# Patient Record
Sex: FEMALE | Race: WHITE | Employment: OTHER
[De-identification: names, ages, dates, MRNs, and addresses within clinical notes are randomized per-mention and may not be internally consistent; named-entity substitution may affect disease eponyms.]

---

## 2017-01-05 ENCOUNTER — TELEPHONE (OUTPATIENT)
Dept: CASE MANAGEMENT | Age: 62
End: 2017-01-05

## 2017-02-03 PROBLEM — E87.5 HYPERKALEMIA: Status: ACTIVE | Noted: 2017-02-03

## 2017-04-05 PROBLEM — N18.9 CKD (CHRONIC KIDNEY DISEASE): Status: RESOLVED | Noted: 2017-04-05 | Resolved: 2017-04-05

## 2017-04-05 PROBLEM — N18.9 CKD (CHRONIC KIDNEY DISEASE): Status: ACTIVE | Noted: 2017-04-05

## 2017-04-05 PROBLEM — E87.5 HYPERKALEMIA: Status: RESOLVED | Noted: 2017-02-03 | Resolved: 2017-04-05

## 2017-04-06 PROBLEM — N18.5 CKD (CHRONIC KIDNEY DISEASE), STAGE V (HCC): Status: ACTIVE | Noted: 2017-04-05

## 2017-04-06 PROBLEM — E87.70 VOLUME OVERLOAD: Status: ACTIVE | Noted: 2017-04-06

## 2017-04-06 PROBLEM — I10 ESSENTIAL HYPERTENSION: Status: ACTIVE | Noted: 2017-04-06

## 2017-04-07 PROBLEM — D84.9 IMMUNOSUPPRESSED STATUS (HCC): Status: ACTIVE | Noted: 2017-04-07

## 2017-04-07 PROBLEM — J96.21 ACUTE ON CHRONIC RESPIRATORY FAILURE WITH HYPOXIA (HCC): Status: ACTIVE | Noted: 2017-04-07

## 2017-05-17 PROBLEM — G40.309 GENERALIZED SEIZURE DISORDER (HCC): Status: ACTIVE | Noted: 2017-05-17

## 2017-11-13 PROBLEM — N18.5 CKD (CHRONIC KIDNEY DISEASE), STAGE V (HCC): Status: RESOLVED | Noted: 2017-04-05 | Resolved: 2017-11-13

## 2018-04-20 ENCOUNTER — APPOINTMENT (OUTPATIENT)
Dept: GENERAL RADIOLOGY | Age: 63
DRG: 193 | End: 2018-04-20
Payer: MEDICARE

## 2018-04-20 ENCOUNTER — APPOINTMENT (OUTPATIENT)
Dept: CT IMAGING | Age: 63
DRG: 193 | End: 2018-04-20
Payer: MEDICARE

## 2018-04-20 ENCOUNTER — HOSPITAL ENCOUNTER (INPATIENT)
Age: 63
LOS: 2 days | Discharge: HOME | DRG: 193 | End: 2018-04-22
Attending: EMERGENCY MEDICINE | Admitting: INTERNAL MEDICINE
Payer: MEDICARE

## 2018-04-20 DIAGNOSIS — R05.9 COUGH: ICD-10-CM

## 2018-04-20 DIAGNOSIS — K52.9 GASTROENTERITIS: ICD-10-CM

## 2018-04-20 DIAGNOSIS — J18.9 PNEUMONIA DUE TO ORGANISM: Primary | ICD-10-CM
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
## 2018-04-20 LAB
ANION GAP SERPL CALCULATED.3IONS-SCNC: 16 MMOL/L (ref 7–16)
ANISOCYTOSIS: ABNORMAL
BASOPHILS ABSOLUTE: 0 E9/L (ref 0–0.2)
BASOPHILS RELATIVE PERCENT: 0 % (ref 0–2)
BUN BLDV-MCNC: 43 MG/DL (ref 8–23)
CALCIUM SERPL-MCNC: 9.4 MG/DL (ref 8.6–10.2)
CHLORIDE BLD-SCNC: 92 MMOL/L (ref 98–107)
CO2: 29 MMOL/L (ref 22–29)
CREAT SERPL-MCNC: 4.9 MG/DL (ref 0.5–1)
EOSINOPHILS ABSOLUTE: 0 E9/L (ref 0.05–0.5)
EOSINOPHILS RELATIVE PERCENT: 0 % (ref 0–6)
GFR AFRICAN AMERICAN: 11
GFR NON-AFRICAN AMERICAN: 9 ML/MIN/1.73
GLUCOSE BLD-MCNC: 121 MG/DL (ref 74–109)
HCT VFR BLD CALC: 31.1 % (ref 34–48)
HEMOGLOBIN: 10.4 G/DL (ref 11.5–15.5)
INFLUENZA A BY PCR: NOT DETECTED
INFLUENZA B BY PCR: NOT DETECTED
LACTIC ACID: 1.1 MMOL/L (ref 0.5–2.2)
LYMPHOCYTES ABSOLUTE: 1.38 E9/L (ref 1.5–4)
LYMPHOCYTES RELATIVE PERCENT: 15.6 % (ref 20–42)
MCH RBC QN AUTO: 37.1 PG (ref 26–35)
MCHC RBC AUTO-ENTMCNC: 33.4 % (ref 32–34.5)
MCV RBC AUTO: 111.1 FL (ref 80–99.9)
MONOCYTES ABSOLUTE: 0.95 E9/L (ref 0.1–0.95)
MONOCYTES RELATIVE PERCENT: 11.3 % (ref 2–12)
NEUTROPHILS ABSOLUTE: 6.19 E9/L (ref 1.8–7.3)
NEUTROPHILS RELATIVE PERCENT: 72.2 % (ref 43–80)
NUCLEATED RED BLOOD CELLS: 0 /100 WBC
OVALOCYTES: ABNORMAL
PDW BLD-RTO: 14 FL (ref 11.5–15)
PLATELET # BLD: 180 E9/L (ref 130–450)
PMV BLD AUTO: 10.6 FL (ref 7–12)
POIKILOCYTES: ABNORMAL
POTASSIUM REFLEX MAGNESIUM: 5 MMOL/L (ref 3.5–5)
PROMYELOCYTES PERCENT: 0.9 % (ref 0–0)
RBC # BLD: 2.8 E12/L (ref 3.5–5.5)
SODIUM BLD-SCNC: 137 MMOL/L (ref 132–146)
WBC # BLD: 8.6 E9/L (ref 4.5–11.5)

## 2018-04-20 PROCEDURE — 6370000000 HC RX 637 (ALT 250 FOR IP): Performed by: INTERNAL MEDICINE

## 2018-04-20 PROCEDURE — 2580000003 HC RX 258: Performed by: FAMILY MEDICINE

## 2018-04-20 PROCEDURE — 83605 ASSAY OF LACTIC ACID: CPT

## 2018-04-20 PROCEDURE — 71046 X-RAY EXAM CHEST 2 VIEWS: CPT

## 2018-04-20 PROCEDURE — 87503 INFLUENZA DNA AMP PROB ADDL: CPT

## 2018-04-20 PROCEDURE — 87798 DETECT AGENT NOS DNA AMP: CPT

## 2018-04-20 PROCEDURE — 87501 INFLUENZA DNA AMP PROB 1+: CPT

## 2018-04-20 PROCEDURE — 87486 CHLMYD PNEUM DNA AMP PROBE: CPT

## 2018-04-20 PROCEDURE — 87581 M.PNEUMON DNA AMP PROBE: CPT

## 2018-04-20 PROCEDURE — 6360000002 HC RX W HCPCS: Performed by: FAMILY MEDICINE

## 2018-04-20 PROCEDURE — 80048 BASIC METABOLIC PNL TOTAL CA: CPT

## 2018-04-20 PROCEDURE — 71250 CT THORAX DX C-: CPT

## 2018-04-20 PROCEDURE — 87040 BLOOD CULTURE FOR BACTERIA: CPT

## 2018-04-20 PROCEDURE — 2580000003 HC RX 258: Performed by: INTERNAL MEDICINE

## 2018-04-20 PROCEDURE — 1200000000 HC SEMI PRIVATE

## 2018-04-20 PROCEDURE — 99285 EMERGENCY DEPT VISIT HI MDM: CPT

## 2018-04-20 PROCEDURE — 85025 COMPLETE CBC W/AUTO DIFF WBC: CPT

## 2018-04-20 PROCEDURE — 6360000002 HC RX W HCPCS: Performed by: INTERNAL MEDICINE

## 2018-04-20 PROCEDURE — 87502 INFLUENZA DNA AMP PROBE: CPT

## 2018-04-20 RX ORDER — SODIUM CHLORIDE 0.9 % (FLUSH) 0.9 %
10 SYRINGE (ML) INJECTION PRN
Status: DISCONTINUED | OUTPATIENT
Start: 2018-04-20 | End: 2018-04-20 | Stop reason: SDUPTHER

## 2018-04-20 RX ORDER — PREDNISONE 1 MG/1
5 TABLET ORAL EVERY MORNING
Status: DISCONTINUED | OUTPATIENT
Start: 2018-04-21 | End: 2018-04-22 | Stop reason: HOSPADM

## 2018-04-20 RX ORDER — PRIMIDONE 50 MG/1
150 TABLET ORAL 2 TIMES DAILY
Status: DISCONTINUED | OUTPATIENT
Start: 2018-04-20 | End: 2018-04-22 | Stop reason: HOSPADM

## 2018-04-20 RX ORDER — LEVETIRACETAM 100 MG/ML
1500 SOLUTION ORAL 2 TIMES DAILY
Status: DISCONTINUED | OUTPATIENT
Start: 2018-04-20 | End: 2018-04-22 | Stop reason: HOSPADM

## 2018-04-20 RX ORDER — SEVELAMER CARBONATE 800 MG/1
800 TABLET, FILM COATED ORAL
Status: DISCONTINUED | OUTPATIENT
Start: 2018-04-20 | End: 2018-04-22 | Stop reason: HOSPADM

## 2018-04-20 RX ORDER — HEPARIN SODIUM 10000 [USP'U]/ML
5000 INJECTION, SOLUTION INTRAVENOUS; SUBCUTANEOUS EVERY 8 HOURS
Status: DISCONTINUED | OUTPATIENT
Start: 2018-04-20 | End: 2018-04-22 | Stop reason: HOSPADM

## 2018-04-20 RX ORDER — VIT B COMP NO.3/FOLIC/C/BIOTIN 1 MG-60 MG
1 TABLET ORAL EVERY MORNING
Status: ON HOLD | COMMUNITY
End: 2018-04-21 | Stop reason: ALTCHOICE

## 2018-04-20 RX ORDER — CHOLECALCIFEROL (VITAMIN D3) 10 MCG
1 TABLET ORAL EVERY MORNING
Status: DISCONTINUED | OUTPATIENT
Start: 2018-04-21 | End: 2018-04-22 | Stop reason: HOSPADM

## 2018-04-20 RX ORDER — AMLODIPINE BESYLATE 10 MG/1
10 TABLET ORAL EVERY MORNING
Status: DISCONTINUED | OUTPATIENT
Start: 2018-04-21 | End: 2018-04-22 | Stop reason: HOSPADM

## 2018-04-20 RX ORDER — SODIUM BICARBONATE 650 MG/1
650 TABLET ORAL
Status: DISCONTINUED | OUTPATIENT
Start: 2018-04-20 | End: 2018-04-22 | Stop reason: HOSPADM

## 2018-04-20 RX ORDER — SODIUM CHLORIDE 0.9 % (FLUSH) 0.9 %
10 SYRINGE (ML) INJECTION PRN
Status: DISCONTINUED | OUTPATIENT
Start: 2018-04-20 | End: 2018-04-22 | Stop reason: HOSPADM

## 2018-04-20 RX ORDER — ACETAMINOPHEN 325 MG/1
650 TABLET ORAL EVERY 4 HOURS PRN
Status: DISCONTINUED | OUTPATIENT
Start: 2018-04-20 | End: 2018-04-20 | Stop reason: SDUPTHER

## 2018-04-20 RX ORDER — TACROLIMUS 1 MG/1
3 CAPSULE ORAL NIGHTLY
COMMUNITY
End: 2018-07-19 | Stop reason: ALTCHOICE

## 2018-04-20 RX ORDER — ONDANSETRON 2 MG/ML
4 INJECTION INTRAMUSCULAR; INTRAVENOUS EVERY 6 HOURS PRN
Status: DISCONTINUED | OUTPATIENT
Start: 2018-04-20 | End: 2018-04-22 | Stop reason: HOSPADM

## 2018-04-20 RX ORDER — ACETAMINOPHEN 325 MG/1
650 TABLET ORAL EVERY 4 HOURS PRN
Status: DISCONTINUED | OUTPATIENT
Start: 2018-04-20 | End: 2018-04-22 | Stop reason: HOSPADM

## 2018-04-20 RX ORDER — LACOSAMIDE 100 MG/1
250 TABLET ORAL NIGHTLY
Status: DISCONTINUED | OUTPATIENT
Start: 2018-04-20 | End: 2018-04-22 | Stop reason: HOSPADM

## 2018-04-20 RX ORDER — TACROLIMUS 1 MG/1
4 CAPSULE ORAL 2 TIMES DAILY
Status: DISCONTINUED | OUTPATIENT
Start: 2018-04-20 | End: 2018-04-21

## 2018-04-20 RX ORDER — SODIUM CHLORIDE 9 MG/ML
INJECTION, SOLUTION INTRAVENOUS CONTINUOUS
Status: DISCONTINUED | OUTPATIENT
Start: 2018-04-20 | End: 2018-04-21

## 2018-04-20 RX ORDER — LEVOFLOXACIN 5 MG/ML
750 INJECTION, SOLUTION INTRAVENOUS ONCE
Status: COMPLETED | OUTPATIENT
Start: 2018-04-20 | End: 2018-04-20

## 2018-04-20 RX ORDER — SODIUM CHLORIDE 0.9 % (FLUSH) 0.9 %
10 SYRINGE (ML) INJECTION EVERY 12 HOURS SCHEDULED
Status: DISCONTINUED | OUTPATIENT
Start: 2018-04-20 | End: 2018-04-20 | Stop reason: SDUPTHER

## 2018-04-20 RX ORDER — SODIUM CHLORIDE 0.9 % (FLUSH) 0.9 %
10 SYRINGE (ML) INJECTION EVERY 12 HOURS SCHEDULED
Status: DISCONTINUED | OUTPATIENT
Start: 2018-04-20 | End: 2018-04-22 | Stop reason: HOSPADM

## 2018-04-20 RX ORDER — CALCITRIOL 0.25 UG/1
0.5 CAPSULE, LIQUID FILLED ORAL EVERY MORNING
Status: DISCONTINUED | OUTPATIENT
Start: 2018-04-21 | End: 2018-04-22 | Stop reason: HOSPADM

## 2018-04-20 RX ADMIN — SODIUM CHLORIDE: 9 INJECTION, SOLUTION INTRAVENOUS at 20:56

## 2018-04-20 RX ADMIN — LACOSAMIDE 250 MG: 100 TABLET, FILM COATED ORAL at 20:54

## 2018-04-20 RX ADMIN — TACROLIMUS 4 MG: 1 CAPSULE ORAL at 20:54

## 2018-04-20 RX ADMIN — LEVETIRACETAM 1500 MG: 500 SOLUTION ORAL at 20:54

## 2018-04-20 RX ADMIN — SODIUM BICARBONATE 650 MG: 650 TABLET ORAL at 20:54

## 2018-04-20 RX ADMIN — Medication 10 ML: at 20:55

## 2018-04-20 RX ADMIN — PRIMIDONE 150 MG: 50 TABLET ORAL at 20:54

## 2018-04-20 RX ADMIN — CEFEPIME 1 G: 1 INJECTION, POWDER, FOR SOLUTION INTRAMUSCULAR; INTRAVENOUS at 17:28

## 2018-04-20 RX ADMIN — LEVOFLOXACIN 750 MG: 5 INJECTION, SOLUTION INTRAVENOUS at 21:09

## 2018-04-20 ASSESSMENT — ENCOUNTER SYMPTOMS
COUGH: 1
WHEEZING: 0
SHORTNESS OF BREATH: 0
NAUSEA: 1
SORE THROAT: 0

## 2018-04-20 ASSESSMENT — PAIN SCALES - GENERAL
PAINLEVEL_OUTOF10: 0

## 2018-04-21 PROBLEM — D64.9 ANEMIA: Status: ACTIVE | Noted: 2018-04-21

## 2018-04-21 LAB
ALBUMIN SERPL-MCNC: 3.4 G/DL (ref 3.5–5.2)
ALP BLD-CCNC: 126 U/L (ref 35–104)
ALT SERPL-CCNC: 8 U/L (ref 0–32)
ANION GAP SERPL CALCULATED.3IONS-SCNC: 18 MMOL/L (ref 7–16)
ANISOCYTOSIS: ABNORMAL
AST SERPL-CCNC: 15 U/L (ref 0–31)
BASOPHILS ABSOLUTE: 0.02 E9/L (ref 0–0.2)
BASOPHILS RELATIVE PERCENT: 0.3 % (ref 0–2)
BILIRUB SERPL-MCNC: 0.4 MG/DL (ref 0–1.2)
BUN BLDV-MCNC: 48 MG/DL (ref 8–23)
CALCIUM SERPL-MCNC: 8.7 MG/DL (ref 8.6–10.2)
CHLORIDE BLD-SCNC: 89 MMOL/L (ref 98–107)
CO2: 26 MMOL/L (ref 22–29)
CREAT SERPL-MCNC: 5.4 MG/DL (ref 0.5–1)
EOSINOPHILS ABSOLUTE: 0.14 E9/L (ref 0.05–0.5)
EOSINOPHILS RELATIVE PERCENT: 2.2 % (ref 0–6)
FILM ARRAY ADENOVIRUS: ABNORMAL
FILM ARRAY BORDETELLA PERTUSSIS: ABNORMAL
FILM ARRAY CHLAMYDOPHILIA PNEUMONIAE: ABNORMAL
FILM ARRAY CORONAVIRUS 229E: ABNORMAL
FILM ARRAY CORONAVIRUS HKU1: ABNORMAL
FILM ARRAY CORONAVIRUS NL63: ABNORMAL
FILM ARRAY CORONAVIRUS OC43: ABNORMAL
FILM ARRAY INFLUENZA A VIRUS 09H1: ABNORMAL
FILM ARRAY INFLUENZA A VIRUS H1: ABNORMAL
FILM ARRAY INFLUENZA A VIRUS H3: ABNORMAL
FILM ARRAY INFLUENZA A VIRUS: ABNORMAL
FILM ARRAY INFLUENZA B: ABNORMAL
FILM ARRAY MYCOPLASMA PNEUMONIAE: ABNORMAL
FILM ARRAY PARAINFLUENZA VIRUS 1: ABNORMAL
FILM ARRAY PARAINFLUENZA VIRUS 2: ABNORMAL
FILM ARRAY PARAINFLUENZA VIRUS 3: ABNORMAL
FILM ARRAY PARAINFLUENZA VIRUS 4: ABNORMAL
FILM ARRAY RESPIRATORY SYNCITIAL VIRUS: ABNORMAL
FILM ARRAY RHINOVIRUS/ENTEROVIRUS: ABNORMAL
GFR AFRICAN AMERICAN: 10
GFR NON-AFRICAN AMERICAN: 8 ML/MIN/1.73
GLUCOSE BLD-MCNC: 87 MG/DL (ref 74–109)
HCT VFR BLD CALC: 28.2 % (ref 34–48)
HEMOGLOBIN: 9.1 G/DL (ref 11.5–15.5)
IMMATURE GRANULOCYTES #: 0.03 E9/L
IMMATURE GRANULOCYTES %: 0.5 % (ref 0–5)
L. PNEUMOPHILA SEROGP 1 UR AG: NORMAL
LYMPHOCYTES ABSOLUTE: 2.49 E9/L (ref 1.5–4)
LYMPHOCYTES RELATIVE PERCENT: 38.7 % (ref 20–42)
MAGNESIUM: 2.2 MG/DL (ref 1.6–2.6)
MCH RBC QN AUTO: 35.7 PG (ref 26–35)
MCHC RBC AUTO-ENTMCNC: 32.3 % (ref 32–34.5)
MCV RBC AUTO: 110.6 FL (ref 80–99.9)
MONOCYTES ABSOLUTE: 0.64 E9/L (ref 0.1–0.95)
MONOCYTES RELATIVE PERCENT: 9.9 % (ref 2–12)
NEUTROPHILS ABSOLUTE: 3.12 E9/L (ref 1.8–7.3)
NEUTROPHILS RELATIVE PERCENT: 48.4 % (ref 43–80)
ORGANISM: ABNORMAL
PDW BLD-RTO: 14.1 FL (ref 11.5–15)
PHOSPHORUS: 4.1 MG/DL (ref 2.5–4.5)
PLATELET # BLD: 171 E9/L (ref 130–450)
PMV BLD AUTO: 10.8 FL (ref 7–12)
POTASSIUM REFLEX MAGNESIUM: 4.3 MMOL/L (ref 3.5–5)
RBC # BLD: 2.55 E12/L (ref 3.5–5.5)
SODIUM BLD-SCNC: 133 MMOL/L (ref 132–146)
STREP PNEUMONIAE ANTIGEN, URINE: NORMAL
TOTAL PROTEIN: 6.9 G/DL (ref 6.4–8.3)
WBC # BLD: 6.4 E9/L (ref 4.5–11.5)

## 2018-04-21 PROCEDURE — 6360000002 HC RX W HCPCS: Performed by: FAMILY MEDICINE

## 2018-04-21 PROCEDURE — 1200000000 HC SEMI PRIVATE

## 2018-04-21 PROCEDURE — 87450 HC DIRECT STREP B ANTIGEN: CPT

## 2018-04-21 PROCEDURE — 84100 ASSAY OF PHOSPHORUS: CPT

## 2018-04-21 PROCEDURE — 5A1D70Z PERFORMANCE OF URINARY FILTRATION, INTERMITTENT, LESS THAN 6 HOURS PER DAY: ICD-10-PCS | Performed by: INTERNAL MEDICINE

## 2018-04-21 PROCEDURE — 6370000000 HC RX 637 (ALT 250 FOR IP): Performed by: INTERNAL MEDICINE

## 2018-04-21 PROCEDURE — 80053 COMPREHEN METABOLIC PANEL: CPT

## 2018-04-21 PROCEDURE — 2580000003 HC RX 258: Performed by: INTERNAL MEDICINE

## 2018-04-21 PROCEDURE — 90935 HEMODIALYSIS ONE EVALUATION: CPT

## 2018-04-21 PROCEDURE — 2580000003 HC RX 258: Performed by: FAMILY MEDICINE

## 2018-04-21 PROCEDURE — 6360000002 HC RX W HCPCS: Performed by: INTERNAL MEDICINE

## 2018-04-21 PROCEDURE — 85025 COMPLETE CBC W/AUTO DIFF WBC: CPT

## 2018-04-21 PROCEDURE — 83735 ASSAY OF MAGNESIUM: CPT

## 2018-04-21 PROCEDURE — 36415 COLL VENOUS BLD VENIPUNCTURE: CPT

## 2018-04-21 RX ORDER — MELATONIN 5 MG
5 TABLET,CHEWABLE ORAL NIGHTLY PRN
Status: DISCONTINUED | OUTPATIENT
Start: 2018-04-21 | End: 2018-04-22 | Stop reason: HOSPADM

## 2018-04-21 RX ORDER — SULFAMETHOXAZOLE AND TRIMETHOPRIM 800; 160 MG/1; MG/1
1 TABLET ORAL
COMMUNITY
End: 2019-10-02

## 2018-04-21 RX ORDER — LACOSAMIDE 100 MG/1
200 TABLET ORAL DAILY
Status: DISCONTINUED | OUTPATIENT
Start: 2018-04-22 | End: 2018-04-22 | Stop reason: HOSPADM

## 2018-04-21 RX ORDER — CARVEDILOL 6.25 MG/1
6.25 TABLET ORAL 2 TIMES DAILY WITH MEALS
Status: DISCONTINUED | OUTPATIENT
Start: 2018-04-21 | End: 2018-04-22 | Stop reason: HOSPADM

## 2018-04-21 RX ORDER — LIDOCAINE AND PRILOCAINE 25; 25 MG/G; MG/G
CREAM TOPICAL PRN
Status: DISCONTINUED | OUTPATIENT
Start: 2018-04-21 | End: 2018-04-22 | Stop reason: HOSPADM

## 2018-04-21 RX ORDER — TACROLIMUS 1 MG/1
3 CAPSULE ORAL NIGHTLY
Status: DISCONTINUED | OUTPATIENT
Start: 2018-04-21 | End: 2018-04-22 | Stop reason: HOSPADM

## 2018-04-21 RX ORDER — LANOLIN ALCOHOL/MO/W.PET/CERES
5 CREAM (GRAM) TOPICAL NIGHTLY PRN
COMMUNITY
End: 2019-10-02

## 2018-04-21 RX ORDER — TACROLIMUS 1 MG/1
4 CAPSULE ORAL 2 TIMES DAILY WITH MEALS
Status: DISCONTINUED | OUTPATIENT
Start: 2018-04-22 | End: 2018-04-22 | Stop reason: HOSPADM

## 2018-04-21 RX ADMIN — SEVELAMER CARBONATE 800 MG: 800 TABLET, FILM COATED ORAL at 16:52

## 2018-04-21 RX ADMIN — PREDNISONE 5 MG: 5 TABLET ORAL at 08:12

## 2018-04-21 RX ADMIN — TACROLIMUS 4 MG: 1 CAPSULE ORAL at 08:11

## 2018-04-21 RX ADMIN — NEPHROCAP 1 MG: 1 CAP ORAL at 08:12

## 2018-04-21 RX ADMIN — CARVEDILOL 6.25 MG: 6.25 TABLET, FILM COATED ORAL at 18:23

## 2018-04-21 RX ADMIN — TACROLIMUS 4 MG: 1 CAPSULE ORAL at 13:53

## 2018-04-21 RX ADMIN — LEVETIRACETAM 1500 MG: 500 SOLUTION ORAL at 19:29

## 2018-04-21 RX ADMIN — ACETAMINOPHEN 650 MG: 325 TABLET ORAL at 04:04

## 2018-04-21 RX ADMIN — SODIUM BICARBONATE 650 MG: 650 TABLET ORAL at 12:46

## 2018-04-21 RX ADMIN — HEPARIN SODIUM 5000 UNITS: 10000 INJECTION, SOLUTION INTRAVENOUS; SUBCUTANEOUS at 18:24

## 2018-04-21 RX ADMIN — SODIUM BICARBONATE 650 MG: 650 TABLET ORAL at 07:09

## 2018-04-21 RX ADMIN — TACROLIMUS 3 MG: 1 CAPSULE ORAL at 20:14

## 2018-04-21 RX ADMIN — HEPARIN SODIUM 5000 UNITS: 10000 INJECTION, SOLUTION INTRAVENOUS; SUBCUTANEOUS at 03:17

## 2018-04-21 RX ADMIN — VANCOMYCIN HYDROCHLORIDE 1000 MG: 1 INJECTION, POWDER, LYOPHILIZED, FOR SOLUTION INTRAVENOUS at 01:00

## 2018-04-21 RX ADMIN — SODIUM CHLORIDE: 9 INJECTION, SOLUTION INTRAVENOUS at 13:55

## 2018-04-21 RX ADMIN — LACOSAMIDE 250 MG: 100 TABLET, FILM COATED ORAL at 20:15

## 2018-04-21 RX ADMIN — LIDOCAINE AND PRILOCAINE: 25; 25 CREAM TOPICAL at 08:07

## 2018-04-21 RX ADMIN — PRIMIDONE 150 MG: 50 TABLET ORAL at 20:14

## 2018-04-21 RX ADMIN — PRIMIDONE 150 MG: 50 TABLET ORAL at 08:12

## 2018-04-21 RX ADMIN — AMLODIPINE BESYLATE 10 MG: 10 TABLET ORAL at 08:11

## 2018-04-21 RX ADMIN — LEVETIRACETAM 1500 MG: 500 SOLUTION ORAL at 08:11

## 2018-04-21 RX ADMIN — Medication 10 ML: at 20:51

## 2018-04-21 RX ADMIN — SODIUM BICARBONATE 650 MG: 650 TABLET ORAL at 16:52

## 2018-04-21 ASSESSMENT — PAIN SCALES - GENERAL
PAINLEVEL_OUTOF10: 0
PAINLEVEL_OUTOF10: 5
PAINLEVEL_OUTOF10: 0

## 2018-04-22 VITALS
HEIGHT: 63 IN | WEIGHT: 112.21 LBS | DIASTOLIC BLOOD PRESSURE: 79 MMHG | OXYGEN SATURATION: 95 % | RESPIRATION RATE: 16 BRPM | BODY MASS INDEX: 19.88 KG/M2 | HEART RATE: 76 BPM | TEMPERATURE: 98.2 F | SYSTOLIC BLOOD PRESSURE: 130 MMHG

## 2018-04-22 LAB
ANION GAP SERPL CALCULATED.3IONS-SCNC: 14 MMOL/L (ref 7–16)
BUN BLDV-MCNC: 38 MG/DL (ref 8–23)
CALCIUM SERPL-MCNC: 9 MG/DL (ref 8.6–10.2)
CHLORIDE BLD-SCNC: 95 MMOL/L (ref 98–107)
CO2: 28 MMOL/L (ref 22–29)
CREAT SERPL-MCNC: 4.7 MG/DL (ref 0.5–1)
GFR AFRICAN AMERICAN: 11
GFR NON-AFRICAN AMERICAN: 9 ML/MIN/1.73
GLUCOSE BLD-MCNC: 82 MG/DL (ref 74–109)
HCT VFR BLD CALC: 27.2 % (ref 34–48)
HEMOGLOBIN: 8.9 G/DL (ref 11.5–15.5)
MAGNESIUM: 2.1 MG/DL (ref 1.6–2.6)
MCH RBC QN AUTO: 35.9 PG (ref 26–35)
MCHC RBC AUTO-ENTMCNC: 32.7 % (ref 32–34.5)
MCV RBC AUTO: 109.7 FL (ref 80–99.9)
PDW BLD-RTO: 13.7 FL (ref 11.5–15)
PHOSPHORUS: 4.2 MG/DL (ref 2.5–4.5)
PLATELET # BLD: 175 E9/L (ref 130–450)
PMV BLD AUTO: 10.6 FL (ref 7–12)
POTASSIUM SERPL-SCNC: 4.9 MMOL/L (ref 3.5–5)
RBC # BLD: 2.48 E12/L (ref 3.5–5.5)
SODIUM BLD-SCNC: 137 MMOL/L (ref 132–146)
WBC # BLD: 5.5 E9/L (ref 4.5–11.5)

## 2018-04-22 PROCEDURE — 85027 COMPLETE CBC AUTOMATED: CPT

## 2018-04-22 PROCEDURE — 84100 ASSAY OF PHOSPHORUS: CPT

## 2018-04-22 PROCEDURE — 6360000002 HC RX W HCPCS: Performed by: INTERNAL MEDICINE

## 2018-04-22 PROCEDURE — 36415 COLL VENOUS BLD VENIPUNCTURE: CPT

## 2018-04-22 PROCEDURE — 80048 BASIC METABOLIC PNL TOTAL CA: CPT

## 2018-04-22 PROCEDURE — 6370000000 HC RX 637 (ALT 250 FOR IP): Performed by: INTERNAL MEDICINE

## 2018-04-22 PROCEDURE — 2580000003 HC RX 258: Performed by: INTERNAL MEDICINE

## 2018-04-22 PROCEDURE — 83735 ASSAY OF MAGNESIUM: CPT

## 2018-04-22 RX ADMIN — PREDNISONE 5 MG: 5 TABLET ORAL at 08:56

## 2018-04-22 RX ADMIN — CARVEDILOL 6.25 MG: 6.25 TABLET, FILM COATED ORAL at 07:50

## 2018-04-22 RX ADMIN — TACROLIMUS 4 MG: 1 CAPSULE ORAL at 07:50

## 2018-04-22 RX ADMIN — NEPHROCAP 1 MG: 1 CAP ORAL at 08:56

## 2018-04-22 RX ADMIN — SODIUM BICARBONATE 650 MG: 650 TABLET ORAL at 11:15

## 2018-04-22 RX ADMIN — SODIUM BICARBONATE 650 MG: 650 TABLET ORAL at 06:26

## 2018-04-22 RX ADMIN — CALCITRIOL 0.5 MCG: 0.25 CAPSULE, LIQUID FILLED ORAL at 08:56

## 2018-04-22 RX ADMIN — LACOSAMIDE 200 MG: 100 TABLET, FILM COATED ORAL at 08:56

## 2018-04-22 RX ADMIN — SEVELAMER CARBONATE 800 MG: 800 TABLET, FILM COATED ORAL at 07:51

## 2018-04-22 RX ADMIN — AMLODIPINE BESYLATE 10 MG: 10 TABLET ORAL at 08:56

## 2018-04-22 RX ADMIN — HEPARIN SODIUM 5000 UNITS: 10000 INJECTION, SOLUTION INTRAVENOUS; SUBCUTANEOUS at 06:26

## 2018-04-22 RX ADMIN — SEVELAMER CARBONATE 800 MG: 800 TABLET, FILM COATED ORAL at 12:06

## 2018-04-22 RX ADMIN — LEVETIRACETAM 1500 MG: 500 SOLUTION ORAL at 08:56

## 2018-04-22 RX ADMIN — Medication 10 ML: at 08:57

## 2018-04-22 RX ADMIN — PRIMIDONE 150 MG: 50 TABLET ORAL at 08:57

## 2018-04-22 ASSESSMENT — PAIN SCALES - GENERAL: PAINLEVEL_OUTOF10: 0

## 2018-04-25 LAB
BLOOD CULTURE, ROUTINE: NORMAL
CULTURE, BLOOD 2: NORMAL

## 2018-05-02 LAB
EKG ATRIAL RATE: 78 BPM
EKG P AXIS: 43 DEGREES
EKG P-R INTERVAL: 172 MS
EKG Q-T INTERVAL: 410 MS
EKG QRS DURATION: 82 MS
EKG QTC CALCULATION (BAZETT): 467 MS
EKG R AXIS: 29 DEGREES
EKG T AXIS: 42 DEGREES
EKG VENTRICULAR RATE: 78 BPM

## 2018-06-05 ENCOUNTER — HOSPITAL ENCOUNTER (OUTPATIENT)
Age: 63
Discharge: HOME | End: 2018-06-07
Payer: MEDICARE

## 2018-06-05 PROCEDURE — 88175 CYTOPATH C/V AUTO FLUID REDO: CPT

## 2018-06-05 PROCEDURE — 87624 HPV HI-RISK TYP POOLED RSLT: CPT

## 2018-06-13 LAB
CORRESPONDING PAP CASE #: NORMAL
HPV, HIGH RISK: NEGATIVE

## 2018-07-19 ENCOUNTER — HOSPITAL ENCOUNTER (EMERGENCY)
Age: 63
Discharge: HOME | End: 2018-07-19
Attending: EMERGENCY MEDICINE
Payer: MEDICARE

## 2018-07-19 ENCOUNTER — APPOINTMENT (OUTPATIENT)
Dept: CT IMAGING | Age: 63
End: 2018-07-19
Payer: MEDICARE

## 2018-07-19 VITALS
WEIGHT: 108 LBS | RESPIRATION RATE: 16 BRPM | DIASTOLIC BLOOD PRESSURE: 74 MMHG | OXYGEN SATURATION: 98 % | TEMPERATURE: 98.1 F | HEIGHT: 60 IN | HEART RATE: 73 BPM | BODY MASS INDEX: 21.2 KG/M2 | SYSTOLIC BLOOD PRESSURE: 117 MMHG

## 2018-07-19 DIAGNOSIS — R56.9 SEIZURE (HCC): Primary | ICD-10-CM

## 2018-07-19 LAB
ALBUMIN SERPL-MCNC: 4.3 G/DL (ref 3.5–5.2)
ALP BLD-CCNC: 174 U/L (ref 35–104)
ALT SERPL-CCNC: 19 U/L (ref 0–32)
ANION GAP SERPL CALCULATED.3IONS-SCNC: 15 MMOL/L (ref 7–16)
AST SERPL-CCNC: 26 U/L (ref 0–31)
BASOPHILS ABSOLUTE: 0 E9/L (ref 0–0.2)
BASOPHILS RELATIVE PERCENT: 0 % (ref 0–2)
BILIRUB SERPL-MCNC: <0.2 MG/DL (ref 0–1.2)
BUN BLDV-MCNC: 36 MG/DL (ref 8–23)
CALCIUM SERPL-MCNC: 9.1 MG/DL (ref 8.6–10.2)
CHLORIDE BLD-SCNC: 95 MMOL/L (ref 98–107)
CO2: 30 MMOL/L (ref 22–29)
CREAT SERPL-MCNC: 4.3 MG/DL (ref 0.5–1)
EKG ATRIAL RATE: 64 BPM
EKG P AXIS: 68 DEGREES
EKG P-R INTERVAL: 170 MS
EKG Q-T INTERVAL: 448 MS
EKG QRS DURATION: 86 MS
EKG QTC CALCULATION (BAZETT): 462 MS
EKG R AXIS: 47 DEGREES
EKG T AXIS: 49 DEGREES
EKG VENTRICULAR RATE: 64 BPM
EOSINOPHILS ABSOLUTE: 0.07 E9/L (ref 0.05–0.5)
EOSINOPHILS RELATIVE PERCENT: 1.8 % (ref 0–6)
GFR AFRICAN AMERICAN: 13
GFR NON-AFRICAN AMERICAN: 10 ML/MIN/1.73
GLUCOSE BLD-MCNC: 130 MG/DL (ref 74–109)
HCT VFR BLD CALC: 32.5 % (ref 34–48)
HEMOGLOBIN: 10.9 G/DL (ref 11.5–15.5)
LYMPHOCYTES ABSOLUTE: 2.42 E9/L (ref 1.5–4)
LYMPHOCYTES RELATIVE PERCENT: 61.9 % (ref 20–42)
MCH RBC QN AUTO: 36.9 PG (ref 26–35)
MCHC RBC AUTO-ENTMCNC: 33.5 % (ref 32–34.5)
MCV RBC AUTO: 110.2 FL (ref 80–99.9)
MONOCYTES ABSOLUTE: 0.23 E9/L (ref 0.1–0.95)
MONOCYTES RELATIVE PERCENT: 6.2 % (ref 2–12)
NEUTROPHILS ABSOLUTE: 1.17 E9/L (ref 1.8–7.3)
NEUTROPHILS RELATIVE PERCENT: 30.1 % (ref 43–80)
NUCLEATED RED BLOOD CELLS: 0 /100 WBC
PDW BLD-RTO: 13.2 FL (ref 11.5–15)
PLATELET # BLD: 144 E9/L (ref 130–450)
PMV BLD AUTO: 11.1 FL (ref 7–12)
POTASSIUM SERPL-SCNC: 4.9 MMOL/L (ref 3.5–5)
RBC # BLD: 2.95 E12/L (ref 3.5–5.5)
SODIUM BLD-SCNC: 140 MMOL/L (ref 132–146)
TOTAL PROTEIN: 7.7 G/DL (ref 6.4–8.3)
WBC # BLD: 3.9 E9/L (ref 4.5–11.5)

## 2018-07-19 PROCEDURE — 6360000002 HC RX W HCPCS: Performed by: EMERGENCY MEDICINE

## 2018-07-19 PROCEDURE — 80053 COMPREHEN METABOLIC PANEL: CPT

## 2018-07-19 PROCEDURE — 96365 THER/PROPH/DIAG IV INF INIT: CPT

## 2018-07-19 PROCEDURE — 99285 EMERGENCY DEPT VISIT HI MDM: CPT

## 2018-07-19 PROCEDURE — 85025 COMPLETE CBC W/AUTO DIFF WBC: CPT

## 2018-07-19 PROCEDURE — 70450 CT HEAD/BRAIN W/O DYE: CPT

## 2018-07-19 PROCEDURE — 6370000000 HC RX 637 (ALT 250 FOR IP): Performed by: EMERGENCY MEDICINE

## 2018-07-19 RX ORDER — LEVETIRACETAM 10 MG/ML
1000 INJECTION INTRAVASCULAR ONCE
Status: COMPLETED | OUTPATIENT
Start: 2018-07-19 | End: 2018-07-19

## 2018-07-19 RX ORDER — VIT B COMP NO.3/FOLIC/C/BIOTIN 1 MG-60 MG
1 TABLET ORAL
COMMUNITY
End: 2019-10-02

## 2018-07-19 RX ORDER — PRIMIDONE 50 MG/1
150 TABLET ORAL EVERY 6 HOURS SCHEDULED
Status: DISCONTINUED | OUTPATIENT
Start: 2018-07-19 | End: 2018-07-19 | Stop reason: HOSPADM

## 2018-07-19 RX ORDER — LEVETIRACETAM 100 MG/ML
500 SOLUTION ORAL 2 TIMES DAILY
Status: DISCONTINUED | OUTPATIENT
Start: 2018-07-19 | End: 2018-07-19 | Stop reason: HOSPADM

## 2018-07-19 RX ORDER — LEVETIRACETAM 100 MG/ML
500 SOLUTION ORAL ONCE
Status: COMPLETED | OUTPATIENT
Start: 2018-07-19 | End: 2018-07-19

## 2018-07-19 RX ADMIN — LACOSAMIDE 250 MG: 100 TABLET, FILM COATED ORAL at 17:42

## 2018-07-19 RX ADMIN — LEVETIRACETAM 500 MG: 500 SOLUTION ORAL at 17:47

## 2018-07-19 RX ADMIN — LEVETIRACETAM 500 MG: 500 SOLUTION ORAL at 17:43

## 2018-07-19 RX ADMIN — LEVETIRACETAM 500 MG: 500 SOLUTION ORAL at 17:52

## 2018-07-19 RX ADMIN — PRIMIDONE 150 MG: 50 TABLET ORAL at 17:43

## 2018-07-19 RX ADMIN — LEVETIRACETAM 1000 MG: 10 INJECTION INTRAVENOUS at 16:12

## 2018-07-19 NOTE — ED PROVIDER NOTES
07/19/18   CBC Auto Differential   Result Value Ref Range    WBC 3.9 (L) 4.5 - 11.5 E9/L    RBC 2.95 (L) 3.50 - 5.50 E12/L    Hemoglobin 10.9 (L) 11.5 - 15.5 g/dL    Hematocrit 32.5 (L) 34.0 - 48.0 %    .2 (H) 80.0 - 99.9 fL    MCH 36.9 (H) 26.0 - 35.0 pg    MCHC 33.5 32.0 - 34.5 %    RDW 13.2 11.5 - 15.0 fL    Platelets 849 471 - 025 E9/L    MPV 11.1 7.0 - 12.0 fL    Neutrophils % 30.1 (L) 43.0 - 80.0 %    Lymphocytes % 61.9 (H) 20.0 - 42.0 %    Monocytes % 6.2 2.0 - 12.0 %    Eosinophils % 1.8 0.0 - 6.0 %    Basophils % 0.0 0.0 - 2.0 %    Neutrophils # 1.17 (L) 1.80 - 7.30 E9/L    Lymphocytes # 2.42 1.50 - 4.00 E9/L    Monocytes # 0.23 0.10 - 0.95 E9/L    Eosinophils # 0.07 0.05 - 0.50 E9/L    Basophils # 0.00 0.00 - 0.20 E9/L    nRBC 0.0 /100 WBC   Comprehensive Metabolic Panel   Result Value Ref Range    Sodium 140 132 - 146 mmol/L    Potassium 4.9 3.5 - 5.0 mmol/L    Chloride 95 (L) 98 - 107 mmol/L    CO2 30 (H) 22 - 29 mmol/L    Anion Gap 15 7 - 16 mmol/L    Glucose 130 (H) 74 - 109 mg/dL    BUN 36 (H) 8 - 23 mg/dL    CREATININE 4.3 (H) 0.5 - 1.0 mg/dL    GFR Non-African American 10 >=60 mL/min/1.73    GFR African American 13     Calcium 9.1 8.6 - 10.2 mg/dL    Total Protein 7.7 6.4 - 8.3 g/dL    Alb 4.3 3.5 - 5.2 g/dL    Total Bilirubin <0.2 0.0 - 1.2 mg/dL    Alkaline Phosphatase 174 (H) 35 - 104 U/L    ALT 19 0 - 32 U/L    AST 26 0 - 31 U/L   EKG 12 Lead   Result Value Ref Range    Ventricular Rate 64 BPM    Atrial Rate 64 BPM    P-R Interval 170 ms    QRS Duration 86 ms    Q-T Interval 448 ms    QTc Calculation (Bazett) 462 ms    P Axis 68 degrees    R Axis 47 degrees    T Axis 49 degrees       RADIOLOGY:  Interpreted by Radiologist unless otherwise specified  CT Head WO Contrast   Final Result   Stable and unremarkable calvarial and intracranial appearance with no   evidence of hemorrhage, mass lesions, or definite mass effect.       Subjectively, there is subtle prominence of the lateral disposition, IV medications, cardiac monitoring, continuous pulse oximetry and complex medical decision making and emergency management    This patient has remained hemodynamically stable during their ED course. Counseling: The emergency provider has spoken with the patient and discussed todays results, in addition to providing specific details for the plan of care and counseling regarding the diagnosis and prognosis. Questions are answered at this time and they are agreeable with the plan.       --------------------------------- IMPRESSION AND DISPOSITION ---------------------------------    IMPRESSION  1. Seizure ([address]        DISPOSITION  Disposition: Discharge to home  Patient condition is good        NOTE: This report was transcribed using voice recognition software.  Every effort was made to ensure accuracy; however, inadvertent computerized transcription errors may be present        Elton Patel MD  07/20/18 9680

## 2018-07-19 NOTE — ED NOTES
Bed: 18  Expected date: 7/19/18  Expected time: 2:30 PM  Means of arrival:   Comments:  DI Brennan RN  07/19/18 [id number]

## 2018-09-23 ENCOUNTER — PREP FOR PROCEDURE (OUTPATIENT)
Dept: VASCULAR SURGERY | Age: 63
End: 2018-09-23

## 2018-09-23 RX ORDER — SODIUM CHLORIDE 0.9 % (FLUSH) 0.9 %
10 SYRINGE (ML) INJECTION PRN
Status: CANCELLED | OUTPATIENT
Start: 2018-09-23 | End: 2019-09-23

## 2018-09-23 RX ORDER — SODIUM CHLORIDE 9 MG/ML
INJECTION, SOLUTION INTRAVENOUS CONTINUOUS
Status: CANCELLED | OUTPATIENT
Start: 2018-09-23 | End: 2019-09-23

## 2018-09-23 RX ORDER — SODIUM CHLORIDE 0.9 % (FLUSH) 0.9 %
10 SYRINGE (ML) INJECTION EVERY 12 HOURS SCHEDULED
Status: CANCELLED | OUTPATIENT
Start: 2018-09-23 | End: 2019-09-23

## 2018-09-26 RX ORDER — CHOLECALCIFEROL (VITAMIN D3) 1250 MCG
CAPSULE ORAL WEEKLY
COMMUNITY
End: 2019-10-02

## 2018-09-27 ENCOUNTER — ANESTHESIA EVENT (OUTPATIENT)
Dept: OPERATING ROOM | Age: 63
End: 2018-09-27
Payer: MEDICARE

## 2018-09-27 RX ORDER — SODIUM CHLORIDE 0.9 % (FLUSH) 0.9 %
10 SYRINGE (ML) INJECTION PRN
Status: CANCELLED | OUTPATIENT
Start: 2018-09-27 | End: 2019-09-27

## 2018-09-27 RX ORDER — SODIUM CHLORIDE 9 MG/ML
INJECTION, SOLUTION INTRAVENOUS CONTINUOUS
Status: CANCELLED | OUTPATIENT
Start: 2018-09-27 | End: 2019-09-27

## 2018-09-27 RX ORDER — SODIUM CHLORIDE 0.9 % (FLUSH) 0.9 %
10 SYRINGE (ML) INJECTION EVERY 12 HOURS SCHEDULED
Status: CANCELLED | OUTPATIENT
Start: 2018-09-27 | End: 2019-09-27

## 2018-09-28 ENCOUNTER — ANESTHESIA (OUTPATIENT)
Dept: OPERATING ROOM | Age: 63
End: 2018-09-28
Payer: MEDICARE

## 2018-09-28 ENCOUNTER — HOSPITAL ENCOUNTER (OUTPATIENT)
Age: 63
Setting detail: OUTPATIENT SURGERY
Discharge: HOME | End: 2018-09-28
Attending: SPECIALIST | Admitting: SPECIALIST
Payer: MEDICARE

## 2018-09-28 VITALS
WEIGHT: 114 LBS | BODY MASS INDEX: 22.38 KG/M2 | DIASTOLIC BLOOD PRESSURE: 62 MMHG | HEART RATE: 60 BPM | SYSTOLIC BLOOD PRESSURE: 115 MMHG | OXYGEN SATURATION: 95 % | RESPIRATION RATE: 17 BRPM | HEIGHT: 60 IN | TEMPERATURE: 97.5 F

## 2018-09-28 VITALS
DIASTOLIC BLOOD PRESSURE: 60 MMHG | SYSTOLIC BLOOD PRESSURE: 92 MMHG | RESPIRATION RATE: 8 BRPM | OXYGEN SATURATION: 100 %

## 2018-09-28 DIAGNOSIS — Z01.818 PRE-OP TESTING: Primary | ICD-10-CM

## 2018-09-28 DIAGNOSIS — I77.0 AVF (ARTERIOVENOUS FISTULA) (HCC): ICD-10-CM

## 2018-09-28 LAB
ANION GAP SERPL CALCULATED.3IONS-SCNC: 19 MMOL/L (ref 7–16)
ANISOCYTOSIS: ABNORMAL
APTT: 30.1 SEC (ref 24.5–35.1)
BASOPHILIC STIPPLING: ABNORMAL
BASOPHILS ABSOLUTE: 0 E9/L (ref 0–0.2)
BASOPHILS RELATIVE PERCENT: 0.8 % (ref 0–2)
BUN BLDV-MCNC: 41 MG/DL (ref 8–23)
CALCIUM SERPL-MCNC: 9.1 MG/DL (ref 8.6–10.2)
CHLORIDE BLD-SCNC: 99 MMOL/L (ref 98–107)
CO2: 26 MMOL/L (ref 22–29)
CREAT SERPL-MCNC: 5.3 MG/DL (ref 0.5–1)
EOSINOPHILS ABSOLUTE: 0.23 E9/L (ref 0.05–0.5)
EOSINOPHILS RELATIVE PERCENT: 4.5 % (ref 0–6)
GFR AFRICAN AMERICAN: 10
GFR NON-AFRICAN AMERICAN: 8 ML/MIN/1.73
GLUCOSE BLD-MCNC: 93 MG/DL (ref 74–109)
HCT VFR BLD CALC: 31.4 % (ref 34–48)
HEMOGLOBIN: 10.1 G/DL (ref 11.5–15.5)
INR BLD: 1
LYMPHOCYTES ABSOLUTE: 1.51 E9/L (ref 1.5–4)
LYMPHOCYTES RELATIVE PERCENT: 28.6 % (ref 20–42)
MCH RBC QN AUTO: 37.5 PG (ref 26–35)
MCHC RBC AUTO-ENTMCNC: 32.2 % (ref 32–34.5)
MCV RBC AUTO: 116.7 FL (ref 80–99.9)
MONOCYTES ABSOLUTE: 0.42 E9/L (ref 0.1–0.95)
MONOCYTES RELATIVE PERCENT: 8 % (ref 2–12)
NEUTROPHILS ABSOLUTE: 3.07 E9/L (ref 1.8–7.3)
NEUTROPHILS RELATIVE PERCENT: 58.9 % (ref 43–80)
NUCLEATED RED BLOOD CELLS: 0.9 /100 WBC
OVALOCYTES: ABNORMAL
PDW BLD-RTO: 14.6 FL (ref 11.5–15)
PLATELET # BLD: 177 E9/L (ref 130–450)
PMV BLD AUTO: 9.5 FL (ref 7–12)
POIKILOCYTES: ABNORMAL
POTASSIUM REFLEX MAGNESIUM: 5.1 MMOL/L (ref 3.5–5)
PROTHROMBIN TIME: 11.3 SEC (ref 9.3–12.4)
RBC # BLD: 2.69 E12/L (ref 3.5–5.5)
SODIUM BLD-SCNC: 144 MMOL/L (ref 132–146)
WBC # BLD: 5.2 E9/L (ref 4.5–11.5)

## 2018-09-28 PROCEDURE — 85730 THROMBOPLASTIN TIME PARTIAL: CPT

## 2018-09-28 PROCEDURE — 3600000012 HC SURGERY LEVEL 2 ADDTL 15MIN: Performed by: SPECIALIST

## 2018-09-28 PROCEDURE — 2580000003 HC RX 258: Performed by: NURSE ANESTHETIST, CERTIFIED REGISTERED

## 2018-09-28 PROCEDURE — 7100000011 HC PHASE II RECOVERY - ADDTL 15 MIN: Performed by: SPECIALIST

## 2018-09-28 PROCEDURE — 85025 COMPLETE CBC W/AUTO DIFF WBC: CPT

## 2018-09-28 PROCEDURE — 3600000002 HC SURGERY LEVEL 2 BASE: Performed by: SPECIALIST

## 2018-09-28 PROCEDURE — 6360000002 HC RX W HCPCS: Performed by: SPECIALIST

## 2018-09-28 PROCEDURE — 3700000001 HC ADD 15 MINUTES (ANESTHESIA): Performed by: SPECIALIST

## 2018-09-28 PROCEDURE — 85610 PROTHROMBIN TIME: CPT

## 2018-09-28 PROCEDURE — 80048 BASIC METABOLIC PNL TOTAL CA: CPT

## 2018-09-28 PROCEDURE — 3700000000 HC ANESTHESIA ATTENDED CARE: Performed by: SPECIALIST

## 2018-09-28 PROCEDURE — 6360000002 HC RX W HCPCS: Performed by: NURSE ANESTHETIST, CERTIFIED REGISTERED

## 2018-09-28 PROCEDURE — 2580000003 HC RX 258: Performed by: SPECIALIST

## 2018-09-28 PROCEDURE — 7100000010 HC PHASE II RECOVERY - FIRST 15 MIN: Performed by: SPECIALIST

## 2018-09-28 PROCEDURE — 2709999900 HC NON-CHARGEABLE SUPPLY: Performed by: SPECIALIST

## 2018-09-28 PROCEDURE — 2500000003 HC RX 250 WO HCPCS: Performed by: SPECIALIST

## 2018-09-28 RX ORDER — FENTANYL CITRATE 50 UG/ML
INJECTION, SOLUTION INTRAMUSCULAR; INTRAVENOUS PRN
Status: DISCONTINUED | OUTPATIENT
Start: 2018-09-28 | End: 2018-09-28 | Stop reason: SDUPTHER

## 2018-09-28 RX ORDER — SODIUM CHLORIDE 0.9 % (FLUSH) 0.9 %
10 SYRINGE (ML) INJECTION PRN
Status: DISCONTINUED | OUTPATIENT
Start: 2018-09-28 | End: 2018-09-28 | Stop reason: HOSPADM

## 2018-09-28 RX ORDER — SODIUM CHLORIDE 9 MG/ML
INJECTION, SOLUTION INTRAVENOUS CONTINUOUS
Status: DISCONTINUED | OUTPATIENT
Start: 2018-09-28 | End: 2018-09-28 | Stop reason: HOSPADM

## 2018-09-28 RX ORDER — MIDAZOLAM HYDROCHLORIDE 1 MG/ML
2 INJECTION INTRAMUSCULAR; INTRAVENOUS EVERY 10 MIN PRN
Status: DISCONTINUED | OUTPATIENT
Start: 2018-09-28 | End: 2018-09-28

## 2018-09-28 RX ORDER — HYDROCODONE BITARTRATE AND ACETAMINOPHEN 5; 325 MG/1; MG/1
1 TABLET ORAL EVERY 6 HOURS PRN
Qty: 28 TABLET | Refills: 0 | Status: SHIPPED | OUTPATIENT
Start: 2018-09-28 | End: 2018-09-28

## 2018-09-28 RX ORDER — FENTANYL CITRATE 50 UG/ML
50 INJECTION, SOLUTION INTRAMUSCULAR; INTRAVENOUS PRN
Status: DISCONTINUED | OUTPATIENT
Start: 2018-09-28 | End: 2018-09-28 | Stop reason: HOSPADM

## 2018-09-28 RX ORDER — SODIUM CHLORIDE, SODIUM LACTATE, POTASSIUM CHLORIDE, CALCIUM CHLORIDE 600; 310; 30; 20 MG/100ML; MG/100ML; MG/100ML; MG/100ML
INJECTION, SOLUTION INTRAVENOUS CONTINUOUS
Status: DISCONTINUED | OUTPATIENT
Start: 2018-09-28 | End: 2018-09-28 | Stop reason: HOSPADM

## 2018-09-28 RX ORDER — FENTANYL CITRATE 50 UG/ML
25 INJECTION, SOLUTION INTRAMUSCULAR; INTRAVENOUS ONCE
Status: DISCONTINUED | OUTPATIENT
Start: 2018-09-28 | End: 2018-09-28

## 2018-09-28 RX ORDER — 0.9 % SODIUM CHLORIDE 0.9 %
15 VIAL (ML) INJECTION ONCE
Status: DISCONTINUED | OUTPATIENT
Start: 2018-09-28 | End: 2018-09-28 | Stop reason: HOSPADM

## 2018-09-28 RX ORDER — SODIUM CHLORIDE 9 MG/ML
INJECTION, SOLUTION INTRAVENOUS CONTINUOUS PRN
Status: DISCONTINUED | OUTPATIENT
Start: 2018-09-28 | End: 2018-09-28 | Stop reason: SDUPTHER

## 2018-09-28 RX ORDER — HEPARIN SODIUM 1000 [USP'U]/ML
INJECTION, SOLUTION INTRAVENOUS; SUBCUTANEOUS PRN
Status: DISCONTINUED | OUTPATIENT
Start: 2018-09-28 | End: 2018-09-28 | Stop reason: SDUPTHER

## 2018-09-28 RX ORDER — HYDROCODONE BITARTRATE AND ACETAMINOPHEN 5; 325 MG/1; MG/1
1 TABLET ORAL EVERY 6 HOURS PRN
Qty: 28 TABLET | Refills: 0 | Status: SHIPPED | OUTPATIENT
Start: 2018-09-28 | End: 2018-10-05

## 2018-09-28 RX ORDER — ROPIVACAINE HYDROCHLORIDE 5 MG/ML
30 INJECTION, SOLUTION EPIDURAL; INFILTRATION; PERINEURAL ONCE
Status: DISCONTINUED | OUTPATIENT
Start: 2018-09-28 | End: 2018-09-28

## 2018-09-28 RX ORDER — PROPOFOL 10 MG/ML
INJECTION, EMULSION INTRAVENOUS CONTINUOUS PRN
Status: DISCONTINUED | OUTPATIENT
Start: 2018-09-28 | End: 2018-09-28 | Stop reason: SDUPTHER

## 2018-09-28 RX ORDER — SODIUM CHLORIDE 0.9 % (FLUSH) 0.9 %
10 SYRINGE (ML) INJECTION EVERY 12 HOURS SCHEDULED
Status: DISCONTINUED | OUTPATIENT
Start: 2018-09-28 | End: 2018-09-28 | Stop reason: HOSPADM

## 2018-09-28 RX ORDER — PROTAMINE SULFATE 10 MG/ML
INJECTION, SOLUTION INTRAVENOUS PRN
Status: DISCONTINUED | OUTPATIENT
Start: 2018-09-28 | End: 2018-09-28 | Stop reason: SDUPTHER

## 2018-09-28 RX ORDER — LIDOCAINE HYDROCHLORIDE AND EPINEPHRINE BITARTRATE 20; .01 MG/ML; MG/ML
INJECTION, SOLUTION SUBCUTANEOUS PRN
Status: DISCONTINUED | OUTPATIENT
Start: 2018-09-28 | End: 2018-09-28 | Stop reason: HOSPADM

## 2018-09-28 RX ADMIN — SODIUM CHLORIDE: 9 INJECTION, SOLUTION INTRAVENOUS at 07:26

## 2018-09-28 RX ADMIN — PHENYLEPHRINE HYDROCHLORIDE 100 MCG: 10 INJECTION INTRAVENOUS at 09:20

## 2018-09-28 RX ADMIN — PROPOFOL 100 MCG/KG/MIN: 10 INJECTION, EMULSION INTRAVENOUS at 07:30

## 2018-09-28 RX ADMIN — FENTANYL CITRATE 25 MCG: 50 INJECTION, SOLUTION INTRAMUSCULAR; INTRAVENOUS at 08:34

## 2018-09-28 RX ADMIN — FENTANYL CITRATE 25 MCG: 50 INJECTION, SOLUTION INTRAMUSCULAR; INTRAVENOUS at 09:43

## 2018-09-28 RX ADMIN — PROTAMINE SULFATE 25 MG: 10 INJECTION, SOLUTION INTRAVENOUS at 09:19

## 2018-09-28 RX ADMIN — FENTANYL CITRATE 50 MCG: 50 INJECTION, SOLUTION INTRAMUSCULAR; INTRAVENOUS at 07:33

## 2018-09-28 RX ADMIN — PHENYLEPHRINE HYDROCHLORIDE 50 MCG: 10 INJECTION INTRAVENOUS at 08:04

## 2018-09-28 RX ADMIN — PHENYLEPHRINE HYDROCHLORIDE 100 MCG: 10 INJECTION INTRAVENOUS at 08:22

## 2018-09-28 RX ADMIN — Medication 2 G: at 07:36

## 2018-09-28 RX ADMIN — HEPARIN SODIUM 3000 UNITS: 1000 INJECTION, SOLUTION INTRAVENOUS; SUBCUTANEOUS at 08:38

## 2018-09-28 ASSESSMENT — PULMONARY FUNCTION TESTS
PIF_VALUE: 1
PIF_VALUE: 0
PIF_VALUE: 1
PIF_VALUE: 0
PIF_VALUE: 1
PIF_VALUE: 0
PIF_VALUE: 1
PIF_VALUE: 0
PIF_VALUE: 1
PIF_VALUE: 0
PIF_VALUE: 1
PIF_VALUE: 0
PIF_VALUE: 1
PIF_VALUE: 0
PIF_VALUE: 1
PIF_VALUE: 0
PIF_VALUE: 1

## 2018-09-28 ASSESSMENT — PAIN SCALES - GENERAL
PAINLEVEL_OUTOF10: 0

## 2018-09-28 ASSESSMENT — PAIN - FUNCTIONAL ASSESSMENT: PAIN_FUNCTIONAL_ASSESSMENT: 0-10

## 2018-09-28 ASSESSMENT — ENCOUNTER SYMPTOMS: SHORTNESS OF BREATH: 0

## 2018-09-28 ASSESSMENT — LIFESTYLE VARIABLES: SMOKING_STATUS: 0

## 2018-09-28 NOTE — ANESTHESIA PRE PROCEDURE
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    Encounter regarding vascular access for dialysis for ESRD ([facility] N18.6, Z99.2    Volume overload E87.70    Essential hypertension I10    Shortness of breath R06.02    Immunosuppressed status ([address] D89.9    Acute on chronic respiratory failure with hypoxia (HCC) J96.21    Acute on chronic diastolic congestive heart failure (HCC) I50.33    Generalized seizure disorder ([facility] G40.309    Iron deficiency anemia D50.9    ESRD (end stage renal disease) on dialysis ([facility]) N18.6, Z99.2    AVF (arteriovenous fistula) ([facility]) I77.0    Hospital-acquired pneumonia J18.9    Pneumonia J18.9    Anemia D64.9       Past Medical History:        Diagnosis Date    Acute on chronic respiratory failure with hypoxia ([facility]) 4/7/2017    Anemia     Cardiomegaly     CHF (congestive heart failure) (HCC)     Chronic kidney disease     Constipation     Diaphragmatic hernia     Diverticulitis     Epilepsy (HCC)     Falls     GERD (gastroesophageal reflux disease)     Hemiparesis (HCC)     Hemiplegia (HCC)     Hemodialysis patient ([facility]     MON - WED- FRI    History of blood transfusion     History of lung transplant ([facility]     Hyperparathyroidism ([address]     Hypertension     Immunosuppressed status ([address] 4/7/2017    Insomnia     IPF (idiopathic pulmonary fibrosis) ([facility])     Kidney stone     Neutropenia (HCC)     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 LAN    FIXATION KYPHOPLASTY  11/3/2015    kyphoplasty L5    FRACTURE SURGERY Left 12/2014    fracture    HERNIA REPAIR      HIP SURGERY      left    JOINT REPLACEMENT Bilateral     HIP    JOINT REPLACEMENT Left     knee    KNEE SURGERY      left    LEG SURGERY Left 12/06/2016    ORIF left femoral shaft and suprachondylr femur    [facility]    OTHER SURGICAL HISTORY  11/14/2017    RUE fistula revision       Social History:    Social History   Substance Use Topics    Smoking status: Never Smoker    Smokeless tobacco: Never Used    Alcohol use No                                Counseling given: Not Answered      Vital Signs (Current):   Vitals:    09/26/18 0825 09/28/18 0607   BP:  137/74   Pulse:  71   Resp:  18   Temp:  98.9 °F (37.2 °C)   TempSrc:  Temporal   SpO2:  96%   Weight: 114 lb (51.7 kg) 114 lb (51.7 kg)   Height: 5' (1.524 m)                                               BP Readings from Last 3 Encounters:   09/28/18 137/74   07/19/18 117/74   06/05/18 125/74       NPO Status: Time of last liquid consumption: 2130                        Time of last solid consumption: 2130                        Date of last liquid consumption: 09/27/18                        Date of last solid food consumption: 09/27/18    BMI:   Wt Readings from Last 3 Encounters:   09/28/18 114 lb (51.7 kg)   07/19/18 108 lb (49 kg)   06/05/18 109 lb 3.2 oz (49.5 kg)     Body mass index is 22.26 kg/m².     CBC:   Lab Results   Component Value Date    WBC 5.2 09/28/2018    RBC 2.69 09/28/2018    HGB 10.1 09/28/2018

## 2018-09-28 NOTE — ANESTHESIA POSTPROCEDURE EVALUATION
Department of Anesthesiology  Postprocedure Note    Patient: Merissa Hartman  MRN: [id number]  YOB: 1955  Date of evaluation: 9/28/2018  Time:  12:29 PM     Procedure Summary     Date:  09/28/18 Room / Location:  [facility] 03 / [facility]    Anesthesia Start:  0726 Anesthesia Stop:  3919    Procedure:  AV FISTULA  REVISION -- RIGHT ARM (Right ) Diagnosis:  (CHRONIC RENAL FAILURE)    Surgeon:  Caroline Cueto MD Responsible Provider:  Harrison Padron MD    Anesthesia Type:  MAC ASA Status:  4          Anesthesia Type: MAC    Isabela Phase I: Isabela Score: 10    Isabela Phase II: Isabela Score: 10    Last vitals: Reviewed and per EMR flowsheets.        Anesthesia Post Evaluation    Patient location during evaluation: PACU  Patient participation: complete - patient participated  Level of consciousness: awake and alert  Airway patency: patent  Nausea & Vomiting: no nausea and no vomiting  Complications: no  Cardiovascular status: hemodynamically stable and blood pressure returned to baseline  Respiratory status: acceptable  Hydration status: euvolemic

## 2018-09-28 NOTE — PROGRESS NOTES
Pt admitted to same day surgery. Pt alert/oriented x3. Respirations non-labored. Denies chest pain/shortness of breath. Skin warm/dry. No open rash/wounds. No distress noted. Side rails x2. Call light in reach. Will continue to monitor.   Rosaura Olmos RN

## 2018-09-28 NOTE — OP NOTE
the procedure and was transferred to the recovery area in satisfactory condition.      Electronically signed by Von Frederick MD on 9/28/18 at 10:38 AM    CC : Otto Caba MD

## 2018-10-18 ENCOUNTER — HOSPITAL ENCOUNTER (OUTPATIENT)
Age: 63
Discharge: HOME | End: 2018-10-20

## 2018-10-18 PROCEDURE — 88305 TISSUE EXAM BY PATHOLOGIST: CPT

## 2018-12-18 ENCOUNTER — HOSPITAL ENCOUNTER (EMERGENCY)
Age: 63
Discharge: HOME | End: 2018-12-18
Attending: EMERGENCY MEDICINE
Payer: COMMERCIAL

## 2018-12-18 ENCOUNTER — APPOINTMENT (OUTPATIENT)
Dept: CT IMAGING | Age: 63
End: 2018-12-18
Payer: COMMERCIAL

## 2018-12-18 VITALS
HEART RATE: 92 BPM | BODY MASS INDEX: 22.38 KG/M2 | WEIGHT: 114 LBS | HEIGHT: 60 IN | RESPIRATION RATE: 18 BRPM | SYSTOLIC BLOOD PRESSURE: 142 MMHG | DIASTOLIC BLOOD PRESSURE: 78 MMHG | TEMPERATURE: 98.1 F | OXYGEN SATURATION: 97 %

## 2018-12-18 DIAGNOSIS — G40.919 BREAKTHROUGH SEIZURE (HCC): Primary | ICD-10-CM

## 2018-12-18 DIAGNOSIS — J01.90 ACUTE NON-RECURRENT SINUSITIS, UNSPECIFIED LOCATION: ICD-10-CM

## 2018-12-18 LAB
ALBUMIN SERPL-MCNC: 4.2 G/DL (ref 3.5–5.2)
ALP BLD-CCNC: 161 U/L (ref 35–104)
ALT SERPL-CCNC: 14 U/L (ref 0–32)
ANION GAP SERPL CALCULATED.3IONS-SCNC: 15 MMOL/L (ref 7–16)
AST SERPL-CCNC: 20 U/L (ref 0–31)
BASOPHILS ABSOLUTE: 0.01 E9/L (ref 0–0.2)
BASOPHILS RELATIVE PERCENT: 0.2 % (ref 0–2)
BILIRUB SERPL-MCNC: 0.2 MG/DL (ref 0–1.2)
BILIRUBIN DIRECT: <0.2 MG/DL (ref 0–0.3)
BILIRUBIN, INDIRECT: ABNORMAL MG/DL (ref 0–1)
BUN BLDV-MCNC: 20 MG/DL (ref 8–23)
CALCIUM SERPL-MCNC: 7.5 MG/DL (ref 8.6–10.2)
CHLORIDE BLD-SCNC: 99 MMOL/L (ref 98–107)
CO2: 29 MMOL/L (ref 22–29)
CREAT SERPL-MCNC: 3 MG/DL (ref 0.5–1)
EKG ATRIAL RATE: 93 BPM
EKG P AXIS: 64 DEGREES
EKG P-R INTERVAL: 212 MS
EKG Q-T INTERVAL: 400 MS
EKG QRS DURATION: 82 MS
EKG QTC CALCULATION (BAZETT): 497 MS
EKG R AXIS: 68 DEGREES
EKG T AXIS: 50 DEGREES
EKG VENTRICULAR RATE: 93 BPM
EOSINOPHILS ABSOLUTE: 0.11 E9/L (ref 0.05–0.5)
EOSINOPHILS RELATIVE PERCENT: 2.1 % (ref 0–6)
GFR AFRICAN AMERICAN: 19
GFR NON-AFRICAN AMERICAN: 16 ML/MIN/1.73
GLUCOSE BLD-MCNC: 105 MG/DL (ref 74–99)
HCT VFR BLD CALC: 28.4 % (ref 34–48)
HEMOGLOBIN: 9.4 G/DL (ref 11.5–15.5)
IMMATURE GRANULOCYTES #: 0.02 E9/L
IMMATURE GRANULOCYTES %: 0.4 % (ref 0–5)
LYMPHOCYTES ABSOLUTE: 1.45 E9/L (ref 1.5–4)
LYMPHOCYTES RELATIVE PERCENT: 27.4 % (ref 20–42)
MAGNESIUM: 2 MG/DL (ref 1.6–2.6)
MCH RBC QN AUTO: 35.9 PG (ref 26–35)
MCHC RBC AUTO-ENTMCNC: 33.1 % (ref 32–34.5)
MCV RBC AUTO: 108.4 FL (ref 80–99.9)
MONOCYTES ABSOLUTE: 0.45 E9/L (ref 0.1–0.95)
MONOCYTES RELATIVE PERCENT: 8.5 % (ref 2–12)
NEUTROPHILS ABSOLUTE: 3.26 E9/L (ref 1.8–7.3)
NEUTROPHILS RELATIVE PERCENT: 61.4 % (ref 43–80)
PDW BLD-RTO: 14 FL (ref 11.5–15)
PLATELET # BLD: 147 E9/L (ref 130–450)
PMV BLD AUTO: 10.2 FL (ref 7–12)
POTASSIUM SERPL-SCNC: 3.2 MMOL/L (ref 3.5–5)
RBC # BLD: 2.62 E12/L (ref 3.5–5.5)
SODIUM BLD-SCNC: 143 MMOL/L (ref 132–146)
TOTAL CK: 96 U/L (ref 20–180)
TOTAL PROTEIN: 6.8 G/DL (ref 6.4–8.3)
WBC # BLD: 5.3 E9/L (ref 4.5–11.5)

## 2018-12-18 PROCEDURE — 85025 COMPLETE CBC W/AUTO DIFF WBC: CPT

## 2018-12-18 PROCEDURE — 93005 ELECTROCARDIOGRAM TRACING: CPT | Performed by: EMERGENCY MEDICINE

## 2018-12-18 PROCEDURE — 70450 CT HEAD/BRAIN W/O DYE: CPT

## 2018-12-18 PROCEDURE — 6360000002 HC RX W HCPCS: Performed by: EMERGENCY MEDICINE

## 2018-12-18 PROCEDURE — 80076 HEPATIC FUNCTION PANEL: CPT

## 2018-12-18 PROCEDURE — 80048 BASIC METABOLIC PNL TOTAL CA: CPT

## 2018-12-18 PROCEDURE — 99284 EMERGENCY DEPT VISIT MOD MDM: CPT

## 2018-12-18 PROCEDURE — 82550 ASSAY OF CK (CPK): CPT

## 2018-12-18 PROCEDURE — 83735 ASSAY OF MAGNESIUM: CPT

## 2018-12-18 PROCEDURE — 96365 THER/PROPH/DIAG IV INF INIT: CPT

## 2018-12-18 RX ORDER — LEVETIRACETAM 5 MG/ML
500 INJECTION INTRAVASCULAR ONCE
Status: COMPLETED | OUTPATIENT
Start: 2018-12-18 | End: 2018-12-18

## 2018-12-18 RX ORDER — AMOXICILLIN 500 MG/1
500 CAPSULE ORAL DAILY
Qty: 7 CAPSULE | Refills: 0 | Status: SHIPPED | OUTPATIENT
Start: 2018-12-18 | End: 2018-12-25

## 2018-12-18 RX ADMIN — LEVETIRACETAM 500 MG: 5 INJECTION INTRAVENOUS at 00:50

## 2018-12-18 NOTE — ED PROVIDER NOTES
instructed her to make an appointment with him for him to reevaluate her in 2-3 days. Counseling: The emergency provider has spoken with the patient and  and discussed todays results, in addition to providing specific details for the plan of care and counseling regarding the diagnosis and prognosis. Questions are answered at this time and they are agreeable with the plan.      --------------------------------- IMPRESSION AND DISPOSITION ---------------------------------    IMPRESSION  1. Breakthrough seizure ([address]    2. Acute non-recurrent sinusitis, unspecified location        DISPOSITION  Disposition: Discharge to home  Patient condition is fair      NOTE: This report was transcribed using voice recognition software.  Every effort was made to ensure accuracy; however, inadvertent computerized transcription errors may be present       Val Pacheoc DO  12/18/18 0154

## 2018-12-21 LAB
EKG ATRIAL RATE: 93 BPM
EKG P AXIS: 62 DEGREES
EKG P-R INTERVAL: 204 MS
EKG Q-T INTERVAL: 414 MS
EKG QRS DURATION: 84 MS
EKG QTC CALCULATION (BAZETT): 514 MS
EKG R AXIS: 68 DEGREES
EKG T AXIS: 45 DEGREES
EKG VENTRICULAR RATE: 93 BPM

## 2019-09-14 ENCOUNTER — HOSPITAL ENCOUNTER (EMERGENCY)
Age: 64
Discharge: HOME | End: 2019-09-14
Attending: EMERGENCY MEDICINE
Payer: COMMERCIAL

## 2019-09-14 ENCOUNTER — APPOINTMENT (OUTPATIENT)
Dept: GENERAL RADIOLOGY | Age: 64
End: 2019-09-14
Payer: COMMERCIAL

## 2019-09-14 VITALS
DIASTOLIC BLOOD PRESSURE: 61 MMHG | SYSTOLIC BLOOD PRESSURE: 97 MMHG | WEIGHT: 111 LBS | RESPIRATION RATE: 16 BRPM | HEART RATE: 85 BPM | OXYGEN SATURATION: 97 % | TEMPERATURE: 98.7 F | BODY MASS INDEX: 21.79 KG/M2 | HEIGHT: 60 IN

## 2019-09-14 DIAGNOSIS — J06.9 VIRAL UPPER RESPIRATORY TRACT INFECTION: Primary | ICD-10-CM

## 2019-09-14 LAB
ANION GAP SERPL CALCULATED.3IONS-SCNC: 16 MMOL/L (ref 7–16)
ANISOCYTOSIS: ABNORMAL
BACTERIA: ABNORMAL /HPF
BASOPHILS ABSOLUTE: 0.04 E9/L (ref 0–0.2)
BASOPHILS RELATIVE PERCENT: 0.8 % (ref 0–2)
BILIRUBIN URINE: NEGATIVE
BLOOD, URINE: ABNORMAL
BUN BLDV-MCNC: 31 MG/DL (ref 8–23)
CALCIUM SERPL-MCNC: 9.4 MG/DL (ref 8.6–10.2)
CHLORIDE BLD-SCNC: 92 MMOL/L (ref 98–107)
CLARITY: CLEAR
CO2: 28 MMOL/L (ref 22–29)
COLOR: YELLOW
CREAT SERPL-MCNC: 4 MG/DL (ref 0.5–1)
EOSINOPHILS ABSOLUTE: 0.16 E9/L (ref 0.05–0.5)
EOSINOPHILS RELATIVE PERCENT: 3.4 % (ref 0–6)
EPITHELIAL CELLS, UA: ABNORMAL /HPF
FILM ARRAY ADENOVIRUS: ABNORMAL
FILM ARRAY BORDETELLA PERTUSSIS: ABNORMAL
FILM ARRAY CHLAMYDOPHILIA PNEUMONIAE: ABNORMAL
FILM ARRAY CORONAVIRUS 229E: ABNORMAL
FILM ARRAY CORONAVIRUS HKU1: ABNORMAL
FILM ARRAY CORONAVIRUS NL63: ABNORMAL
FILM ARRAY CORONAVIRUS OC43: ABNORMAL
FILM ARRAY INFLUENZA A VIRUS 09H1: ABNORMAL
FILM ARRAY INFLUENZA A VIRUS H1: ABNORMAL
FILM ARRAY INFLUENZA A VIRUS H3: ABNORMAL
FILM ARRAY INFLUENZA A VIRUS: ABNORMAL
FILM ARRAY INFLUENZA B: ABNORMAL
FILM ARRAY METAPNEUMOVIRUS: ABNORMAL
FILM ARRAY MYCOPLASMA PNEUMONIAE: ABNORMAL
FILM ARRAY PARAINFLUENZA VIRUS 1: ABNORMAL
FILM ARRAY PARAINFLUENZA VIRUS 2: ABNORMAL
FILM ARRAY PARAINFLUENZA VIRUS 3: ABNORMAL
FILM ARRAY PARAINFLUENZA VIRUS 4: ABNORMAL
FILM ARRAY RESPIRATORY SYNCITIAL VIRUS: ABNORMAL
GFR AFRICAN AMERICAN: 14
GFR NON-AFRICAN AMERICAN: 11 ML/MIN/1.73
GLUCOSE BLD-MCNC: 100 MG/DL (ref 74–99)
GLUCOSE URINE: NEGATIVE MG/DL
HCT VFR BLD CALC: 33 % (ref 34–48)
HEMOGLOBIN: 10.8 G/DL (ref 11.5–15.5)
IMMATURE GRANULOCYTES #: 0.02 E9/L
IMMATURE GRANULOCYTES %: 0.4 % (ref 0–5)
KETONES, URINE: ABNORMAL MG/DL
LEUKOCYTE ESTERASE, URINE: NEGATIVE
LYMPHOCYTES ABSOLUTE: 1.35 E9/L (ref 1.5–4)
LYMPHOCYTES RELATIVE PERCENT: 28.5 % (ref 20–42)
MCH RBC QN AUTO: 37.9 PG (ref 26–35)
MCHC RBC AUTO-ENTMCNC: 32.7 % (ref 32–34.5)
MCV RBC AUTO: 115.8 FL (ref 80–99.9)
MONOCYTES ABSOLUTE: 0.63 E9/L (ref 0.1–0.95)
MONOCYTES RELATIVE PERCENT: 13.3 % (ref 2–12)
NEUTROPHILS ABSOLUTE: 2.54 E9/L (ref 1.8–7.3)
NEUTROPHILS RELATIVE PERCENT: 53.6 % (ref 43–80)
NITRITE, URINE: NEGATIVE
ORGANISM: ABNORMAL
OVALOCYTES: ABNORMAL
PDW BLD-RTO: 14 FL (ref 11.5–15)
PH UA: 5 (ref 5–9)
PLATELET # BLD: 166 E9/L (ref 130–450)
PMV BLD AUTO: 10.9 FL (ref 7–12)
POIKILOCYTES: ABNORMAL
POTASSIUM SERPL-SCNC: 3.8 MMOL/L (ref 3.5–5)
PROTEIN UA: 100 MG/DL
RBC # BLD: 2.85 E12/L (ref 3.5–5.5)
RBC UA: ABNORMAL /HPF (ref 0–2)
REASON FOR REJECTION: NORMAL
REJECTED TEST: NORMAL
SODIUM BLD-SCNC: 136 MMOL/L (ref 132–146)
SPECIFIC GRAVITY UA: 1.02 (ref 1–1.03)
STREP GRP A PCR: NEGATIVE
UROBILINOGEN, URINE: 0.2 E.U./DL
WBC # BLD: 4.7 E9/L (ref 4.5–11.5)
WBC UA: ABNORMAL /HPF (ref 0–5)

## 2019-09-14 PROCEDURE — 80048 BASIC METABOLIC PNL TOTAL CA: CPT

## 2019-09-14 PROCEDURE — 81001 URINALYSIS AUTO W/SCOPE: CPT

## 2019-09-14 PROCEDURE — 87040 BLOOD CULTURE FOR BACTERIA: CPT

## 2019-09-14 PROCEDURE — 87581 M.PNEUMON DNA AMP PROBE: CPT

## 2019-09-14 PROCEDURE — 71046 X-RAY EXAM CHEST 2 VIEWS: CPT

## 2019-09-14 PROCEDURE — 85025 COMPLETE CBC W/AUTO DIFF WBC: CPT

## 2019-09-14 PROCEDURE — 87880 STREP A ASSAY W/OPTIC: CPT

## 2019-09-14 PROCEDURE — 99284 EMERGENCY DEPT VISIT MOD MDM: CPT

## 2019-09-14 PROCEDURE — 36415 COLL VENOUS BLD VENIPUNCTURE: CPT

## 2019-09-14 PROCEDURE — 87450 HC DIRECT STREP B ANTIGEN: CPT

## 2019-09-14 PROCEDURE — 87798 DETECT AGENT NOS DNA AMP: CPT

## 2019-09-14 PROCEDURE — 87633 RESP VIRUS 12-25 TARGETS: CPT

## 2019-09-14 PROCEDURE — 87486 CHLMYD PNEUM DNA AMP PROBE: CPT

## 2019-09-14 ASSESSMENT — PAIN DESCRIPTION - FREQUENCY: FREQUENCY: CONTINUOUS

## 2019-09-14 ASSESSMENT — PAIN SCALES - GENERAL: PAINLEVEL_OUTOF10: 5

## 2019-09-14 ASSESSMENT — PAIN DESCRIPTION - PAIN TYPE: TYPE: ACUTE PAIN

## 2019-09-14 ASSESSMENT — PAIN DESCRIPTION - LOCATION: LOCATION: HEAD;THROAT

## 2019-09-14 ASSESSMENT — PAIN DESCRIPTION - ORIENTATION: ORIENTATION: POSTERIOR

## 2019-09-14 ASSESSMENT — PAIN DESCRIPTION - PROGRESSION: CLINICAL_PROGRESSION: GRADUALLY WORSENING

## 2019-09-14 ASSESSMENT — PAIN DESCRIPTION - ONSET: ONSET: GRADUAL

## 2019-09-14 ASSESSMENT — PAIN DESCRIPTION - DESCRIPTORS: DESCRIPTORS: SORE;HEADACHE

## 2019-09-14 NOTE — ED NOTES
Patient aware of need for urine specimen, cup given to patient.       Aleena Naqvi RN  09/14/19 8718

## 2019-09-15 LAB
L. PNEUMOPHILA SEROGP 1 UR AG: NORMAL
STREP PNEUMONIAE ANTIGEN, URINE: NORMAL

## 2019-09-19 LAB
BLOOD CULTURE, ROUTINE: NORMAL
CULTURE, BLOOD 2: NORMAL

## 2019-10-02 ENCOUNTER — HOSPITAL ENCOUNTER (INPATIENT)
Age: 64
LOS: 6 days | Discharge: NURSING, REHAB OR LONG-TERM CARE | DRG: 535 | End: 2019-10-08
Attending: EMERGENCY MEDICINE | Admitting: INTERNAL MEDICINE
Payer: COMMERCIAL

## 2019-10-02 ENCOUNTER — APPOINTMENT (OUTPATIENT)
Dept: CT IMAGING | Age: 64
DRG: 535 | End: 2019-10-02
Payer: COMMERCIAL

## 2019-10-02 ENCOUNTER — APPOINTMENT (OUTPATIENT)
Dept: GENERAL RADIOLOGY | Age: 64
DRG: 535 | End: 2019-10-02
Payer: COMMERCIAL

## 2019-10-02 DIAGNOSIS — G40.909 SEIZURE DISORDER (HCC): ICD-10-CM

## 2019-10-02 DIAGNOSIS — S32.392A: Primary | ICD-10-CM

## 2019-10-02 DIAGNOSIS — R52 PAIN: ICD-10-CM

## 2019-10-02 PROBLEM — S72.002A HIP FRACTURE, LEFT, CLOSED, INITIAL ENCOUNTER (HCC): Status: ACTIVE | Noted: 2019-10-02

## 2019-10-02 LAB
ANION GAP SERPL CALCULATED.3IONS-SCNC: 12 MMOL/L (ref 7–16)
ANISOCYTOSIS: ABNORMAL
BASOPHILS ABSOLUTE: 0.01 E9/L (ref 0–0.2)
BASOPHILS RELATIVE PERCENT: 0.2 % (ref 0–2)
BUN BLDV-MCNC: 13 MG/DL (ref 8–23)
CALCIUM SERPL-MCNC: 9.1 MG/DL (ref 8.6–10.2)
CHLORIDE BLD-SCNC: 97 MMOL/L (ref 98–107)
CO2: 31 MMOL/L (ref 22–29)
CREAT SERPL-MCNC: 2.5 MG/DL (ref 0.5–1)
EOSINOPHILS ABSOLUTE: 0.08 E9/L (ref 0.05–0.5)
EOSINOPHILS RELATIVE PERCENT: 1.2 % (ref 0–6)
GFR AFRICAN AMERICAN: 24
GFR NON-AFRICAN AMERICAN: 19 ML/MIN/1.73
GLUCOSE BLD-MCNC: 111 MG/DL (ref 74–99)
HCT VFR BLD CALC: 30.8 % (ref 34–48)
HEMOGLOBIN: 10.3 G/DL (ref 11.5–15.5)
HYPOCHROMIA: ABNORMAL
IMMATURE GRANULOCYTES #: 0.04 E9/L
IMMATURE GRANULOCYTES %: 0.6 % (ref 0–5)
LYMPHOCYTES ABSOLUTE: 1.26 E9/L (ref 1.5–4)
LYMPHOCYTES RELATIVE PERCENT: 19.5 % (ref 20–42)
MAGNESIUM: 2.1 MG/DL (ref 1.6–2.6)
MCH RBC QN AUTO: 38.6 PG (ref 26–35)
MCHC RBC AUTO-ENTMCNC: 33.4 % (ref 32–34.5)
MCV RBC AUTO: 115.4 FL (ref 80–99.9)
MONOCYTES ABSOLUTE: 0.53 E9/L (ref 0.1–0.95)
MONOCYTES RELATIVE PERCENT: 8.2 % (ref 2–12)
NEUTROPHILS ABSOLUTE: 4.54 E9/L (ref 1.8–7.3)
NEUTROPHILS RELATIVE PERCENT: 70.3 % (ref 43–80)
PDW BLD-RTO: 14.1 FL (ref 11.5–15)
PLATELET # BLD: 153 E9/L (ref 130–450)
PMV BLD AUTO: 9.4 FL (ref 7–12)
POTASSIUM REFLEX MAGNESIUM: 3.8 MMOL/L (ref 3.5–5)
RBC # BLD: 2.67 E12/L (ref 3.5–5.5)
SODIUM BLD-SCNC: 140 MMOL/L (ref 132–146)
WBC # BLD: 6.5 E9/L (ref 4.5–11.5)

## 2019-10-02 PROCEDURE — 80048 BASIC METABOLIC PNL TOTAL CA: CPT

## 2019-10-02 PROCEDURE — 6370000000 HC RX 637 (ALT 250 FOR IP): Performed by: EMERGENCY MEDICINE

## 2019-10-02 PROCEDURE — 72125 CT NECK SPINE W/O DYE: CPT

## 2019-10-02 PROCEDURE — 6360000002 HC RX W HCPCS: Performed by: EMERGENCY MEDICINE

## 2019-10-02 PROCEDURE — 73700 CT LOWER EXTREMITY W/O DYE: CPT

## 2019-10-02 PROCEDURE — 83735 ASSAY OF MAGNESIUM: CPT

## 2019-10-02 PROCEDURE — 96374 THER/PROPH/DIAG INJ IV PUSH: CPT

## 2019-10-02 PROCEDURE — 99285 EMERGENCY DEPT VISIT HI MDM: CPT

## 2019-10-02 PROCEDURE — 1200000000 HC SEMI PRIVATE

## 2019-10-02 PROCEDURE — 85025 COMPLETE CBC W/AUTO DIFF WBC: CPT

## 2019-10-02 PROCEDURE — 93005 ELECTROCARDIOGRAM TRACING: CPT | Performed by: EMERGENCY MEDICINE

## 2019-10-02 PROCEDURE — 80177 DRUG SCRN QUAN LEVETIRACETAM: CPT

## 2019-10-02 PROCEDURE — 73502 X-RAY EXAM HIP UNI 2-3 VIEWS: CPT

## 2019-10-02 PROCEDURE — 70450 CT HEAD/BRAIN W/O DYE: CPT

## 2019-10-02 RX ORDER — ACETAMINOPHEN 325 MG/1
650 TABLET ORAL EVERY 4 HOURS PRN
Status: DISCONTINUED | OUTPATIENT
Start: 2019-10-02 | End: 2019-10-08 | Stop reason: HOSPADM

## 2019-10-02 RX ORDER — HYDROCODONE BITARTRATE AND ACETAMINOPHEN 7.5; 325 MG/1; MG/1
1 TABLET ORAL EVERY 6 HOURS PRN
Status: DISCONTINUED | OUTPATIENT
Start: 2019-10-02 | End: 2019-10-08 | Stop reason: HOSPADM

## 2019-10-02 RX ORDER — LACOSAMIDE 100 MG/1
200 TABLET ORAL EVERY MORNING
Status: DISCONTINUED | OUTPATIENT
Start: 2019-10-03 | End: 2019-10-08 | Stop reason: HOSPADM

## 2019-10-02 RX ORDER — LEVETIRACETAM 100 MG/ML
1500 SOLUTION ORAL 2 TIMES DAILY
Status: DISCONTINUED | OUTPATIENT
Start: 2019-10-03 | End: 2019-10-08 | Stop reason: HOSPADM

## 2019-10-02 RX ORDER — ACETAMINOPHEN 500 MG
1000 TABLET ORAL ONCE
Status: COMPLETED | OUTPATIENT
Start: 2019-10-02 | End: 2019-10-02

## 2019-10-02 RX ORDER — HYDROXYZINE PAMOATE 25 MG/1
25 CAPSULE ORAL ONCE
Status: DISCONTINUED | OUTPATIENT
Start: 2019-10-03 | End: 2019-10-08 | Stop reason: HOSPADM

## 2019-10-02 RX ORDER — SODIUM CHLORIDE 0.9 % (FLUSH) 0.9 %
10 SYRINGE (ML) INJECTION EVERY 12 HOURS SCHEDULED
Status: DISCONTINUED | OUTPATIENT
Start: 2019-10-02 | End: 2019-10-08 | Stop reason: HOSPADM

## 2019-10-02 RX ORDER — LACOSAMIDE 50 MG/1
250 TABLET ORAL
Status: ON HOLD | COMMUNITY
End: 2022-08-17 | Stop reason: HOSPADM

## 2019-10-02 RX ORDER — PRIMIDONE 50 MG/1
150 TABLET ORAL 2 TIMES DAILY
Status: DISCONTINUED | OUTPATIENT
Start: 2019-10-03 | End: 2019-10-08 | Stop reason: HOSPADM

## 2019-10-02 RX ORDER — SODIUM BICARBONATE 650 MG/1
650 TABLET ORAL 3 TIMES DAILY
Status: DISCONTINUED | OUTPATIENT
Start: 2019-10-03 | End: 2019-10-08 | Stop reason: HOSPADM

## 2019-10-02 RX ORDER — LACOSAMIDE 100 MG/1
200 TABLET ORAL ONCE
Status: DISCONTINUED | OUTPATIENT
Start: 2019-10-02 | End: 2019-10-08 | Stop reason: HOSPADM

## 2019-10-02 RX ORDER — AMLODIPINE BESYLATE 10 MG/1
10 TABLET ORAL DAILY
Status: DISCONTINUED | OUTPATIENT
Start: 2019-10-03 | End: 2019-10-08 | Stop reason: HOSPADM

## 2019-10-02 RX ORDER — TACROLIMUS 1 MG/1
3 CAPSULE ORAL 2 TIMES DAILY
Status: DISCONTINUED | OUTPATIENT
Start: 2019-10-03 | End: 2019-10-03

## 2019-10-02 RX ORDER — SODIUM CHLORIDE 0.9 % (FLUSH) 0.9 %
10 SYRINGE (ML) INJECTION PRN
Status: DISCONTINUED | OUTPATIENT
Start: 2019-10-02 | End: 2019-10-08 | Stop reason: HOSPADM

## 2019-10-02 RX ORDER — HYDROCODONE BITARTRATE AND ACETAMINOPHEN 7.5; 325 MG/1; MG/1
1 TABLET ORAL ONCE
Status: COMPLETED | OUTPATIENT
Start: 2019-10-02 | End: 2019-10-02

## 2019-10-02 RX ORDER — LACOSAMIDE 50 MG/1
50 TABLET ORAL
Status: DISCONTINUED | OUTPATIENT
Start: 2019-10-03 | End: 2019-10-08 | Stop reason: HOSPADM

## 2019-10-02 RX ORDER — LACOSAMIDE 100 MG/1
200 TABLET ORAL 2 TIMES DAILY
Status: DISCONTINUED | OUTPATIENT
Start: 2019-10-02 | End: 2019-10-02

## 2019-10-02 RX ORDER — CHOLECALCIFEROL (VITAMIN D3) 10 MCG
1 TABLET ORAL
Status: DISCONTINUED | OUTPATIENT
Start: 2019-10-03 | End: 2019-10-08 | Stop reason: HOSPADM

## 2019-10-02 RX ORDER — PREDNISONE 1 MG/1
5 TABLET ORAL EVERY MORNING
Status: DISCONTINUED | OUTPATIENT
Start: 2019-10-03 | End: 2019-10-08 | Stop reason: HOSPADM

## 2019-10-02 RX ORDER — LEVETIRACETAM 5 MG/ML
500 INJECTION INTRAVASCULAR ONCE
Status: DISCONTINUED | OUTPATIENT
Start: 2019-10-02 | End: 2019-10-02

## 2019-10-02 RX ORDER — MORPHINE SULFATE 2 MG/ML
2 INJECTION, SOLUTION INTRAMUSCULAR; INTRAVENOUS ONCE
Status: COMPLETED | OUTPATIENT
Start: 2019-10-02 | End: 2019-10-02

## 2019-10-02 RX ORDER — LEVETIRACETAM 500 MG/1
1500 TABLET ORAL ONCE
Status: COMPLETED | OUTPATIENT
Start: 2019-10-02 | End: 2019-10-02

## 2019-10-02 RX ORDER — CARVEDILOL 6.25 MG/1
6.25 TABLET ORAL 3 TIMES DAILY
Status: DISCONTINUED | OUTPATIENT
Start: 2019-10-03 | End: 2019-10-08 | Stop reason: HOSPADM

## 2019-10-02 RX ORDER — TACROLIMUS 1 MG/1
2 CAPSULE ORAL NIGHTLY
Status: DISCONTINUED | OUTPATIENT
Start: 2019-10-03 | End: 2019-10-03

## 2019-10-02 RX ADMIN — ACETAMINOPHEN 1000 MG: 500 TABLET ORAL at 15:51

## 2019-10-02 RX ADMIN — HYDROCODONE BITARTRATE AND ACETAMINOPHEN 1 TABLET: 7.5; 325 TABLET ORAL at 18:57

## 2019-10-02 RX ADMIN — LACOSAMIDE 200 MG: 100 TABLET, FILM COATED ORAL at 19:49

## 2019-10-02 RX ADMIN — MORPHINE SULFATE 2 MG: 2 INJECTION, SOLUTION INTRAMUSCULAR; INTRAVENOUS at 22:38

## 2019-10-02 RX ADMIN — LEVETIRACETAM 1500 MG: 500 TABLET, FILM COATED ORAL at 19:12

## 2019-10-02 ASSESSMENT — ENCOUNTER SYMPTOMS
NAUSEA: 0
DIARRHEA: 0
ABDOMINAL PAIN: 0
CONSTIPATION: 0
WHEEZING: 0
SHORTNESS OF BREATH: 0
BLOOD IN STOOL: 0
COUGH: 0
ABDOMINAL DISTENTION: 0
VOMITING: 0

## 2019-10-02 ASSESSMENT — PAIN SCALES - GENERAL
PAINLEVEL_OUTOF10: 7
PAINLEVEL_OUTOF10: 9
PAINLEVEL_OUTOF10: 8
PAINLEVEL_OUTOF10: 10
PAINLEVEL_OUTOF10: 5

## 2019-10-02 ASSESSMENT — PAIN DESCRIPTION - DESCRIPTORS: DESCRIPTORS: THROBBING

## 2019-10-02 ASSESSMENT — PAIN DESCRIPTION - PAIN TYPE: TYPE: ACUTE PAIN

## 2019-10-02 ASSESSMENT — PAIN DESCRIPTION - FREQUENCY: FREQUENCY: CONTINUOUS

## 2019-10-02 ASSESSMENT — PAIN DESCRIPTION - LOCATION: LOCATION: HIP

## 2019-10-02 ASSESSMENT — PAIN DESCRIPTION - ORIENTATION: ORIENTATION: LEFT

## 2019-10-03 ENCOUNTER — APPOINTMENT (OUTPATIENT)
Dept: GENERAL RADIOLOGY | Age: 64
DRG: 535 | End: 2019-10-03
Payer: COMMERCIAL

## 2019-10-03 PROBLEM — S32.592A INFERIOR PUBIC RAMUS FRACTURE, LEFT, CLOSED, INITIAL ENCOUNTER (HCC): Status: ACTIVE | Noted: 2019-10-02

## 2019-10-03 PROBLEM — R11.2 NAUSEA & VOMITING: Status: ACTIVE | Noted: 2019-10-03

## 2019-10-03 LAB
ANION GAP SERPL CALCULATED.3IONS-SCNC: 16 MMOL/L (ref 7–16)
BACTERIA: ABNORMAL /HPF
BILIRUBIN URINE: ABNORMAL
BLOOD, URINE: ABNORMAL
BUN BLDV-MCNC: 21 MG/DL (ref 8–23)
CALCIUM SERPL-MCNC: 9.4 MG/DL (ref 8.6–10.2)
CHLORIDE BLD-SCNC: 95 MMOL/L (ref 98–107)
CLARITY: CLEAR
CO2: 28 MMOL/L (ref 22–29)
COLOR: YELLOW
CREAT SERPL-MCNC: 3.7 MG/DL (ref 0.5–1)
EKG ATRIAL RATE: 76 BPM
EKG P AXIS: 55 DEGREES
EKG P-R INTERVAL: 180 MS
EKG Q-T INTERVAL: 410 MS
EKG QRS DURATION: 82 MS
EKG QTC CALCULATION (BAZETT): 461 MS
EKG R AXIS: 49 DEGREES
EKG T AXIS: 25 DEGREES
EKG VENTRICULAR RATE: 76 BPM
EPITHELIAL CELLS, UA: ABNORMAL /HPF
GFR AFRICAN AMERICAN: 15
GFR NON-AFRICAN AMERICAN: 12 ML/MIN/1.73
GLUCOSE BLD-MCNC: 105 MG/DL (ref 74–99)
GLUCOSE URINE: NEGATIVE MG/DL
HCT VFR BLD CALC: 30.4 % (ref 34–48)
HEMOGLOBIN: 10.2 G/DL (ref 11.5–15.5)
INR BLD: 1
KETONES, URINE: NEGATIVE MG/DL
LEUKOCYTE ESTERASE, URINE: ABNORMAL
MCH RBC QN AUTO: 39.5 PG (ref 26–35)
MCHC RBC AUTO-ENTMCNC: 33.6 % (ref 32–34.5)
MCV RBC AUTO: 117.8 FL (ref 80–99.9)
NITRITE, URINE: NEGATIVE
PDW BLD-RTO: 14.1 FL (ref 11.5–15)
PH UA: 7 (ref 5–9)
PLATELET # BLD: 127 E9/L (ref 130–450)
PMV BLD AUTO: 10.5 FL (ref 7–12)
POTASSIUM SERPL-SCNC: 4.2 MMOL/L (ref 3.5–5)
PROTEIN UA: 100 MG/DL
PROTHROMBIN TIME: 11.1 SEC (ref 9.3–12.4)
RBC # BLD: 2.58 E12/L (ref 3.5–5.5)
RBC UA: ABNORMAL /HPF (ref 0–2)
SODIUM BLD-SCNC: 139 MMOL/L (ref 132–146)
SPECIFIC GRAVITY UA: 1.01 (ref 1–1.03)
UROBILINOGEN, URINE: 0.2 E.U./DL
WBC # BLD: 4.5 E9/L (ref 4.5–11.5)
WBC UA: ABNORMAL /HPF (ref 0–5)

## 2019-10-03 PROCEDURE — 6360000002 HC RX W HCPCS: Performed by: INTERNAL MEDICINE

## 2019-10-03 PROCEDURE — 85027 COMPLETE CBC AUTOMATED: CPT

## 2019-10-03 PROCEDURE — 93010 ELECTROCARDIOGRAM REPORT: CPT | Performed by: INTERNAL MEDICINE

## 2019-10-03 PROCEDURE — 1200000000 HC SEMI PRIVATE

## 2019-10-03 PROCEDURE — 36415 COLL VENOUS BLD VENIPUNCTURE: CPT

## 2019-10-03 PROCEDURE — 96375 TX/PRO/DX INJ NEW DRUG ADDON: CPT

## 2019-10-03 PROCEDURE — 6370000000 HC RX 637 (ALT 250 FOR IP): Performed by: EMERGENCY MEDICINE

## 2019-10-03 PROCEDURE — 6360000002 HC RX W HCPCS: Performed by: EMERGENCY MEDICINE

## 2019-10-03 PROCEDURE — 2580000003 HC RX 258: Performed by: EMERGENCY MEDICINE

## 2019-10-03 PROCEDURE — 80048 BASIC METABOLIC PNL TOTAL CA: CPT

## 2019-10-03 PROCEDURE — 81001 URINALYSIS AUTO W/SCOPE: CPT

## 2019-10-03 PROCEDURE — 5A1D70Z PERFORMANCE OF URINARY FILTRATION, INTERMITTENT, LESS THAN 6 HOURS PER DAY: ICD-10-PCS | Performed by: INTERNAL MEDICINE

## 2019-10-03 PROCEDURE — 97530 THERAPEUTIC ACTIVITIES: CPT

## 2019-10-03 PROCEDURE — 85610 PROTHROMBIN TIME: CPT

## 2019-10-03 PROCEDURE — 6370000000 HC RX 637 (ALT 250 FOR IP): Performed by: INTERNAL MEDICINE

## 2019-10-03 PROCEDURE — 96372 THER/PROPH/DIAG INJ SC/IM: CPT

## 2019-10-03 PROCEDURE — 97165 OT EVAL LOW COMPLEX 30 MIN: CPT

## 2019-10-03 RX ORDER — LACOSAMIDE 100 MG/1
100 TABLET ORAL
Status: DISCONTINUED | OUTPATIENT
Start: 2019-10-04 | End: 2019-10-08 | Stop reason: HOSPADM

## 2019-10-03 RX ORDER — TACROLIMUS 1 MG/1
3 CAPSULE ORAL 2 TIMES DAILY
Status: DISCONTINUED | OUTPATIENT
Start: 2019-10-03 | End: 2019-10-08 | Stop reason: HOSPADM

## 2019-10-03 RX ORDER — LEVETIRACETAM 100 MG/ML
1000 SOLUTION ORAL
Status: DISCONTINUED | OUTPATIENT
Start: 2019-10-04 | End: 2019-10-08 | Stop reason: HOSPADM

## 2019-10-03 RX ORDER — LEVETIRACETAM 100 MG/ML
500 SOLUTION ORAL
Status: DISCONTINUED | OUTPATIENT
Start: 2019-10-04 | End: 2019-10-08 | Stop reason: HOSPADM

## 2019-10-03 RX ORDER — LIDOCAINE 40 MG/G
CREAM TOPICAL PRN
Status: DISCONTINUED | OUTPATIENT
Start: 2019-10-04 | End: 2019-10-08 | Stop reason: HOSPADM

## 2019-10-03 RX ORDER — PRIMIDONE 50 MG/1
100 TABLET ORAL
Status: DISCONTINUED | OUTPATIENT
Start: 2019-10-04 | End: 2019-10-08 | Stop reason: HOSPADM

## 2019-10-03 RX ORDER — TACROLIMUS 1 MG/1
2 CAPSULE ORAL NIGHTLY
Status: DISCONTINUED | OUTPATIENT
Start: 2019-10-03 | End: 2019-10-08 | Stop reason: HOSPADM

## 2019-10-03 RX ORDER — ONDANSETRON 2 MG/ML
4 INJECTION INTRAMUSCULAR; INTRAVENOUS EVERY 6 HOURS PRN
Status: DISCONTINUED | OUTPATIENT
Start: 2019-10-03 | End: 2019-10-08 | Stop reason: HOSPADM

## 2019-10-03 RX ADMIN — ENOXAPARIN SODIUM 30 MG: 30 INJECTION SUBCUTANEOUS at 11:40

## 2019-10-03 RX ADMIN — PRIMIDONE 150 MG: 50 TABLET ORAL at 14:23

## 2019-10-03 RX ADMIN — LEVETIRACETAM 1500 MG: 100 SOLUTION ORAL at 05:59

## 2019-10-03 RX ADMIN — PRIMIDONE 150 MG: 50 TABLET ORAL at 07:03

## 2019-10-03 RX ADMIN — SODIUM BICARBONATE 650 MG: 650 TABLET ORAL at 11:29

## 2019-10-03 RX ADMIN — SODIUM BICARBONATE 650 MG: 650 TABLET ORAL at 14:23

## 2019-10-03 RX ADMIN — AMLODIPINE BESYLATE 10 MG: 10 TABLET ORAL at 11:28

## 2019-10-03 RX ADMIN — ACETAMINOPHEN 650 MG: 325 TABLET ORAL at 05:59

## 2019-10-03 RX ADMIN — CARVEDILOL 6.25 MG: 6.25 TABLET, FILM COATED ORAL at 21:37

## 2019-10-03 RX ADMIN — LACOSAMIDE 250 MG: 100 TABLET, FILM COATED ORAL at 17:07

## 2019-10-03 RX ADMIN — PREDNISONE 5 MG: 5 TABLET ORAL at 11:30

## 2019-10-03 RX ADMIN — HYDROCODONE BITARTRATE AND ACETAMINOPHEN 1 TABLET: 7.5; 325 TABLET ORAL at 14:23

## 2019-10-03 RX ADMIN — ACETAMINOPHEN 650 MG: 325 TABLET ORAL at 11:40

## 2019-10-03 RX ADMIN — HYDROCODONE BITARTRATE AND ACETAMINOPHEN 1 TABLET: 7.5; 325 TABLET ORAL at 00:48

## 2019-10-03 RX ADMIN — TACROLIMUS 3 MG: 1 CAPSULE ORAL at 13:29

## 2019-10-03 RX ADMIN — Medication 10 ML: at 11:30

## 2019-10-03 RX ADMIN — ONDANSETRON 4 MG: 2 INJECTION INTRAMUSCULAR; INTRAVENOUS at 06:34

## 2019-10-03 RX ADMIN — LEVETIRACETAM 1500 MG: 100 SOLUTION ORAL at 16:30

## 2019-10-03 RX ADMIN — Medication 10 ML: at 21:38

## 2019-10-03 RX ADMIN — CARVEDILOL 6.25 MG: 6.25 TABLET, FILM COATED ORAL at 11:28

## 2019-10-03 RX ADMIN — LACOSAMIDE 200 MG: 100 TABLET, FILM COATED ORAL at 07:00

## 2019-10-03 ASSESSMENT — PAIN SCALES - GENERAL
PAINLEVEL_OUTOF10: 4
PAINLEVEL_OUTOF10: 0
PAINLEVEL_OUTOF10: 3
PAINLEVEL_OUTOF10: 0
PAINLEVEL_OUTOF10: 0
PAINLEVEL_OUTOF10: 5
PAINLEVEL_OUTOF10: 7

## 2019-10-03 ASSESSMENT — PAIN DESCRIPTION - FREQUENCY: FREQUENCY: CONTINUOUS

## 2019-10-03 ASSESSMENT — PAIN DESCRIPTION - DESCRIPTORS
DESCRIPTORS: ACHING;SORE
DESCRIPTORS: ACHING;SORE

## 2019-10-03 ASSESSMENT — PAIN DESCRIPTION - LOCATION
LOCATION: HIP
LOCATION: HIP

## 2019-10-03 ASSESSMENT — PAIN DESCRIPTION - ORIENTATION
ORIENTATION: LEFT
ORIENTATION: LEFT

## 2019-10-03 ASSESSMENT — PAIN DESCRIPTION - PROGRESSION: CLINICAL_PROGRESSION: GRADUALLY WORSENING

## 2019-10-03 ASSESSMENT — PAIN DESCRIPTION - PAIN TYPE
TYPE: ACUTE PAIN
TYPE: ACUTE PAIN

## 2019-10-03 ASSESSMENT — PAIN - FUNCTIONAL ASSESSMENT: PAIN_FUNCTIONAL_ASSESSMENT: PREVENTS OR INTERFERES WITH MANY ACTIVE NOT PASSIVE ACTIVITIES

## 2019-10-03 ASSESSMENT — PAIN DESCRIPTION - ONSET: ONSET: ON-GOING

## 2019-10-04 LAB
ALBUMIN SERPL-MCNC: 4.1 G/DL (ref 3.5–5.2)
ALP BLD-CCNC: 130 U/L (ref 35–104)
ALT SERPL-CCNC: 86 U/L (ref 0–32)
ANION GAP SERPL CALCULATED.3IONS-SCNC: 17 MMOL/L (ref 7–16)
AST SERPL-CCNC: 75 U/L (ref 0–31)
BILIRUB SERPL-MCNC: 0.4 MG/DL (ref 0–1.2)
BUN BLDV-MCNC: 34 MG/DL (ref 8–23)
CALCIUM SERPL-MCNC: 9.3 MG/DL (ref 8.6–10.2)
CHLORIDE BLD-SCNC: 92 MMOL/L (ref 98–107)
CO2: 29 MMOL/L (ref 22–29)
CREAT SERPL-MCNC: 5.3 MG/DL (ref 0.5–1)
GFR AFRICAN AMERICAN: 10
GFR NON-AFRICAN AMERICAN: 8 ML/MIN/1.73
GLUCOSE BLD-MCNC: 128 MG/DL (ref 74–99)
HCT VFR BLD CALC: 28.7 % (ref 34–48)
HEMOGLOBIN: 9.8 G/DL (ref 11.5–15.5)
MAGNESIUM: 2.3 MG/DL (ref 1.6–2.6)
MCH RBC QN AUTO: 39.4 PG (ref 26–35)
MCHC RBC AUTO-ENTMCNC: 34.1 % (ref 32–34.5)
MCV RBC AUTO: 115.3 FL (ref 80–99.9)
PDW BLD-RTO: 14 FL (ref 11.5–15)
PHOSPHORUS: 4.1 MG/DL (ref 2.5–4.5)
PLATELET # BLD: 118 E9/L (ref 130–450)
PMV BLD AUTO: 10.9 FL (ref 7–12)
POTASSIUM SERPL-SCNC: 4.4 MMOL/L (ref 3.5–5)
RBC # BLD: 2.49 E12/L (ref 3.5–5.5)
SODIUM BLD-SCNC: 138 MMOL/L (ref 132–146)
TOTAL PROTEIN: 7 G/DL (ref 6.4–8.3)
WBC # BLD: 5.2 E9/L (ref 4.5–11.5)

## 2019-10-04 PROCEDURE — 84100 ASSAY OF PHOSPHORUS: CPT

## 2019-10-04 PROCEDURE — 83735 ASSAY OF MAGNESIUM: CPT

## 2019-10-04 PROCEDURE — 1200000000 HC SEMI PRIVATE

## 2019-10-04 PROCEDURE — 90935 HEMODIALYSIS ONE EVALUATION: CPT

## 2019-10-04 PROCEDURE — 80074 ACUTE HEPATITIS PANEL: CPT

## 2019-10-04 PROCEDURE — 85027 COMPLETE CBC AUTOMATED: CPT

## 2019-10-04 PROCEDURE — 6360000002 HC RX W HCPCS: Performed by: EMERGENCY MEDICINE

## 2019-10-04 PROCEDURE — 6370000000 HC RX 637 (ALT 250 FOR IP): Performed by: INTERNAL MEDICINE

## 2019-10-04 PROCEDURE — 6360000002 HC RX W HCPCS: Performed by: INTERNAL MEDICINE

## 2019-10-04 PROCEDURE — 86706 HEP B SURFACE ANTIBODY: CPT

## 2019-10-04 PROCEDURE — 2580000003 HC RX 258: Performed by: EMERGENCY MEDICINE

## 2019-10-04 PROCEDURE — 96372 THER/PROPH/DIAG INJ SC/IM: CPT

## 2019-10-04 PROCEDURE — 36415 COLL VENOUS BLD VENIPUNCTURE: CPT

## 2019-10-04 PROCEDURE — 80053 COMPREHEN METABOLIC PANEL: CPT

## 2019-10-04 PROCEDURE — 97530 THERAPEUTIC ACTIVITIES: CPT

## 2019-10-04 RX ORDER — PRIMIDONE 250 MG/1
250 TABLET ORAL ONCE
Status: COMPLETED | OUTPATIENT
Start: 2019-10-04 | End: 2019-10-04

## 2019-10-04 RX ORDER — LACOSAMIDE 100 MG/1
100 TABLET ORAL ONCE
Status: COMPLETED | OUTPATIENT
Start: 2019-10-04 | End: 2019-10-04

## 2019-10-04 RX ADMIN — LEVETIRACETAM 1500 MG: 100 SOLUTION ORAL at 12:52

## 2019-10-04 RX ADMIN — CARVEDILOL 6.25 MG: 6.25 TABLET, FILM COATED ORAL at 20:59

## 2019-10-04 RX ADMIN — LACOSAMIDE 100 MG: 100 TABLET, FILM COATED ORAL at 20:59

## 2019-10-04 RX ADMIN — PREDNISONE 5 MG: 5 TABLET ORAL at 12:51

## 2019-10-04 RX ADMIN — SODIUM BICARBONATE 650 MG: 650 TABLET ORAL at 12:51

## 2019-10-04 RX ADMIN — TACROLIMUS 2 MG: 1 CAPSULE ORAL at 18:57

## 2019-10-04 RX ADMIN — TACROLIMUS 3 MG: 1 CAPSULE ORAL at 06:04

## 2019-10-04 RX ADMIN — LEVETIRACETAM 500 MG: 100 SOLUTION ORAL at 12:53

## 2019-10-04 RX ADMIN — PRIMIDONE 250 MG: 250 TABLET ORAL at 20:59

## 2019-10-04 RX ADMIN — ENOXAPARIN SODIUM 30 MG: 30 INJECTION SUBCUTANEOUS at 12:52

## 2019-10-04 RX ADMIN — LACOSAMIDE 200 MG: 100 TABLET, FILM COATED ORAL at 06:02

## 2019-10-04 RX ADMIN — PRIMIDONE 150 MG: 50 TABLET ORAL at 06:04

## 2019-10-04 RX ADMIN — LEVETIRACETAM 1500 MG: 100 SOLUTION ORAL at 06:04

## 2019-10-04 RX ADMIN — LEVETIRACETAM 1000 MG: 100 SOLUTION ORAL at 06:06

## 2019-10-04 RX ADMIN — LACOSAMIDE 100 MG: 100 TABLET, FILM COATED ORAL at 12:50

## 2019-10-04 RX ADMIN — CARVEDILOL 6.25 MG: 6.25 TABLET, FILM COATED ORAL at 12:51

## 2019-10-04 RX ADMIN — LACOSAMIDE 250 MG: 100 TABLET, FILM COATED ORAL at 12:58

## 2019-10-04 RX ADMIN — AMLODIPINE BESYLATE 10 MG: 10 TABLET ORAL at 12:51

## 2019-10-04 RX ADMIN — Medication 10 ML: at 13:06

## 2019-10-04 RX ADMIN — Medication 10 ML: at 21:00

## 2019-10-04 RX ADMIN — PRIMIDONE 100 MG: 50 TABLET ORAL at 12:50

## 2019-10-04 RX ADMIN — NEPHROCAP 1 MG: 1 CAP ORAL at 12:51

## 2019-10-04 RX ADMIN — PRIMIDONE 150 MG: 50 TABLET ORAL at 12:49

## 2019-10-04 RX ADMIN — LIDOCAINE 4%: 4 CREAM TOPICAL at 06:21

## 2019-10-04 RX ADMIN — SODIUM BICARBONATE 650 MG: 650 TABLET ORAL at 20:59

## 2019-10-04 RX ADMIN — TACROLIMUS 3 MG: 1 CAPSULE ORAL at 12:50

## 2019-10-04 ASSESSMENT — PAIN SCALES - GENERAL
PAINLEVEL_OUTOF10: 0

## 2019-10-05 LAB
ALBUMIN SERPL-MCNC: 4.2 G/DL (ref 3.5–5.2)
ALP BLD-CCNC: 124 U/L (ref 35–104)
ALT SERPL-CCNC: 58 U/L (ref 0–32)
ANION GAP SERPL CALCULATED.3IONS-SCNC: 14 MMOL/L (ref 7–16)
AST SERPL-CCNC: 39 U/L (ref 0–31)
BILIRUB SERPL-MCNC: 0.4 MG/DL (ref 0–1.2)
BUN BLDV-MCNC: 22 MG/DL (ref 8–23)
CALCIUM SERPL-MCNC: 9.3 MG/DL (ref 8.6–10.2)
CHLORIDE BLD-SCNC: 92 MMOL/L (ref 98–107)
CO2: 28 MMOL/L (ref 22–29)
CREAT SERPL-MCNC: 3.5 MG/DL (ref 0.5–1)
GFR AFRICAN AMERICAN: 16
GFR NON-AFRICAN AMERICAN: 13 ML/MIN/1.73
GLUCOSE BLD-MCNC: 97 MG/DL (ref 74–99)
HCT VFR BLD CALC: 30.2 % (ref 34–48)
HEMOGLOBIN: 10.1 G/DL (ref 11.5–15.5)
KEPPRA: 126 UG/ML (ref 12–46)
MAGNESIUM: 2.2 MG/DL (ref 1.6–2.6)
MCH RBC QN AUTO: 38.4 PG (ref 26–35)
MCHC RBC AUTO-ENTMCNC: 33.4 % (ref 32–34.5)
MCV RBC AUTO: 114.8 FL (ref 80–99.9)
PDW BLD-RTO: 14 FL (ref 11.5–15)
PHOSPHORUS: 2.9 MG/DL (ref 2.5–4.5)
PLATELET # BLD: 135 E9/L (ref 130–450)
PMV BLD AUTO: 11 FL (ref 7–12)
POTASSIUM SERPL-SCNC: 4 MMOL/L (ref 3.5–5)
RBC # BLD: 2.63 E12/L (ref 3.5–5.5)
SODIUM BLD-SCNC: 134 MMOL/L (ref 132–146)
TOTAL PROTEIN: 7.3 G/DL (ref 6.4–8.3)
WBC # BLD: 5.3 E9/L (ref 4.5–11.5)

## 2019-10-05 PROCEDURE — 1200000000 HC SEMI PRIVATE

## 2019-10-05 PROCEDURE — 6360000002 HC RX W HCPCS: Performed by: INTERNAL MEDICINE

## 2019-10-05 PROCEDURE — 96372 THER/PROPH/DIAG INJ SC/IM: CPT

## 2019-10-05 PROCEDURE — 83735 ASSAY OF MAGNESIUM: CPT

## 2019-10-05 PROCEDURE — 6360000002 HC RX W HCPCS: Performed by: EMERGENCY MEDICINE

## 2019-10-05 PROCEDURE — 6370000000 HC RX 637 (ALT 250 FOR IP): Performed by: INTERNAL MEDICINE

## 2019-10-05 PROCEDURE — 2580000003 HC RX 258: Performed by: EMERGENCY MEDICINE

## 2019-10-05 PROCEDURE — 80053 COMPREHEN METABOLIC PANEL: CPT

## 2019-10-05 PROCEDURE — 84100 ASSAY OF PHOSPHORUS: CPT

## 2019-10-05 PROCEDURE — 85027 COMPLETE CBC AUTOMATED: CPT

## 2019-10-05 PROCEDURE — 36415 COLL VENOUS BLD VENIPUNCTURE: CPT

## 2019-10-05 RX ORDER — HYDROCODONE BITARTRATE AND ACETAMINOPHEN 7.5; 325 MG/1; MG/1
1 TABLET ORAL EVERY 6 HOURS PRN
Qty: 56 TABLET | Refills: 0 | Status: SHIPPED | OUTPATIENT
Start: 2019-10-05 | End: 2019-10-19

## 2019-10-05 RX ORDER — HEPARIN SODIUM 10000 [USP'U]/ML
5000 INJECTION, SOLUTION INTRAVENOUS; SUBCUTANEOUS EVERY 12 HOURS
Status: DISCONTINUED | OUTPATIENT
Start: 2019-10-06 | End: 2019-10-08 | Stop reason: HOSPADM

## 2019-10-05 RX ADMIN — TACROLIMUS 2 MG: 1 CAPSULE ORAL at 17:49

## 2019-10-05 RX ADMIN — PRIMIDONE 150 MG: 50 TABLET ORAL at 13:40

## 2019-10-05 RX ADMIN — SODIUM BICARBONATE 650 MG: 650 TABLET ORAL at 13:40

## 2019-10-05 RX ADMIN — PRIMIDONE 150 MG: 50 TABLET ORAL at 06:49

## 2019-10-05 RX ADMIN — Medication 10 ML: at 21:33

## 2019-10-05 RX ADMIN — CARVEDILOL 6.25 MG: 6.25 TABLET, FILM COATED ORAL at 13:40

## 2019-10-05 RX ADMIN — LACOSAMIDE 200 MG: 100 TABLET, FILM COATED ORAL at 06:49

## 2019-10-05 RX ADMIN — AMLODIPINE BESYLATE 10 MG: 10 TABLET ORAL at 08:33

## 2019-10-05 RX ADMIN — SODIUM BICARBONATE 650 MG: 650 TABLET ORAL at 08:33

## 2019-10-05 RX ADMIN — LACOSAMIDE 50 MG: 50 TABLET, FILM COATED ORAL at 13:41

## 2019-10-05 RX ADMIN — TACROLIMUS 3 MG: 1 CAPSULE ORAL at 08:26

## 2019-10-05 RX ADMIN — CARVEDILOL 6.25 MG: 6.25 TABLET, FILM COATED ORAL at 08:33

## 2019-10-05 RX ADMIN — Medication 10 ML: at 08:33

## 2019-10-05 RX ADMIN — ENOXAPARIN SODIUM 30 MG: 30 INJECTION SUBCUTANEOUS at 08:33

## 2019-10-05 RX ADMIN — HYDROCODONE BITARTRATE AND ACETAMINOPHEN 1 TABLET: 7.5; 325 TABLET ORAL at 13:15

## 2019-10-05 RX ADMIN — NEPHROCAP 1 MG: 1 CAP ORAL at 08:26

## 2019-10-05 RX ADMIN — LEVETIRACETAM 1500 MG: 100 SOLUTION ORAL at 13:41

## 2019-10-05 RX ADMIN — TACROLIMUS 3 MG: 1 CAPSULE ORAL at 13:15

## 2019-10-05 RX ADMIN — LEVETIRACETAM 1500 MG: 100 SOLUTION ORAL at 06:49

## 2019-10-05 RX ADMIN — PREDNISONE 5 MG: 5 TABLET ORAL at 08:33

## 2019-10-05 ASSESSMENT — PAIN SCALES - GENERAL
PAINLEVEL_OUTOF10: 5
PAINLEVEL_OUTOF10: 0
PAINLEVEL_OUTOF10: 0

## 2019-10-06 PROCEDURE — 96372 THER/PROPH/DIAG INJ SC/IM: CPT

## 2019-10-06 PROCEDURE — 1200000000 HC SEMI PRIVATE

## 2019-10-06 PROCEDURE — 2580000003 HC RX 258: Performed by: EMERGENCY MEDICINE

## 2019-10-06 PROCEDURE — 6360000002 HC RX W HCPCS: Performed by: INTERNAL MEDICINE

## 2019-10-06 PROCEDURE — 97161 PT EVAL LOW COMPLEX 20 MIN: CPT

## 2019-10-06 PROCEDURE — 97530 THERAPEUTIC ACTIVITIES: CPT

## 2019-10-06 PROCEDURE — 96376 TX/PRO/DX INJ SAME DRUG ADON: CPT

## 2019-10-06 PROCEDURE — 6370000000 HC RX 637 (ALT 250 FOR IP): Performed by: INTERNAL MEDICINE

## 2019-10-06 PROCEDURE — 97116 GAIT TRAINING THERAPY: CPT

## 2019-10-06 RX ADMIN — HEPARIN SODIUM 5000 UNITS: 10000 INJECTION, SOLUTION INTRAVENOUS; SUBCUTANEOUS at 21:17

## 2019-10-06 RX ADMIN — HYDROCODONE BITARTRATE AND ACETAMINOPHEN 1 TABLET: 7.5; 325 TABLET ORAL at 07:53

## 2019-10-06 RX ADMIN — PRIMIDONE 150 MG: 50 TABLET ORAL at 06:14

## 2019-10-06 RX ADMIN — NEPHROCAP 1 MG: 1 CAP ORAL at 07:54

## 2019-10-06 RX ADMIN — HEPARIN SODIUM 5000 UNITS: 10000 INJECTION, SOLUTION INTRAVENOUS; SUBCUTANEOUS at 07:55

## 2019-10-06 RX ADMIN — SODIUM BICARBONATE 650 MG: 650 TABLET ORAL at 14:26

## 2019-10-06 RX ADMIN — TACROLIMUS 2 MG: 1 CAPSULE ORAL at 17:21

## 2019-10-06 RX ADMIN — CARVEDILOL 6.25 MG: 6.25 TABLET, FILM COATED ORAL at 07:54

## 2019-10-06 RX ADMIN — LACOSAMIDE 50 MG: 50 TABLET, FILM COATED ORAL at 14:26

## 2019-10-06 RX ADMIN — AMLODIPINE BESYLATE 10 MG: 10 TABLET ORAL at 07:54

## 2019-10-06 RX ADMIN — LEVETIRACETAM 1500 MG: 100 SOLUTION ORAL at 06:14

## 2019-10-06 RX ADMIN — LACOSAMIDE 200 MG: 100 TABLET, FILM COATED ORAL at 06:13

## 2019-10-06 RX ADMIN — HYDROCODONE BITARTRATE AND ACETAMINOPHEN 1 TABLET: 7.5; 325 TABLET ORAL at 14:25

## 2019-10-06 RX ADMIN — CARVEDILOL 6.25 MG: 6.25 TABLET, FILM COATED ORAL at 14:25

## 2019-10-06 RX ADMIN — SODIUM BICARBONATE 650 MG: 650 TABLET ORAL at 07:54

## 2019-10-06 RX ADMIN — PREDNISONE 5 MG: 5 TABLET ORAL at 07:54

## 2019-10-06 RX ADMIN — Medication 10 ML: at 07:55

## 2019-10-06 RX ADMIN — LEVETIRACETAM 1500 MG: 100 SOLUTION ORAL at 14:26

## 2019-10-06 RX ADMIN — HYDROCODONE BITARTRATE AND ACETAMINOPHEN 1 TABLET: 7.5; 325 TABLET ORAL at 21:20

## 2019-10-06 RX ADMIN — ONDANSETRON 4 MG: 2 INJECTION INTRAMUSCULAR; INTRAVENOUS at 08:26

## 2019-10-06 RX ADMIN — Medication 10 ML: at 21:23

## 2019-10-06 RX ADMIN — PRIMIDONE 150 MG: 50 TABLET ORAL at 14:25

## 2019-10-06 RX ADMIN — TACROLIMUS 3 MG: 1 CAPSULE ORAL at 11:50

## 2019-10-06 RX ADMIN — TACROLIMUS 3 MG: 1 CAPSULE ORAL at 07:54

## 2019-10-06 ASSESSMENT — PAIN DESCRIPTION - PROGRESSION
CLINICAL_PROGRESSION: NOT CHANGED
CLINICAL_PROGRESSION: GRADUALLY WORSENING

## 2019-10-06 ASSESSMENT — PAIN DESCRIPTION - FREQUENCY
FREQUENCY: INTERMITTENT
FREQUENCY: CONTINUOUS

## 2019-10-06 ASSESSMENT — PAIN - FUNCTIONAL ASSESSMENT
PAIN_FUNCTIONAL_ASSESSMENT: PREVENTS OR INTERFERES SOME ACTIVE ACTIVITIES AND ADLS

## 2019-10-06 ASSESSMENT — PAIN DESCRIPTION - PAIN TYPE
TYPE: CHRONIC PAIN
TYPE: ACUTE PAIN

## 2019-10-06 ASSESSMENT — PAIN DESCRIPTION - LOCATION
LOCATION: HIP
LOCATION: NECK;PELVIS
LOCATION: PELVIS
LOCATION: NECK

## 2019-10-06 ASSESSMENT — PAIN DESCRIPTION - ONSET
ONSET: ON-GOING
ONSET: ON-GOING

## 2019-10-06 ASSESSMENT — PAIN SCALES - GENERAL
PAINLEVEL_OUTOF10: 9
PAINLEVEL_OUTOF10: 9
PAINLEVEL_OUTOF10: 0
PAINLEVEL_OUTOF10: 9

## 2019-10-06 ASSESSMENT — PAIN DESCRIPTION - ORIENTATION
ORIENTATION: RIGHT
ORIENTATION: LEFT
ORIENTATION: LEFT

## 2019-10-06 ASSESSMENT — PAIN DESCRIPTION - DESCRIPTORS
DESCRIPTORS: ACHING;CONSTANT
DESCRIPTORS: SHARP
DESCRIPTORS: ACHING;DISCOMFORT;SHARP

## 2019-10-07 LAB
HAV IGM SER IA-ACNC: NORMAL
HEPATITIS B CORE IGM ANTIBODY: NORMAL
HEPATITIS B SURFACE ANTIGEN INTERPRETATION: NORMAL
HEPATITIS C ANTIBODY INTERPRETATION: NORMAL

## 2019-10-07 PROCEDURE — 1200000000 HC SEMI PRIVATE

## 2019-10-07 PROCEDURE — 6360000002 HC RX W HCPCS: Performed by: INTERNAL MEDICINE

## 2019-10-07 PROCEDURE — 90935 HEMODIALYSIS ONE EVALUATION: CPT | Performed by: INTERNAL MEDICINE

## 2019-10-07 PROCEDURE — 6370000000 HC RX 637 (ALT 250 FOR IP): Performed by: EMERGENCY MEDICINE

## 2019-10-07 PROCEDURE — 2580000003 HC RX 258: Performed by: EMERGENCY MEDICINE

## 2019-10-07 PROCEDURE — 96372 THER/PROPH/DIAG INJ SC/IM: CPT

## 2019-10-07 PROCEDURE — 97535 SELF CARE MNGMENT TRAINING: CPT

## 2019-10-07 PROCEDURE — 97530 THERAPEUTIC ACTIVITIES: CPT

## 2019-10-07 PROCEDURE — 6370000000 HC RX 637 (ALT 250 FOR IP): Performed by: INTERNAL MEDICINE

## 2019-10-07 RX ADMIN — LACOSAMIDE 200 MG: 100 TABLET, FILM COATED ORAL at 06:27

## 2019-10-07 RX ADMIN — SODIUM BICARBONATE 650 MG: 650 TABLET ORAL at 14:20

## 2019-10-07 RX ADMIN — ACETAMINOPHEN 650 MG: 325 TABLET ORAL at 12:38

## 2019-10-07 RX ADMIN — TACROLIMUS 2 MG: 1 CAPSULE ORAL at 17:24

## 2019-10-07 RX ADMIN — Medication 10 ML: at 20:38

## 2019-10-07 RX ADMIN — TACROLIMUS 3 MG: 1 CAPSULE ORAL at 06:27

## 2019-10-07 RX ADMIN — CARVEDILOL 6.25 MG: 6.25 TABLET, FILM COATED ORAL at 14:20

## 2019-10-07 RX ADMIN — PRIMIDONE 150 MG: 50 TABLET ORAL at 12:52

## 2019-10-07 RX ADMIN — AMLODIPINE BESYLATE 10 MG: 10 TABLET ORAL at 12:38

## 2019-10-07 RX ADMIN — NEPHROCAP 1 MG: 1 CAP ORAL at 12:38

## 2019-10-07 RX ADMIN — PRIMIDONE 100 MG: 50 TABLET ORAL at 12:55

## 2019-10-07 RX ADMIN — LEVETIRACETAM 500 MG: 100 SOLUTION ORAL at 12:56

## 2019-10-07 RX ADMIN — LACOSAMIDE 250 MG: 100 TABLET, FILM COATED ORAL at 12:54

## 2019-10-07 RX ADMIN — PRIMIDONE 150 MG: 50 TABLET ORAL at 06:27

## 2019-10-07 RX ADMIN — LACOSAMIDE 100 MG: 100 TABLET, FILM COATED ORAL at 12:53

## 2019-10-07 RX ADMIN — LEVETIRACETAM 1000 MG: 100 SOLUTION ORAL at 06:28

## 2019-10-07 RX ADMIN — HEPARIN SODIUM 5000 UNITS: 10000 INJECTION, SOLUTION INTRAVENOUS; SUBCUTANEOUS at 20:39

## 2019-10-07 RX ADMIN — TACROLIMUS 3 MG: 1 CAPSULE ORAL at 12:37

## 2019-10-07 RX ADMIN — LEVETIRACETAM 1500 MG: 100 SOLUTION ORAL at 06:27

## 2019-10-07 RX ADMIN — PREDNISONE 5 MG: 5 TABLET ORAL at 12:38

## 2019-10-07 RX ADMIN — Medication 10 ML: at 12:36

## 2019-10-07 RX ADMIN — LEVETIRACETAM 1500 MG: 100 SOLUTION ORAL at 12:54

## 2019-10-07 RX ADMIN — CARVEDILOL 6.25 MG: 6.25 TABLET, FILM COATED ORAL at 20:38

## 2019-10-07 ASSESSMENT — PAIN DESCRIPTION - PAIN TYPE: TYPE: ACUTE PAIN

## 2019-10-07 ASSESSMENT — PAIN DESCRIPTION - LOCATION: LOCATION: HEAD

## 2019-10-07 ASSESSMENT — PAIN SCALES - GENERAL
PAINLEVEL_OUTOF10: 0
PAINLEVEL_OUTOF10: 8
PAINLEVEL_OUTOF10: 0

## 2019-10-08 VITALS
DIASTOLIC BLOOD PRESSURE: 66 MMHG | OXYGEN SATURATION: 96 % | SYSTOLIC BLOOD PRESSURE: 108 MMHG | HEART RATE: 76 BPM | TEMPERATURE: 98.8 F | RESPIRATION RATE: 18 BRPM | BODY MASS INDEX: 20.21 KG/M2 | HEIGHT: 61 IN | WEIGHT: 107.06 LBS

## 2019-10-08 LAB — HBV SURFACE AB TITR SER: NORMAL {TITER}

## 2019-10-08 PROCEDURE — 6370000000 HC RX 637 (ALT 250 FOR IP): Performed by: INTERNAL MEDICINE

## 2019-10-08 PROCEDURE — 6360000002 HC RX W HCPCS: Performed by: INTERNAL MEDICINE

## 2019-10-08 PROCEDURE — 96372 THER/PROPH/DIAG INJ SC/IM: CPT

## 2019-10-08 PROCEDURE — 97530 THERAPEUTIC ACTIVITIES: CPT

## 2019-10-08 PROCEDURE — 2580000003 HC RX 258: Performed by: EMERGENCY MEDICINE

## 2019-10-08 RX ORDER — CHOLECALCIFEROL (VITAMIN D3) 10 MCG
1 TABLET ORAL
Qty: 30 CAPSULE | Refills: 3 | DISCHARGE
Start: 2019-10-09 | End: 2020-02-25

## 2019-10-08 RX ADMIN — PRIMIDONE 150 MG: 50 TABLET ORAL at 06:54

## 2019-10-08 RX ADMIN — SODIUM BICARBONATE 650 MG: 650 TABLET ORAL at 08:45

## 2019-10-08 RX ADMIN — Medication 10 ML: at 08:49

## 2019-10-08 RX ADMIN — PRIMIDONE 150 MG: 50 TABLET ORAL at 13:32

## 2019-10-08 RX ADMIN — TACROLIMUS 3 MG: 1 CAPSULE ORAL at 06:54

## 2019-10-08 RX ADMIN — PREDNISONE 5 MG: 5 TABLET ORAL at 08:45

## 2019-10-08 RX ADMIN — AMLODIPINE BESYLATE 10 MG: 10 TABLET ORAL at 08:45

## 2019-10-08 RX ADMIN — SODIUM BICARBONATE 650 MG: 650 TABLET ORAL at 13:32

## 2019-10-08 RX ADMIN — HEPARIN SODIUM 5000 UNITS: 10000 INJECTION, SOLUTION INTRAVENOUS; SUBCUTANEOUS at 08:45

## 2019-10-08 RX ADMIN — CARVEDILOL 6.25 MG: 6.25 TABLET, FILM COATED ORAL at 08:45

## 2019-10-08 RX ADMIN — NEPHROCAP 1 MG: 1 CAP ORAL at 08:45

## 2019-10-08 RX ADMIN — CARVEDILOL 6.25 MG: 6.25 TABLET, FILM COATED ORAL at 13:32

## 2019-10-08 RX ADMIN — TACROLIMUS 3 MG: 1 CAPSULE ORAL at 11:36

## 2019-10-08 RX ADMIN — LACOSAMIDE 50 MG: 50 TABLET, FILM COATED ORAL at 13:32

## 2019-10-08 RX ADMIN — LEVETIRACETAM 1500 MG: 100 SOLUTION ORAL at 06:46

## 2019-10-08 RX ADMIN — LACOSAMIDE 200 MG: 100 TABLET, FILM COATED ORAL at 06:54

## 2019-10-08 RX ADMIN — LEVETIRACETAM 1500 MG: 100 SOLUTION ORAL at 13:33

## 2019-10-08 RX ADMIN — HYDROCODONE BITARTRATE AND ACETAMINOPHEN 1 TABLET: 7.5; 325 TABLET ORAL at 11:36

## 2019-10-08 ASSESSMENT — PAIN SCALES - GENERAL
PAINLEVEL_OUTOF10: 0
PAINLEVEL_OUTOF10: 8

## 2019-12-09 ENCOUNTER — HOSPITAL ENCOUNTER (EMERGENCY)
Age: 64
Discharge: HOME | End: 2019-12-09
Payer: COMMERCIAL

## 2019-12-09 VITALS
TEMPERATURE: 98 F | DIASTOLIC BLOOD PRESSURE: 89 MMHG | SYSTOLIC BLOOD PRESSURE: 145 MMHG | RESPIRATION RATE: 16 BRPM | WEIGHT: 105 LBS | OXYGEN SATURATION: 93 % | HEART RATE: 90 BPM | BODY MASS INDEX: 19.84 KG/M2

## 2019-12-09 LAB
ALBUMIN SERPL-MCNC: 4.7 G/DL (ref 3.5–5.2)
ALP BLD-CCNC: 159 U/L (ref 35–104)
ALT SERPL-CCNC: 9 U/L (ref 0–32)
ANION GAP SERPL CALCULATED.3IONS-SCNC: 16 MMOL/L (ref 7–16)
AST SERPL-CCNC: 22 U/L (ref 0–31)
BILIRUB SERPL-MCNC: 0.5 MG/DL (ref 0–1.2)
BUN BLDV-MCNC: 9 MG/DL (ref 8–23)
CALCIUM SERPL-MCNC: 9.2 MG/DL (ref 8.6–10.2)
CHLORIDE BLD-SCNC: 97 MMOL/L (ref 98–107)
CO2: 28 MMOL/L (ref 22–29)
CREAT SERPL-MCNC: 2 MG/DL (ref 0.5–1)
GFR AFRICAN AMERICAN: 30
GFR NON-AFRICAN AMERICAN: 25 ML/MIN/1.73
GLUCOSE BLD-MCNC: 110 MG/DL (ref 74–99)
HCT VFR BLD CALC: 38.2 % (ref 34–48)
HEMOGLOBIN: 12.1 G/DL (ref 11.5–15.5)
MCH RBC QN AUTO: 36.1 PG (ref 26–35)
MCHC RBC AUTO-ENTMCNC: 31.7 % (ref 32–34.5)
MCV RBC AUTO: 114 FL (ref 80–99.9)
PDW BLD-RTO: 14.8 FL (ref 11.5–15)
PLATELET # BLD: 168 E9/L (ref 130–450)
PMV BLD AUTO: 9.3 FL (ref 7–12)
POTASSIUM SERPL-SCNC: 3.1 MMOL/L (ref 3.5–5)
RBC # BLD: 3.35 E12/L (ref 3.5–5.5)
SODIUM BLD-SCNC: 141 MMOL/L (ref 132–146)
TOTAL PROTEIN: 7.8 G/DL (ref 6.4–8.3)
WBC # BLD: 3.7 E9/L (ref 4.5–11.5)

## 2019-12-09 PROCEDURE — 80177 DRUG SCRN QUAN LEVETIRACETAM: CPT

## 2019-12-09 PROCEDURE — 4500000002 HC ER NO CHARGE

## 2019-12-09 PROCEDURE — 85027 COMPLETE CBC AUTOMATED: CPT

## 2019-12-09 PROCEDURE — 80053 COMPREHEN METABOLIC PANEL: CPT

## 2019-12-11 LAB — KEPPRA: 130 UG/ML (ref 12–46)

## 2020-01-22 ENCOUNTER — HOSPITAL ENCOUNTER (EMERGENCY)
Age: 65
Discharge: HOME | End: 2020-01-22
Payer: COMMERCIAL

## 2020-01-22 ENCOUNTER — APPOINTMENT (OUTPATIENT)
Dept: GENERAL RADIOLOGY | Age: 65
End: 2020-01-22
Payer: COMMERCIAL

## 2020-01-22 VITALS
RESPIRATION RATE: 14 BRPM | TEMPERATURE: 98.1 F | WEIGHT: 107 LBS | BODY MASS INDEX: 21.01 KG/M2 | DIASTOLIC BLOOD PRESSURE: 70 MMHG | HEIGHT: 60 IN | HEART RATE: 84 BPM | OXYGEN SATURATION: 97 % | SYSTOLIC BLOOD PRESSURE: 119 MMHG

## 2020-01-22 PROCEDURE — 29125 APPL SHORT ARM SPLINT STATIC: CPT

## 2020-01-22 PROCEDURE — 73130 X-RAY EXAM OF HAND: CPT

## 2020-01-22 PROCEDURE — 73110 X-RAY EXAM OF WRIST: CPT

## 2020-01-22 PROCEDURE — 6370000000 HC RX 637 (ALT 250 FOR IP): Performed by: PHYSICIAN ASSISTANT

## 2020-01-22 PROCEDURE — 99283 EMERGENCY DEPT VISIT LOW MDM: CPT

## 2020-01-22 RX ORDER — HYDROCODONE BITARTRATE AND ACETAMINOPHEN 5; 325 MG/1; MG/1
1 TABLET ORAL EVERY 4 HOURS PRN
Qty: 18 TABLET | Refills: 0 | Status: SHIPPED | OUTPATIENT
Start: 2020-01-22 | End: 2020-01-25

## 2020-01-22 RX ORDER — HYDROCODONE BITARTRATE AND ACETAMINOPHEN 5; 325 MG/1; MG/1
1 TABLET ORAL ONCE
Status: COMPLETED | OUTPATIENT
Start: 2020-01-22 | End: 2020-01-22

## 2020-01-22 RX ADMIN — HYDROCODONE BITARTRATE AND ACETAMINOPHEN 1 TABLET: 5; 325 TABLET ORAL at 10:06

## 2020-01-22 ASSESSMENT — PAIN DESCRIPTION - FREQUENCY: FREQUENCY: CONTINUOUS

## 2020-01-22 ASSESSMENT — PAIN DESCRIPTION - PROGRESSION: CLINICAL_PROGRESSION: GRADUALLY WORSENING

## 2020-01-22 ASSESSMENT — PAIN SCALES - GENERAL
PAINLEVEL_OUTOF10: 10
PAINLEVEL_OUTOF10: 10

## 2020-01-22 ASSESSMENT — PAIN DESCRIPTION - DESCRIPTORS: DESCRIPTORS: ACHING;CONSTANT

## 2020-01-22 ASSESSMENT — PAIN DESCRIPTION - PAIN TYPE: TYPE: ACUTE PAIN

## 2020-01-22 ASSESSMENT — PAIN DESCRIPTION - LOCATION: LOCATION: HAND

## 2020-01-22 ASSESSMENT — PAIN DESCRIPTION - ORIENTATION: ORIENTATION: RIGHT

## 2020-01-22 ASSESSMENT — PAIN DESCRIPTION - ONSET: ONSET: SUDDEN

## 2020-01-22 NOTE — ED PROVIDER NOTES
Normal.    Constitutional:  Alert, development consistent with age. Neck:  Normal ROM. Supple. Non-tender. Hand: Right dorsal 3rd, 4th mid aspect . Tenderness: severe. Swelling: Severe. Deformity: no.               Skin:  no erythema, rash or wounds noted. Neurovascular: Motor deficit: none. Sensory deficit:   none. Pulse deficit: none. Capillary refill: normal.  Fingers:  all            Tenderness:  none. Swelling: None. Deformity: no.              ROM: full range of motion. Skin:  no erythema, rash or wounds noted. Wrist:               Tenderness:  none. Swelling: None. Deformity: no.             ROM: full range of motion. Skin:  no erythema, rash or wounds noted. Lymphatics: No lymphangitis or adenopathy noted. Neurological:  Oriented. Motor functions intact. t. Lab / Imaging Results   (All laboratory and radiology results have been personally reviewed by myself)  Labs:  No results found for this visit on 01/22/20. Imaging: All Radiology results interpreted by Radiologist unless otherwise noted. XR WRIST RIGHT (MIN 3 VIEWS)   Final Result      1. Acute fractures right third and fourth metacarpal shafts. 2. Findings consistent with old healed fracture distal radius and old   nonunited ulnar styloid fracture               XR HAND RIGHT (MIN 3 VIEWS)   Final Result      1. Acute spiral fractures right third and fourth metacarpal shafts   without significant displacement. 2. Osteopenia. 3. 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                  ED Course / Medical Decision Making     Medications   HYDROcodone-acetaminophen (NORCO) 5-325 MG per tablet 1 tablet (1 tablet Oral Given 1/22/20 1006)      Re-examination:  1/22/20       Time: 1000   Patient symptoms have improved after tablet Take 1 tablet by mouth every 4 hours as needed for Pain for up to 3 days. Intended supply: 3 days. Take lowest dose possible to manage pain, Disp-18 tablet, R-0Print           Electronically signed by Yordan Cormier PA-C   DD: 1/22/20  **This report was transcribed using voice recognition software. Every effort was made to ensure accuracy; however, inadvertent computerized transcription errors may be present.   END OF ED PROVIDER NOTE        Yordan Cormier PA-C  01/22/20 101

## 2020-01-22 NOTE — ED NOTES
Discharge instructions given. Patient verbalizes understanding. No other noted or stated problems at this time. Patient will follow up with orthopedic.       Amy Nielsen RN  01/22/20 6908

## 2020-01-28 ENCOUNTER — TELEPHONE (OUTPATIENT)
Dept: ORTHOPEDIC SURGERY | Age: 65
End: 2020-01-28

## 2020-02-04 ENCOUNTER — HOSPITAL ENCOUNTER (OUTPATIENT)
Dept: GENERAL RADIOLOGY | Age: 65
Discharge: HOME | End: 2020-02-06
Payer: COMMERCIAL

## 2020-02-04 ENCOUNTER — OFFICE VISIT (OUTPATIENT)
Dept: ORTHOPEDIC SURGERY | Age: 65
End: 2020-02-04
Payer: COMMERCIAL

## 2020-02-04 VITALS — HEIGHT: 60 IN | BODY MASS INDEX: 20.62 KG/M2 | WEIGHT: 105 LBS

## 2020-02-04 PROCEDURE — 73130 X-RAY EXAM OF HAND: CPT

## 2020-02-04 PROCEDURE — 99212 OFFICE O/P EST SF 10 MIN: CPT | Performed by: ORTHOPAEDIC SURGERY

## 2020-02-04 PROCEDURE — 99204 OFFICE O/P NEW MOD 45 MIN: CPT | Performed by: NURSE PRACTITIONER

## 2020-02-04 PROCEDURE — 29125 APPL SHORT ARM SPLINT STATIC: CPT | Performed by: ORTHOPAEDIC SURGERY

## 2020-02-04 PROCEDURE — 3017F COLORECTAL CA SCREEN DOC REV: CPT | Performed by: NURSE PRACTITIONER

## 2020-02-04 PROCEDURE — G8484 FLU IMMUNIZE NO ADMIN: HCPCS | Performed by: NURSE PRACTITIONER

## 2020-02-04 PROCEDURE — G8420 CALC BMI NORM PARAMETERS: HCPCS | Performed by: NURSE PRACTITIONER

## 2020-02-04 PROCEDURE — 1036F TOBACCO NON-USER: CPT | Performed by: NURSE PRACTITIONER

## 2020-02-04 PROCEDURE — G8427 DOCREV CUR MEDS BY ELIG CLIN: HCPCS | Performed by: NURSE PRACTITIONER

## 2020-02-04 RX ORDER — HYDROCODONE BITARTRATE AND ACETAMINOPHEN 5; 325 MG/1; MG/1
1 TABLET ORAL EVERY 8 HOURS PRN
Qty: 21 TABLET | Refills: 0 | Status: SHIPPED | OUTPATIENT
Start: 2020-02-04 | End: 2020-02-11

## 2020-02-08 NOTE — PROGRESS NOTES
Department of Orthopedic Surgery  New Patient Note      CHIEF COMPLAINT:   Chief Complaint   Patient presents with    Fracture     pt here for closed nondisplaced fx 3rd metacarpal bone right hand. xrays completed. HISTORY OF PRESENT ILLNESS:                The patient is a 60 y.o. female who presents today with complaints of right hand pain. She was seen and evaluated in the emergency room on 1-22-20 after a fall at home. Patient is right-hand dominant. She was found to have fractures of the right third and fourth metacarpal shafts. She also has an old healed fracture of the distal radius and ulnar styloid nonunion. Patient is currently undergoing dialysis. She has her fistula over her right wrist and forearm. She has a significant amount of swelling to the right hand and fingers. She is accompanied by her  today. She denies any other orthopedic problems or concerns at this time.     Past Medical History:        Diagnosis Date    Acute on chronic respiratory failure with hypoxia ([address] 4/7/2017    Anemia     Cardiomegaly     CHF (congestive heart failure) (HCC)     Chronic kidney disease     Constipation     Diaphragmatic hernia     Diverticulitis     Epilepsy ([address]     Falls     GERD (gastroesophageal reflux disease)     [facility] ([facility]     Hemiplegia ([facility])     Hemodialysis patient ([address]     MON - WED- FRI    History of blood transfusion     History of lung transplant ([address]     Hyperparathyroidism ([address]     Hypertension     Immunosuppressed status ([address] 4/7/2017    Insomnia     IPF (idiopathic pulmonary fibrosis) (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)      Past Surgical History:        Procedure Laterality Date    AV FISTULA REPAIR Right 08/17/2017    BACK SURGERY      CHOLECYSTECTOMY      COLONOSCOPY      DIALYSIS FISTULA CREATION Right 04/27/2017    right arm AV fistula/Dr. LAN    FIXATION KYPHOPLASTY  11/3/2015    kyphoplasty L5    FRACTURE SURGERY Left 12/2014    fracture    HERNIA REPAIR      HIP SURGERY      left    JOINT REPLACEMENT Bilateral     HIP    JOINT REPLACEMENT Left     knee    KNEE SURGERY      left    LEG SURGERY Left 12/06/2016    ORIF left femoral shaft and suprachondylr femur    [facility]    OTHER SURGICAL HISTORY  11/14/2017    RUE fistula revision    DC ANASTOMOSIS,AV,ANY SITE Right 9/28/2018    AV FISTULA  REVISION -- RIGHT ARM performed by Cyn Henderson MD at [address] [place] [address]     Current Medications:   No current facility-administered medications for this visit. Allergies:  Ambien [zolpidem tartrate]; Vassie Carl; Lorazepam; and Eszopiclone    Social History:   TOBACCO:   reports that she has never smoked. She has never used smokeless tobacco.  ETOH:   reports no history of alcohol use. DRUGS:   reports no history of drug use. ACTIVITIES OF DAILY LIVING:    OCCUPATION:    Family History:       Family history unknown: Yes       Review of Systems   Constitutional: Negative for fever, chills, diaphoresis, appetite change and fatigue. HENT: Negative for dental issues, hearing loss and tinnitus. Negative for congestion, sinus pressure, sneezing, sore throat. Negative for headache. Eyes: Negative for visual disturbance, blurred and double vision. Negative for pain, discharge, redness and itching  Respiratory: Negative for cough, shortness of breath and wheezing. Cardiovascular: Negative for chest pain, palpitations and leg swelling. No dyspnea on exertion   Gastrointestinal:   Negative for nausea, vomiting, abdominal pain, diarrhea, constipation  or black or bloody. Hematologic\Lymphatic:  negative for bleeding, petechiae,   Genitourinary: Negative for hematuria and difficulty urinating.    Musculoskeletal:

## 2020-02-25 ENCOUNTER — HOSPITAL ENCOUNTER (OUTPATIENT)
Dept: GENERAL RADIOLOGY | Age: 65
Discharge: HOME | End: 2020-02-27
Payer: COMMERCIAL

## 2020-02-25 ENCOUNTER — OFFICE VISIT (OUTPATIENT)
Dept: ORTHOPEDIC SURGERY | Age: 65
End: 2020-02-25
Payer: COMMERCIAL

## 2020-02-25 VITALS
DIASTOLIC BLOOD PRESSURE: 70 MMHG | WEIGHT: 103 LBS | HEIGHT: 61 IN | BODY MASS INDEX: 19.45 KG/M2 | SYSTOLIC BLOOD PRESSURE: 109 MMHG | HEART RATE: 74 BPM

## 2020-02-25 PROCEDURE — 73130 X-RAY EXAM OF HAND: CPT

## 2020-02-25 PROCEDURE — G8427 DOCREV CUR MEDS BY ELIG CLIN: HCPCS | Performed by: PHYSICIAN ASSISTANT

## 2020-02-25 PROCEDURE — 99212 OFFICE O/P EST SF 10 MIN: CPT | Performed by: PHYSICIAN ASSISTANT

## 2020-02-25 PROCEDURE — 3017F COLORECTAL CA SCREEN DOC REV: CPT | Performed by: PHYSICIAN ASSISTANT

## 2020-02-25 PROCEDURE — L3809 WHFO W/O JOINTS PRE OTS: HCPCS | Performed by: PHYSICIAN ASSISTANT

## 2020-02-25 PROCEDURE — G8484 FLU IMMUNIZE NO ADMIN: HCPCS | Performed by: PHYSICIAN ASSISTANT

## 2020-02-25 PROCEDURE — G8420 CALC BMI NORM PARAMETERS: HCPCS | Performed by: PHYSICIAN ASSISTANT

## 2020-02-25 PROCEDURE — 1036F TOBACCO NON-USER: CPT | Performed by: PHYSICIAN ASSISTANT

## 2020-02-25 PROCEDURE — 99213 OFFICE O/P EST LOW 20 MIN: CPT | Performed by: PHYSICIAN ASSISTANT

## 2020-02-26 NOTE — PROGRESS NOTES
Chief Complaint   Patient presents with    Fracture      Follow up  fx.  third metacarpal  1-. Rojelio Roa Denies pain, exhibits FROM. DOI: 1/22/20 right third and fourth metacarpal fractures    SUBJECTIVE: Arian Cummins is a 63 y.o. female presents to the office today for follow-up on the above fractures. She is right-hand dominant. Patient is now 4  Weeks from [address] [place] [address] 75. Patient has been in a removable orthoglass splint due the position of her fistula. Patient no longer on dialysis, she is s/p kidney transplant. Patient states her swelling has significant improved. She is using the right hand when using her Rollator walker. She has no pain in the hand. Denies any numbness or tingling. Patient states her hand does feel mildly stiff. Patient presents with her  today for follow up. Review of Systems -   General ROS: negative for - chills, fatigue, fever or night sweats  Respiratory ROS: no cough, shortness of breath, or wheezing  Cardiovascular ROS: no chest pain or dyspnea on exertion  Gastrointestinal ROS: no abdominal pain, change in bowel habits, or black or bloody stools  Genitourinary: no hematuria, dysuria, or incontinence   Musculoskeletal ROS:see above  Neurological ROS: no TIA or stroke symptoms       OBJECTIVE:   Alert and oriented X 3, no acute distress, respirations easy and unlabored with no audible wheezes, skin warm and dry, speech and dress appropriate for noted age, affect euthymic.     Extremity:  Right Upper Extremity  Skin is clean dry and intact  Mild edema noted to the dorsum of the hand  No evidence of skin breakdown  Mild TTP at the midshaft of the 3rd metacarpal ray, no TTP to the 4th metacarpal  Radial pulse palpable, fingers warm with BCR  Flex/extension intact to wrist, thumb and fingers  Finger opposition intact  Finger adduction/abduction intact  Finger crossover intact  Subjectively states sensation intact to radial/medial/ulnar distribution      XR: 2/26/20 3 views of the right hand demonstrate stable alignment of third and fourth oblique midshaft metacarpal fractures. Interval callus formation noted to the third metacarpal.  No overall change in alignment. No other acute osseous abnormality identified    /70   Pulse 74   Ht 5' 1\" (1.549 m)   Wt 103 lb (46.7 kg)   BMI 19.46 kg/m²     ASSESSMENT:     Diagnosis Orders   1. Closed nondisplaced fracture of third metacarpal bone of right hand with routine healing, unspecified portion of metacarpal, subsequent encounter         PLAN:  Patient fit with a velcro cock up wrist brace more for preventing the patient from fully weightbearing through the right hand with her walker. This is loose fitting at the area of her distal forearm. She is advised only to wear it when she is up and ambulating. She advised on concerning signs and symptoms that would warrant urgent follow-up, she is also instructed that if this feels too tight she is to remove it. Patient to continue with ice and elevation  Continue with over-the-counter analgesics as needed  Patient follow-up in 4 weeks with repeat x-rays, anticipate advancing weightbearing at that time. Electronically signed by Felix Layton PA-C on 2/26/2020 at 8:39 AM  Note: This report was completed using [employer] voiced recognition software. Every effort has been made to ensure accuracy; however, inadvertent computerized transcription errors may be present.

## 2020-03-19 ENCOUNTER — TELEPHONE (OUTPATIENT)
Dept: ORTHOPEDIC SURGERY | Age: 65
End: 2020-03-19

## 2020-03-19 NOTE — TELEPHONE ENCOUNTER
Called patient to reschedule appointment for  03/31/2020 due to COVID-19 virus. Will call back to reschedule appointment when restrictions are lifted.

## 2020-06-10 ENCOUNTER — APPOINTMENT (OUTPATIENT)
Dept: GENERAL RADIOLOGY | Age: 65
End: 2020-06-10
Payer: COMMERCIAL

## 2020-06-10 ENCOUNTER — APPOINTMENT (OUTPATIENT)
Dept: CT IMAGING | Age: 65
End: 2020-06-10
Payer: COMMERCIAL

## 2020-06-10 ENCOUNTER — HOSPITAL ENCOUNTER (EMERGENCY)
Age: 65
Discharge: HOME | End: 2020-06-10
Attending: EMERGENCY MEDICINE
Payer: COMMERCIAL

## 2020-06-10 VITALS
HEIGHT: 65 IN | BODY MASS INDEX: 17.49 KG/M2 | TEMPERATURE: 97.9 F | SYSTOLIC BLOOD PRESSURE: 119 MMHG | RESPIRATION RATE: 14 BRPM | OXYGEN SATURATION: 97 % | WEIGHT: 105 LBS | HEART RATE: 75 BPM | DIASTOLIC BLOOD PRESSURE: 58 MMHG

## 2020-06-10 PROCEDURE — 90471 IMMUNIZATION ADMIN: CPT | Performed by: NURSE PRACTITIONER

## 2020-06-10 PROCEDURE — 73562 X-RAY EXAM OF KNEE 3: CPT

## 2020-06-10 PROCEDURE — 72192 CT PELVIS W/O DYE: CPT

## 2020-06-10 PROCEDURE — 90715 TDAP VACCINE 7 YRS/> IM: CPT | Performed by: NURSE PRACTITIONER

## 2020-06-10 PROCEDURE — 99282 EMERGENCY DEPT VISIT SF MDM: CPT

## 2020-06-10 PROCEDURE — 6360000002 HC RX W HCPCS: Performed by: NURSE PRACTITIONER

## 2020-06-10 RX ADMIN — TETANUS TOXOID, REDUCED DIPHTHERIA TOXOID AND ACELLULAR PERTUSSIS VACCINE, ADSORBED 0.5 ML: 5; 2.5; 8; 8; 2.5 SUSPENSION INTRAMUSCULAR at 16:59

## 2020-06-10 ASSESSMENT — PAIN DESCRIPTION - PAIN TYPE: TYPE: ACUTE PAIN

## 2020-06-10 ASSESSMENT — PAIN DESCRIPTION - ONSET: ONSET: GRADUAL

## 2020-06-10 ASSESSMENT — PAIN DESCRIPTION - LOCATION: LOCATION: HIP;KNEE

## 2020-06-10 ASSESSMENT — PAIN DESCRIPTION - PROGRESSION: CLINICAL_PROGRESSION: GRADUALLY WORSENING

## 2020-06-10 ASSESSMENT — PAIN SCALES - GENERAL: PAINLEVEL_OUTOF10: 5

## 2020-06-10 ASSESSMENT — PAIN DESCRIPTION - DESCRIPTORS: DESCRIPTORS: ACHING

## 2020-06-10 ASSESSMENT — PAIN DESCRIPTION - FREQUENCY: FREQUENCY: CONTINUOUS

## 2020-06-10 NOTE — ED PROVIDER NOTES
ED Attending  CC: Mady         Department of Emergency Medicine   ED  Provider Note  Admit Date/RoomTime: 6/10/2020  3:22 PM  ED Room: 29/29    Chief Complaint   Fall ( mechanical fall x two. Hip Pain, knee pain. Denies LOC denies hitting head)    History of Present Illness   Source of history provided by:  patient. History/Exam Limitations: none. Ji Pham is a 63 y.o. old female who has a past medical history of:   Past Medical History:   Diagnosis Date    Acute on chronic respiratory failure with hypoxia ([address] 4/7/2017    Anemia     Cardiomegaly     [facility]     Chronic kidney disease     Constipation     Diaphragmatic hernia     Diverticulitis     Epilepsy ([address]     Falls     GERD (gastroesophageal reflux disease)     [facility]     Hemiplegia ([address]     Hemodialysis patient ([address]     MON - WED- FRI    History of blood transfusion     History of lung transplant ([address]     Hyperparathyroidism ([address]     Hypertension     Immunosuppressed status ([address] 4/7/2017    Insomnia     IPF [facility]idiopathic pulmonary fibrosis) ([facility]     Kidney stone     Neutropenia ([facility]     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 She was reportedly walking while at home prior to incident with complaints of bilateraln hip and knee pain. The patients tetanus status is up to date. Since onset the symptoms have been minimal in degree. Her pain is aggraveated by movement, use and palpation and relieved by rest.  She denies any head injury, loss of consciousness, neck pain, chest pain, abdominal pain, numbness or weakness.   Patient states both time she

## 2020-06-11 ENCOUNTER — CARE COORDINATION (OUTPATIENT)
Dept: CASE MANAGEMENT | Age: 65
End: 2020-06-11

## 2020-06-11 NOTE — CARE COORDINATION
Date/Time:  6/11/2020 10:46 AM  1st Attempt to reach patient by telephone for Covid Call. Left HIPPA compliant message requesting a return call. Will attempt to reach patient again.     Rosenda Choe LPN     [facility] / [facility]

## 2020-06-12 ENCOUNTER — CARE COORDINATION (OUTPATIENT)
Dept: CARE COORDINATION | Age: 65
End: 2020-06-12

## 2020-06-19 ENCOUNTER — CARE COORDINATION (OUTPATIENT)
Dept: CASE MANAGEMENT | Age: 65
End: 2020-06-19

## 2020-06-19 NOTE — CARE COORDINATION
underlying condition or if you are sick. For more information on steps you can take to protect yourself, see CDC's How to Protect Yourself      Final Call - No Covid Symptoms based on severity of symptoms and risk factors.     Adrian Armenta LPN     [address] [facility] / [facility]

## 2021-07-13 ENCOUNTER — APPOINTMENT (OUTPATIENT)
Dept: CT IMAGING | Age: 66
End: 2021-07-13
Payer: COMMERCIAL

## 2021-07-13 ENCOUNTER — APPOINTMENT (OUTPATIENT)
Dept: GENERAL RADIOLOGY | Age: 66
End: 2021-07-13
Payer: COMMERCIAL

## 2021-07-13 ENCOUNTER — HOSPITAL ENCOUNTER (EMERGENCY)
Age: 66
Discharge: HOME | End: 2021-07-13
Payer: COMMERCIAL

## 2021-07-13 VITALS
HEART RATE: 84 BPM | DIASTOLIC BLOOD PRESSURE: 70 MMHG | RESPIRATION RATE: 16 BRPM | HEIGHT: 61 IN | SYSTOLIC BLOOD PRESSURE: 137 MMHG | WEIGHT: 103 LBS | OXYGEN SATURATION: 99 % | TEMPERATURE: 97.8 F | BODY MASS INDEX: 19.45 KG/M2

## 2021-07-13 DIAGNOSIS — M25.559 HIP PAIN: ICD-10-CM

## 2021-07-13 DIAGNOSIS — S22.080A COMPRESSION FRACTURE OF T12 VERTEBRA, INITIAL ENCOUNTER (HCC): ICD-10-CM
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
## 2021-07-13 DIAGNOSIS — S32.010A CLOSED COMPRESSION FRACTURE OF L1 VERTEBRA, INITIAL ENCOUNTER (HCC): ICD-10-CM

## 2021-07-13 DIAGNOSIS — S32.020A CLOSED COMPRESSION FRACTURE OF L2 VERTEBRA, INITIAL ENCOUNTER (HCC): ICD-10-CM

## 2021-07-13 PROCEDURE — 72131 CT LUMBAR SPINE W/O DYE: CPT

## 2021-07-13 PROCEDURE — 6370000000 HC RX 637 (ALT 250 FOR IP): Performed by: NURSE PRACTITIONER

## 2021-07-13 PROCEDURE — 71250 CT THORAX DX C-: CPT

## 2021-07-13 PROCEDURE — 99284 EMERGENCY DEPT VISIT MOD MDM: CPT

## 2021-07-13 PROCEDURE — 73502 X-RAY EXAM HIP UNI 2-3 VIEWS: CPT

## 2021-07-13 RX ORDER — HYDROCODONE BITARTRATE AND ACETAMINOPHEN 5; 325 MG/1; MG/1
1 TABLET ORAL ONCE
Status: COMPLETED | OUTPATIENT
Start: 2021-07-13 | End: 2021-07-13

## 2021-07-13 RX ORDER — HYDROCODONE BITARTRATE AND ACETAMINOPHEN 5; 325 MG/1; MG/1
1 TABLET ORAL EVERY 8 HOURS PRN
Qty: 10 TABLET | Refills: 0 | Status: SHIPPED | OUTPATIENT
Start: 2021-07-13 | End: 2021-07-16

## 2021-07-13 RX ADMIN — HYDROCODONE BITARTRATE AND ACETAMINOPHEN 1 TABLET: 5; 325 TABLET ORAL at 14:24

## 2021-07-13 RX ADMIN — HYDROCODONE BITARTRATE AND ACETAMINOPHEN 1 TABLET: 5; 325 TABLET ORAL at 16:28

## 2021-07-13 ASSESSMENT — PAIN DESCRIPTION - ORIENTATION: ORIENTATION: RIGHT

## 2021-07-13 ASSESSMENT — PAIN DESCRIPTION - PAIN TYPE: TYPE: ACUTE PAIN

## 2021-07-13 ASSESSMENT — PAIN DESCRIPTION - LOCATION: LOCATION: HIP;RIB CAGE

## 2021-07-13 ASSESSMENT — PAIN SCALES - GENERAL
PAINLEVEL_OUTOF10: 8

## 2021-08-10 ENCOUNTER — APPOINTMENT (OUTPATIENT)
Dept: GENERAL RADIOLOGY | Age: 66
End: 2021-08-10
Payer: COMMERCIAL

## 2021-08-10 ENCOUNTER — HOSPITAL ENCOUNTER (EMERGENCY)
Age: 66
Discharge: HOME | End: 2021-08-10
Payer: COMMERCIAL

## 2021-08-10 VITALS
RESPIRATION RATE: 16 BRPM | HEART RATE: 75 BPM | OXYGEN SATURATION: 100 % | BODY MASS INDEX: 21.6 KG/M2 | HEIGHT: 60 IN | DIASTOLIC BLOOD PRESSURE: 76 MMHG | SYSTOLIC BLOOD PRESSURE: 146 MMHG | WEIGHT: 110 LBS | TEMPERATURE: 97 F

## 2021-08-10 DIAGNOSIS — S96.911A STRAIN OF RIGHT FOOT, INITIAL ENCOUNTER: Primary | ICD-10-CM

## 2021-08-10 PROCEDURE — 73630 X-RAY EXAM OF FOOT: CPT

## 2021-08-10 PROCEDURE — 6370000000 HC RX 637 (ALT 250 FOR IP): Performed by: NURSE PRACTITIONER

## 2021-08-10 PROCEDURE — 99284 EMERGENCY DEPT VISIT MOD MDM: CPT

## 2021-08-10 RX ORDER — ACETAMINOPHEN 325 MG/1
650 TABLET ORAL ONCE
Status: COMPLETED | OUTPATIENT
Start: 2021-08-10 | End: 2021-08-10

## 2021-08-10 RX ADMIN — ACETAMINOPHEN 650 MG: 325 TABLET ORAL at 11:49

## 2021-08-10 ASSESSMENT — PAIN SCALES - GENERAL: PAINLEVEL_OUTOF10: 8

## 2021-08-10 NOTE — ED PROVIDER NOTES
[facility]  Department of Emergency Medicine   ED  Encounter Note  Admit Date/RoomTime: 8/10/2021 11:38 AM  ED Room: SOCO/SOCO    NAME: Michelle Perkins  : 1955  MRN: [id number]     Chief Complaint:  Fall (fell going out the door, injured right foot) and Foot Injury    History of Present Illness         Michelle Perkins is a 61 y.o. old female presenting to the emergency department by private vehicle, for traumatic Right foot pain which occured 2 day(s) prior to arrival.  The complaint is due to was stepping out the door when the door hit her foot and made her fall. She denies any other injury or trauma. Patient has no prior history of pain/injury with regards to today's visit. Since onset the symptoms have been remaining constant with ability to bear weight, but with some pain. Her pain is aggraveated by certain movements or pressure on or palpation of painful area and relieved by nothing, as no treatment has been provided prior to this visit. She denies any head injury, headache, loss of consciousness, confusion, dizziness, neck pain, chest pain, abdominal pain, back pain, numbness, weakness, blurred vision, nausea or vomiting. Tetanus Status: within past 5 years. ROS   Pertinent positives and negatives are stated within HPI, all other systems reviewed and are negative.     Past Medical History:  has a past medical history of Acute on chronic respiratory failure with hypoxia ([address] Anemia, Cardiomegaly, CHF (congestive heart failure) ([address] Chronic kidney disease, Constipation, Diaphragmatic hernia, Diverticulitis, Epilepsy ([address] Falls, GERD (gastroesophageal reflux disease), Hemiparesis ([address] Hemiplegia ([address] Hemodialysis patient ([address] History of blood transfusion, History of lung transplant ([address] Hyperparathyroidism ([address] Hypertension, Immunosuppressed status ([address] Insomnia, IPF (idiopathic pulmonary fibrosis) ([address] Kidney stone, Neutropenia ([address] 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 Surgical History:  has a past surgical history that includes Lung transplant (2003); hip surgery; knee surgery; hernia repair; Cholecystectomy; Fixation Kyphoplasty (11/3/2015); Leg Surgery (Left, 12/06/2016); Dialysis fistula creation (Right, 04/27/2017); back surgery; AV fistula repair (Right, 08/17/2017); Colonoscopy; other surgical history (11/14/2017); fracture surgery (Left, 12/2014); joint replacement (Bilateral); joint replacement (Left); and pr anastomosis,av,any site (Right, 9/28/2018). Social History:  reports that she has never smoked. She has never used smokeless tobacco. She reports that she does not drink alcohol and does not use drugs. Family History: Family history is unknown by patient. Allergies: Ambien [zolpidem tartrate], Klonopin [clonazepam], Lorazepam, and Eszopiclone    Physical Exam   Oxygen Saturation Interpretation: Normal.        ED Triage Vitals   BP Temp Temp Source Pulse Resp SpO2 Height Weight   08/10/21 1145 08/10/21 1142 08/10/21 1142 08/10/21 1142 08/10/21 1142 08/10/21 1142 08/10/21 1142 08/10/21 1142   (!) 146/76 96.7 °F (35.9 °C) Oral 75 16 100 % 5' (1.524 m) 110 lb (49.9 kg)         Constitutional:  Alert, development consistent with age. Neck:  Normal ROM. Supple. Right Foot:                Tenderness:  moderate. Swelling: Mild. Deformity: no deformity observed/palpated. ROM: full range with pain. Skin:  no erythema, rash or wounds noted. Neurovascular: Motor deficit: none. Sensory deficit:   none. Pulse deficit: none. Capillary refill: normal.  Right Toe(s):  diffusely across all digits. Tenderness: none. Swelling: None.             Deformity: no deformity observed/palpated. ROM: full range with pain. Skin:  no wounds, erythema, or swelling. Right Ankle: diffusely across ankle            Tenderness:  none. Swelling: None. Deformity: no deformity observed/palpated. ROM: full range of motion. Skin:  no wounds, erythema, or swelling. Gait:  normal with use of required mobility aid. Lymphatics: No lymphangitis or adenopathy noted. Neurological:  Oriented. Motor functions intact. Lab / Imaging Results   (All laboratory and radiology results have been personally reviewed by myself)  Labs:  No results found for this visit on 08/10/21. Imaging: All Radiology results interpreted by Radiologist unless otherwise noted. XR FOOT RIGHT (MIN 3 VIEWS)   Final Result   No acute osseous abnormality is identified           ED Course / Medical Decision Making     Medications   acetaminophen (TYLENOL) tablet 650 mg (650 mg Oral Given 8/10/21 1149)        Consult(s):   none. Procedure(s):  None    MDM:      Imaging was obtained based on moderate suspicion for fracture / bony abnormality, dislocation as per history/physical findings. X-ray of the right foot reveals no acute osseous abnormalities identified. She has full strength. All compartments are compressible and soft. There is no neurovascular compromise neurological deficits. She denies any other injury or trauma. She has any head strike, loss conscious, neck pain, back pain, chest pain, shortness breath or abdominal pain. After shared decision-making she states she agrees to Ace wrap and postop shoe. She ambulates with a steady gait using her wheeled walker. Instructed patient to take Tylenol as directed for pain  Plan is subsequently for symptom control, limited use and  immobilization with appropriate outpatient follow-up.     Plan of Care/Counseling:  ESTRELLA Agrawal CNP reviewed today's visit with the patient in addition to providing specific details for the plan of care and counseling regarding the diagnosis and prognosis. Questions are answered at this time and are agreeable with the plan. Assessment      1. Strain of right foot, initial encounter      Plan   Discharged home. Patient condition is stable    New Medications     Discharge Medication List as of 8/10/2021 12:36 PM        Electronically signed by ESTRELLA Beatty CNP   DD: 8/10/21  **This report was transcribed using voice recognition software. Every effort was made to ensure accuracy; however, inadvertent computerized transcription errors may be present.   END OF ED PROVIDER NOTE        ESTRELLA Beatty CNP  08/10/21 [id number]

## 2021-10-05 ENCOUNTER — HOSPITAL ENCOUNTER (EMERGENCY)
Age: 66
Discharge: HOME | End: 2021-10-05
Payer: COMMERCIAL

## 2021-10-05 ENCOUNTER — APPOINTMENT (OUTPATIENT)
Dept: GENERAL RADIOLOGY | Age: 66
End: 2021-10-05
Payer: COMMERCIAL

## 2021-10-05 VITALS
OXYGEN SATURATION: 96 % | HEIGHT: 60 IN | BODY MASS INDEX: 21.6 KG/M2 | DIASTOLIC BLOOD PRESSURE: 72 MMHG | RESPIRATION RATE: 14 BRPM | TEMPERATURE: 97.6 F | SYSTOLIC BLOOD PRESSURE: 136 MMHG | WEIGHT: 110 LBS | HEART RATE: 73 BPM

## 2021-10-05 DIAGNOSIS — M25.562 ACUTE PAIN OF LEFT KNEE: Primary | ICD-10-CM

## 2021-10-05 PROCEDURE — 73560 X-RAY EXAM OF KNEE 1 OR 2: CPT

## 2021-10-05 PROCEDURE — 6370000000 HC RX 637 (ALT 250 FOR IP): Performed by: PHYSICIAN ASSISTANT

## 2021-10-05 PROCEDURE — 99283 EMERGENCY DEPT VISIT LOW MDM: CPT

## 2021-10-05 RX ORDER — ACETAMINOPHEN 500 MG
1000 TABLET ORAL ONCE
Status: COMPLETED | OUTPATIENT
Start: 2021-10-05 | End: 2021-10-05

## 2021-10-05 RX ORDER — HYDROCODONE BITARTRATE AND ACETAMINOPHEN 5; 325 MG/1; MG/1
1 TABLET ORAL EVERY 6 HOURS PRN
Qty: 12 TABLET | Refills: 0 | Status: SHIPPED | OUTPATIENT
Start: 2021-10-05 | End: 2021-10-08

## 2021-10-05 RX ADMIN — ACETAMINOPHEN 1000 MG: 500 TABLET ORAL at 10:25

## 2021-10-05 ASSESSMENT — PAIN DESCRIPTION - ORIENTATION: ORIENTATION: RIGHT;LEFT

## 2021-10-05 ASSESSMENT — PAIN DESCRIPTION - DESCRIPTORS: DESCRIPTORS: PATIENT UNABLE TO DESCRIBE

## 2021-10-05 ASSESSMENT — PAIN DESCRIPTION - PROGRESSION: CLINICAL_PROGRESSION: NOT CHANGED

## 2021-10-05 ASSESSMENT — PAIN SCALES - GENERAL
PAINLEVEL_OUTOF10: 9
PAINLEVEL_OUTOF10: 8

## 2021-10-05 ASSESSMENT — PAIN DESCRIPTION - PAIN TYPE: TYPE: ACUTE PAIN

## 2021-10-05 ASSESSMENT — PAIN DESCRIPTION - FREQUENCY: FREQUENCY: CONTINUOUS

## 2021-10-05 ASSESSMENT — PAIN DESCRIPTION - LOCATION: LOCATION: KNEE

## 2021-10-05 NOTE — ED PROVIDER NOTES
[facility]                                                                                                                                      Department of Emergency Medicine   ED  Provider Note  Admit Date/RoomTime: 10/5/2021  9:16 AM  ED Room: 35/35        HPI:  10/5/21,   Time: 9:54 AM EDT         Louisa Flores is a 61 y.o. female presenting to the ED for fall with left knee pain, beginning 1 day ago. The complaint has been persistent, moderate in severity, and worsened by walking. Patient states that she was going down the front steps and lost her balance. She fell landing on the left knee. The patient states that this knee was replaced by Dr. Rigoberto Hess many years ago. She is able to ambulate but states that it is very painful. Reports that the pain is aggravated with weightbearing. No swelling or redness. Denies LOC or head injury. Denies hip or back pain.           ROS:     Constitutional: Negative for fever and chills  HENT: Negative for ear pain, sore throat and sinus pressure  Eyes: Negative for pain, discharge and redness  Respiratory:  Negative for shortness of breath, cough and wheezing  Cardiovascular: Negative for CP, edema or palpitations  Gastrointestinal: Negative for nausea, vomiting, diarrhea and abdominal distention  Genitourinary: Negative for dysuria and frequency  Musculoskeletal:  See HPI  Skin: Negative for rash and wound  Neurological: Negative for weakness and headaches  Hematological: Negative for adenopathy    All other systems reviewed and are negative      -------------------------------- PAST HISTORY ----------------------------------  Past Medical History:  has a past medical history of Acute on chronic respiratory failure with hypoxia ([facility] Anemia, Cardiomegaly, CHF (congestive heart failure) ([facility] [address] Chronic kidney disease, Constipation, Diaphragmatic hernia, Diverticulitis, Epilepsy ([facility] [address] Falls, GERD (gastroesophageal reflux disease), Hemiparesis ([facility] [address] Hemiplegia [address] [address] Hemodialysis patient ([facility] [address] [facility] History of blood transfusion, History of lung transplant ([facility] [address] Hyperparathyroidism ([facility] [address] [facility] Hypertension, Immunosuppressed status ([facility] [address] Insomnia, IPF (idiopathic pulmonary fibrosis) ([facility] [address] Kidney stone, Neutropenia ([facility] [address] 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 Past Surgical History:  has a past surgical history that includes Lung transplant (2003); hip surgery; knee surgery; hernia repair; Cholecystectomy; Fixation Kyphoplasty (11/3/2015); Leg Surgery (Left, 12/06/2016); Dialysis fistula creation (Right, 04/27/2017); back surgery; AV fistula repair (Right, 08/17/2017); Colonoscopy; other surgical history (11/14/2017); fracture surgery (Left, 12/2014); joint replacement (Bilateral); joint replacement (Left); and pr anastomosis,av,any site (Right, 9/28/2018). Social History:  reports that she has never smoked. She has never used smokeless tobacco. She reports that she does not drink alcohol and does not use drugs. Family History: Family history is unknown by patient. The patients home medications have been reviewed. Allergies: Ambien [zolpidem tartrate], Klonopin [clonazepam], Lorazepam, and Eszopiclone    --------------------------------- RESULTS ------------------------------------------  All laboratory and radiology results have been personally reviewed by myself   LABS:  No results found for this visit on 10/05/21. RADIOLOGY:  Interpreted by Radiologist.  XR KNEE LEFT (1-2 VIEWS)   Final Result   The visualized fixation hardware at the femur and tibia is intact. Degenerative arthropathy at the knee.             ----------------- NURSING NOTES AND VITALS REVIEWED ---------------   The nursing notes within the ED encounter and vital signs as below have been reviewed.    /72   Pulse 73   Temp 97.6 °F (36.4 °C) (Temporal) Resp 14   Ht 5' (1.524 m)   Wt 110 lb (49.9 kg)   SpO2 96%   BMI 21.48 kg/m²   Oxygen Saturation Interpretation: Normal      --------------------------------PHYSICAL EXAM------------------------------------      Constitutional/General: Alert and oriented x3, well appearing, non toxic in NAD  Head: NC/AT  Eyes: PERRL, EOMI  Mouth: Oropharynx clear, handling secretions, no trismus  Neck: Supple, full ROM, no meningeal signs  Pulmonary: Lungs clear to auscultation bilaterally, no wheezes, rales, or rhonchi. Not in respiratory distress  Cardiovascular:  Regular rate and rhythm, no murmurs, gallops, or rubs. 2+ distal pulses  Abdomen: Soft, + BS. No distension. Nontender. No palpable rigidity, rebound or guarding  Extremities: Moves all extremities x 4. Midline scar over left knee. Intact and well healed. Small bruise noted laterally. Pt tender over lateral joint. ROM grossly intact. Calf is soft. Warm and well perfused  Skin: warm and dry without rash  Neurologic: GCS 15,  Intact. No focal deficits  Psych: Normal Affect      ------------------------ ED COURSE/MEDICAL DECISION MAKING----------------------  Medications   acetaminophen (TYLENOL) tablet 1,000 mg (1,000 mg Oral Given 10/5/21 1025)         Medical Decision Making:    No acute fracture or bony abnormality identified. No evidence of hardware failure that was seen on imaging. The patient was reassured. She already has a walker here. I do suggest that she follow-up with the orthopedic surgeon. She states that Dr. Corona Gimenez no longer takes her insurance. I gave her the contact information for the on-call Orthopedist.    Patient is aware and agreeable to this plan. I did give her a few meds for pain to use at home in the meantime. Counseling:    The emergency provider has spoken with the patient and discussed todays results, in addition to providing specific details for the plan of care and counseling regarding the diagnosis and prognosis. Questions are answered at this time and they are agreeable with the plan.      ------------------------ IMPRESSION AND DISPOSITION -------------------------------    IMPRESSION  1.  Acute pain of left knee        DISPOSITION  Disposition: Discharge to home  Patient condition is stable                   Barbie Magaña PA-C  10/05/21 [id number] yes

## 2021-10-07 ENCOUNTER — TELEPHONE (OUTPATIENT)
Dept: ORTHOPEDIC SURGERY | Age: 66
End: 2021-10-07

## 2021-10-19 ENCOUNTER — HOSPITAL ENCOUNTER (OUTPATIENT)
Dept: GENERAL RADIOLOGY | Age: 66
Discharge: HOME | End: 2021-10-21
Payer: COMMERCIAL

## 2021-10-19 ENCOUNTER — OFFICE VISIT (OUTPATIENT)
Dept: ORTHOPEDIC SURGERY | Age: 66
End: 2021-10-19
Payer: COMMERCIAL

## 2021-10-19 VITALS — TEMPERATURE: 97.1 F

## 2021-10-19 DIAGNOSIS — S83.412A TEAR OF MEDIAL COLLATERAL LIGAMENT OF LEFT KNEE, INITIAL ENCOUNTER: ICD-10-CM

## 2021-10-19 DIAGNOSIS — M79.605 PAIN OF LEFT LOWER EXTREMITY: ICD-10-CM

## 2021-10-19 DIAGNOSIS — M79.605 PAIN OF LEFT LOWER EXTREMITY: Primary | ICD-10-CM

## 2021-10-19 PROCEDURE — 99204 OFFICE O/P NEW MOD 45 MIN: CPT | Performed by: ORTHOPAEDIC SURGERY

## 2021-10-19 PROCEDURE — G8484 FLU IMMUNIZE NO ADMIN: HCPCS | Performed by: ORTHOPAEDIC SURGERY

## 2021-10-19 PROCEDURE — 73552 X-RAY EXAM OF FEMUR 2/>: CPT

## 2021-10-19 PROCEDURE — 99212 OFFICE O/P EST SF 10 MIN: CPT | Performed by: ORTHOPAEDIC SURGERY

## 2021-10-19 PROCEDURE — 3017F COLORECTAL CA SCREEN DOC REV: CPT | Performed by: ORTHOPAEDIC SURGERY

## 2021-10-19 PROCEDURE — 1036F TOBACCO NON-USER: CPT | Performed by: ORTHOPAEDIC SURGERY

## 2021-10-19 PROCEDURE — G8420 CALC BMI NORM PARAMETERS: HCPCS | Performed by: ORTHOPAEDIC SURGERY

## 2021-10-19 PROCEDURE — G8427 DOCREV CUR MEDS BY ELIG CLIN: HCPCS | Performed by: ORTHOPAEDIC SURGERY

## 2021-10-19 NOTE — PROGRESS NOTES
Esther Nichols is a 61 y.o. female who presents for evaluation of knees Left    Referred from an established patient with a new problem    Symptom onset: Date: 10/04/2021  Mechanism of Injury: fall    Previous Treatments: ice, acetaminophen- , which has been not very effective which have been not very effective    Patient working in retired    Weightbearing: left lower Full weight bearing    Assistive device Rolling walker  Participating in therapy?  no

## 2021-10-25 PROBLEM — S83.412A TEAR OF MEDIAL COLLATERAL LIGAMENT OF LEFT KNEE: Status: ACTIVE | Noted: 2021-10-25

## 2021-10-25 NOTE — PROGRESS NOTES
Nightly) 30 tablet 5    primidone (MYSOLINE) 50 MG tablet Take 3 tablets by mouth 2 times daily 180 tablet 11    amLODIPine (NORVASC) 10 MG tablet Take 1 tablet by mouth daily (Patient taking differently: Take 10 mg by mouth every morning Take morning of surgery with a sip of water) 30 tablet 0    tacrolimus (PROGRAF) 1 MG capsule Take 3 mg by mouth daily 3 mg AM and Lunch, 2 mg Nightly      predniSONE (DELTASONE) 5 MG tablet Take 5 mg by mouth every morning        No current facility-administered medications for this visit.      Allergies: Ambien [zolpidem tartrate], Klonopin [clonazepam], Lorazepam, and Eszopiclone  Past Medical History:   Diagnosis Date    Acute on chronic respiratory failure with hypoxia ([facility] [address] 4/7/2017    Anemia     Cardiomegaly     CH[facility]congest[facility] failure) ([facility])     Chronic kidney disease     Constipation     Diaphragmatic hernia     Diverticulitis     Epilepsy ([address]     Falls     GERD (gastroesophageal reflux disease)     Hemiparesis ([facility])     Hemiplegia ([facility]     Hemodialysis patient ([facility] [address] [facility]     MON - WED- FRI    History of blood transfusion     History of lung transplant ([address]     Hyperparathyroidism ([address]     Hypertension     Immunosuppressed status ([facility] [address] [facility] 4/7/2017    Insomnia     IPF (idiopathic pulmonary fibrosis) ([facility])     Kidney stone     Neutropenia ([facility])     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 LAN    FIXATION KYPHOPLASTY  11/3/2015    kyphoplasty L5    FRACTURE SURGERY Left 12/2014    fracture    HERNIA REPAIR      HIP SURGERY      left    JOINT REPLACEMENT Bilateral     HIP    States she is able to bear weight on it without significant pain or discomfort. Patient uses a walker for ambulation which she states is her baseline. Physical Exam  Temp 97.1 °F (36.2 °C)   O:   CONSTITUTIONAL: awake, alert, cooperative, no apparent distress, and appears stated age, frail appearing  EYES: Lids and lashes normal, pupils equal, round and reactive to light, extra ocular muscles intact, sclera clear, conjunctiva normal  ENT: Normocephalic, without obvious abnormality, atraumatic, external ears without lesions, oral pharynx with moist mucus membranes  NECK: Trachea midline, skin normal  LUNGS: No increased work of breathing, good air exchange  CARDIOVASCULAR: 2+ pulses throughout and capillary Refill less than 2 seconds  ABDOMEN: soft, non-distended, non-tender and no rebound tenderness or guarding  NEUROLOGIC: Awake, alert, oriented to name, place and time. Cranial nerves II-XII are grossly intact. Motor is 5 out of 5 bilaterally. Sensory is intact. Left Lower Extremity Exam:  Skin intact, there is no significant effusion, there is no true distal femur or proximal tibia bone pain to palpation, there is prominent hardware lateral distal femur from retained hardware which is mildly symptomatic, there is some tenderness over the medial knee and medial joint line  Knee unstable to valgus stress, stable varus stress, 1+ laxity in the sagittal plane  Demonstrates active knee flexion/extension, ankle plantar/dorsiflexion/great toe extension. States sensation intact to gross touch in sural/deep peroneal/superficial peroneal/saphenous/posterior tibial nerve distributions to foot/ankle. Palpable dorsalis pedis and posterior tibialis pulses, cap refill brisk in toes, foot warm/perfused.   Compartments supple throughout thigh and leg, calves supple/NT    Xrays Reviewed  EXAMINATION:   TWO XRAY VIEWS OF THE LEFT KNEE       10/5/2021 9:06 am       COMPARISON:   10 [address] 2020       HISTORY:   [address] PROVIDED HISTORY: Pain, fall   TECHNOLOGIST PROVIDED HISTORY:   Reason for exam:->Pain, fall       FINDINGS:   Intact fixation hardware at the femur and tibia.  The most proximal portion   of the fixation hardware at the femur is excluded however.  There is   degenerative arthropathy at the weight bearing compartments which may be in   part or in full posttraumatic.  Normal soft tissues.           Impression   The visualized fixation hardware at the femur and tibia is intact. Degenerative arthropathy at the knee.         Narrative   EXAMINATION:   2 XRAY VIEWS OF THE LEFT FEMUR       10/19/2021 10:39 am       COMPARISON:   Left femur dated 12/06/2016 and left knee dated 10/05/2021       HISTORY:   ORDERING SYSTEM PROVIDED HISTORY: Pain of left lower extremity   TECHNOLOGIST PROVIDED HISTORY:   Reason for exam:->eval   What reading provider will be dictating this exam?->CRC       FINDINGS:   Extensive postsurgical changes are noted including prior ORIF of fractures of   the proximal left femur, distal left femur and proximal tibia.  The hardware   appears intact with no radiographic evidence of loosening.  Stable lucent   defect is seen at the lateral aspect of the distal left femoral shaft and   metaphysis.  There is generalized osteopenia.       No acute fracture or dislocation is seen. Branden Duff is mild degenerative change   about the knee and hip.  No erosive changes seen.  No focal soft tissue   abnormality is noted.           Impression   1. No evidence of acute osseous abnormality. 2. Prior ORIF of fractures of the proximal and distal left femur and proximal   tibia.  Position and alignment are not significantly changed. 3. Osteopenia.  Mild degenerative change.               ASSESSMENT:    ICD-10-CM    1.  Pain of left lower extremity  M79.605 XR FEMUR LEFT (MIN 2 VIEWS)   2. Tear of medial collateral ligament of left knee, initial encounter  R90.437I        PLAN:   Discussed with patient she appears to have laxity to her MCL of the left knee, recommend patient obtains supportive knee brace with collateral supports when she is up and ambulating. Patient also to continue with supplementation for bone health if she has advanced osteoporotic bone  Patient follow-up in office in 4 weeks for repeat evaluation to see how she is progressing with her brace or sooner if needed  Repeat x-rays next visit    Electronically Signed By  Daisha Alexander DO  10/19/21    NOTE: This report was transcribed using voice recognition software.  Every effort was made to ensure accuracy; however, inadvertent computerized transcription errors may be present

## 2021-11-04 ENCOUNTER — TELEPHONE (OUTPATIENT)
Dept: ORTHOPEDIC SURGERY | Age: 66
End: 2021-11-04

## 2021-11-04 DIAGNOSIS — S83.412A TEAR OF MEDIAL COLLATERAL LIGAMENT OF LEFT KNEE, INITIAL ENCOUNTER: Primary | ICD-10-CM

## 2021-11-04 DIAGNOSIS — M79.605 PAIN OF LEFT LOWER EXTREMITY: ICD-10-CM

## 2022-03-29 ENCOUNTER — HOSPITAL ENCOUNTER (EMERGENCY)
Age: 67
Discharge: HOME | End: 2022-03-29
Payer: COMMERCIAL

## 2022-03-29 VITALS
DIASTOLIC BLOOD PRESSURE: 73 MMHG | HEART RATE: 80 BPM | RESPIRATION RATE: 16 BRPM | TEMPERATURE: 97.4 F | SYSTOLIC BLOOD PRESSURE: 137 MMHG | OXYGEN SATURATION: 100 % | WEIGHT: 110 LBS | BODY MASS INDEX: 21.6 KG/M2 | HEIGHT: 60 IN

## 2022-03-29 DIAGNOSIS — S61.411A LACERATION OF RIGHT HAND WITHOUT FOREIGN BODY, INITIAL ENCOUNTER: Primary | ICD-10-CM

## 2022-03-29 PROCEDURE — 6370000000 HC RX 637 (ALT 250 FOR IP): Performed by: PHYSICIAN ASSISTANT

## 2022-03-29 PROCEDURE — 6360000002 HC RX W HCPCS: Performed by: PHYSICIAN ASSISTANT

## 2022-03-29 PROCEDURE — 90471 IMMUNIZATION ADMIN: CPT | Performed by: PHYSICIAN ASSISTANT

## 2022-03-29 PROCEDURE — 99284 EMERGENCY DEPT VISIT MOD MDM: CPT

## 2022-03-29 PROCEDURE — 90714 TD VACC NO PRESV 7 YRS+ IM: CPT | Performed by: PHYSICIAN ASSISTANT

## 2022-03-29 RX ORDER — TETANUS AND DIPHTHERIA TOXOIDS ADSORBED 2; 2 [LF]/.5ML; [LF]/.5ML
0.5 INJECTION INTRAMUSCULAR ONCE
Status: COMPLETED | OUTPATIENT
Start: 2022-03-29 | End: 2022-03-29

## 2022-03-29 RX ORDER — DIAPER,BRIEF,INFANT-TODD,DISP
EACH MISCELLANEOUS ONCE
Status: COMPLETED | OUTPATIENT
Start: 2022-03-29 | End: 2022-03-29

## 2022-03-29 RX ORDER — AMOXICILLIN AND CLAVULANATE POTASSIUM 875; 125 MG/1; MG/1
1 TABLET, FILM COATED ORAL 2 TIMES DAILY
Qty: 14 TABLET | Refills: 0 | Status: SHIPPED | OUTPATIENT
Start: 2022-03-29 | End: 2022-04-05

## 2022-03-29 RX ORDER — PRAMOXINE HCL/BENZALKONIUM CHL 1%-0.13%
SPRAY, NON-AEROSOL (ML) TOPICAL
Qty: 28 G | Refills: 0 | Status: SHIPPED | OUTPATIENT
Start: 2022-03-29 | End: 2022-04-05

## 2022-03-29 RX ORDER — AMOXICILLIN AND CLAVULANATE POTASSIUM 500; 125 MG/1; MG/1
1 TABLET, FILM COATED ORAL EVERY EVENING
Qty: 10 TABLET | Refills: 0 | Status: SHIPPED | OUTPATIENT
Start: 2022-03-29 | End: 2022-03-29 | Stop reason: ALTCHOICE

## 2022-03-29 RX ADMIN — TETANUS AND DIPHTHERIA TOXOIDS ADSORBED 0.5 ML: 2; 2 INJECTION INTRAMUSCULAR at 10:55

## 2022-03-29 RX ADMIN — BACITRACIN: 500 OINTMENT TOPICAL at 11:08

## 2022-03-29 ASSESSMENT — PAIN - FUNCTIONAL ASSESSMENT: PAIN_FUNCTIONAL_ASSESSMENT: 0-10

## 2022-03-29 ASSESSMENT — PAIN DESCRIPTION - PAIN TYPE: TYPE: ACUTE PAIN

## 2022-03-29 NOTE — Clinical Note
Leodany Greenberg was seen and treated in our emergency department on 3/29/2022. She may return to work on 03/30/2022. If you have any questions or concerns, please don't hesitate to call.       Buffy Parker PA-C

## 2022-03-29 NOTE — ED PROVIDER NOTES
(11/14/2017); fracture surgery (Left, 12/2014); joint replacement (Bilateral); joint replacement (Left); and pr anastomosis,av,any site (Right, 9/28/2018). Social History:  reports that she has never smoked. She has never used smokeless tobacco. She reports that she does not drink alcohol and does not use drugs. Family History: Family history is unknown by patient. Allergies: Ambien [zolpidem tartrate], Klonopin [clonazepam], Lorazepam, and Eszopiclone    Physical Exam   Oxygen Saturation Interpretation: Normal.        ED Triage Vitals [03/29/22 1023]   BP Temp Temp src Pulse Resp SpO2 Height Weight   137/73 97.4 °F (36.3 °C) -- 80 16 100 % 5' (1.524 m) 110 lb (49.9 kg)       Physical Exam  Constitutional:  Alert, development consistent with age. HEENT:  NC/NT. Airway patent. Neck:  Normal ROM. Supple. Respiratory:  Clear to auscultation and breath sounds equal.  CV:  Regular rate and rhythm, normal heart sounds, without pathological murmurs, ectopy, gallops, or rubs. Extremities: No tenderness or edema noted. Integument:  Normal turgor. See clinical image, below. Redness is from blood, not erythema of the underlying tissue. Lymphatics: No lymphangitis or adenopathy noted. Neurological:  Oriented. Motor functions intact. **Informed Consent**    The patient has given verbal consent to have photos taken of right dorsal forearm and electronically inserted into their ED Provider Note as part of their permanent medical record for purposes of illustration, documentation, treatment management and/or medical review. All Images taken on 3/29/22 of patient name: Isreal Brambila were taken by a [facility] approved registered mobile device via [employer] mobile application and transmitted then stored on a secured [place] located within [facility].      Lab / Imaging Results   (All laboratory and radiology results have been personally reviewed by myself)  Labs:  No results found for this visit on 03/29/22. Imaging: All Radiology results interpreted by Radiologist unless otherwise noted. No orders to display     ED Course / Medical Decision Making     Medications   diptheria-tetanus toxoids [facility]) 2-2 LF/0.5ML injection 0.5 mL (0.5 mLs IntraMUSCular Given 3/29/22 1055)   bacitracin zinc ointment ( Topical Given 3/29/22 1108)      Consult(s):   None    Procedure(s):   none    MDM:   Patient presents to the emergency department for a dog scratch to the dorsal aspect of her right hand. Wound occurred last night and duration of injury is too long to provide wound closure at this time. She complains of bleeding, however this is not appreciated while in the emergency department. Wound was thoroughly cleansed and dressed. She received the prescriptions well. Reports a history of chronic kidney disease but is not currently on dialysis since she got a kidney transplant. No signs of infection on physical examination. Vital signs are stable. Wound care discussed. She is well-appearing appropriate discharge and outpatient follow-up with her primary care provider. Return immediately for new or worsening symptoms. Plan of Care/Counseling:  Barry Yung PA-C reviewed today's visit with the patient in addition to providing specific details for the plan of care and counseling regarding the diagnosis and prognosis. Questions are answered at this time and are agreeable with the plan. Assessment     1. Laceration of right hand without foreign body, initial encounter       Plan   Discharged home. Patient condition is good    New Medications     New Prescriptions    AMOXICILLIN-CLAVULANATE (AUGMENTIN) 875-125 MG PER TABLET    Take 1 tablet by mouth 2 times daily for 7 days    NEOMYCIN-POLYMYXIN-PRAMOXINE (NEOSPORIN PLUS) 1 % CREAM    Apply topically 2 times daily.      Electronically signed by Barry Yung PA-C   DD: 3/29/22  **This report was transcribed using voice recognition software. Every effort was made to ensure accuracy; however, inadvertent computerized transcription errors may be present.   END OF ED PROVIDER NOTE        Paddy Amaya PA-C  03/29/22 1118

## 2022-06-07 ENCOUNTER — APPOINTMENT (OUTPATIENT)
Dept: CT IMAGING | Age: 67
End: 2022-06-07
Payer: COMMERCIAL

## 2022-06-07 ENCOUNTER — HOSPITAL ENCOUNTER (EMERGENCY)
Age: 67
Discharge: HOME | End: 2022-06-08
Attending: EMERGENCY MEDICINE
Payer: COMMERCIAL

## 2022-06-07 DIAGNOSIS — R11.2 NON-INTRACTABLE VOMITING WITH NAUSEA, UNSPECIFIED VOMITING TYPE: Primary | ICD-10-CM

## 2022-06-07 LAB
BASOPHILS ABSOLUTE: 0.02 E9/L (ref 0–0.2)
BASOPHILS RELATIVE PERCENT: 0.4 % (ref 0–2)
EOSINOPHILS ABSOLUTE: 0.04 E9/L (ref 0.05–0.5)
EOSINOPHILS RELATIVE PERCENT: 0.8 % (ref 0–6)
HCT VFR BLD CALC: 41.1 % (ref 34–48)
HEMOGLOBIN: 13.2 G/DL (ref 11.5–15.5)
IMMATURE GRANULOCYTES #: 0.01 E9/L
IMMATURE GRANULOCYTES %: 0.2 % (ref 0–5)
LYMPHOCYTES ABSOLUTE: 1.69 E9/L (ref 1.5–4)
LYMPHOCYTES RELATIVE PERCENT: 35.1 % (ref 20–42)
MCH RBC QN AUTO: 34.2 PG (ref 26–35)
MCHC RBC AUTO-ENTMCNC: 32.1 % (ref 32–34.5)
MCV RBC AUTO: 106.5 FL (ref 80–99.9)
MONOCYTES ABSOLUTE: 0.51 E9/L (ref 0.1–0.95)
MONOCYTES RELATIVE PERCENT: 10.6 % (ref 2–12)
NEUTROPHILS ABSOLUTE: 2.55 E9/L (ref 1.8–7.3)
NEUTROPHILS RELATIVE PERCENT: 52.9 % (ref 43–80)
PDW BLD-RTO: 12.6 FL (ref 11.5–15)
PLATELET # BLD: 204 E9/L (ref 130–450)
PMV BLD AUTO: 9.2 FL (ref 7–12)
RBC # BLD: 3.86 E12/L (ref 3.5–5.5)
WBC # BLD: 4.8 E9/L (ref 4.5–11.5)

## 2022-06-07 PROCEDURE — 83605 ASSAY OF LACTIC ACID: CPT

## 2022-06-07 PROCEDURE — 36415 COLL VENOUS BLD VENIPUNCTURE: CPT

## 2022-06-07 PROCEDURE — 85025 COMPLETE CBC W/AUTO DIFF WBC: CPT

## 2022-06-07 PROCEDURE — 84484 ASSAY OF TROPONIN QUANT: CPT

## 2022-06-07 PROCEDURE — 93005 ELECTROCARDIOGRAM TRACING: CPT | Performed by: STUDENT IN AN ORGANIZED HEALTH CARE EDUCATION/TRAINING PROGRAM

## 2022-06-07 PROCEDURE — 99284 EMERGENCY DEPT VISIT MOD MDM: CPT

## 2022-06-07 PROCEDURE — 83690 ASSAY OF LIPASE: CPT

## 2022-06-07 PROCEDURE — 80053 COMPREHEN METABOLIC PANEL: CPT

## 2022-06-07 PROCEDURE — 74176 CT ABD & PELVIS W/O CONTRAST: CPT

## 2022-06-07 RX ORDER — ONDANSETRON 2 MG/ML
4 INJECTION INTRAMUSCULAR; INTRAVENOUS ONCE
Status: DISCONTINUED | OUTPATIENT
Start: 2022-06-07 | End: 2022-06-08 | Stop reason: HOSPADM

## 2022-06-07 RX ORDER — ONDANSETRON 4 MG/1
4 TABLET, ORALLY DISINTEGRATING ORAL ONCE
Status: DISCONTINUED | OUTPATIENT
Start: 2022-06-07 | End: 2022-06-07

## 2022-06-07 RX ORDER — 0.9 % SODIUM CHLORIDE 0.9 %
500 INTRAVENOUS SOLUTION INTRAVENOUS ONCE
Status: DISCONTINUED | OUTPATIENT
Start: 2022-06-07 | End: 2022-06-08 | Stop reason: HOSPADM

## 2022-06-07 ASSESSMENT — PAIN - FUNCTIONAL ASSESSMENT: PAIN_FUNCTIONAL_ASSESSMENT: NONE - DENIES PAIN

## 2022-06-08 VITALS
OXYGEN SATURATION: 98 % | RESPIRATION RATE: 20 BRPM | DIASTOLIC BLOOD PRESSURE: 83 MMHG | TEMPERATURE: 98.1 F | HEART RATE: 88 BPM | SYSTOLIC BLOOD PRESSURE: 171 MMHG

## 2022-06-08 LAB
ALBUMIN SERPL-MCNC: 4.7 G/DL (ref 3.5–5.2)
ALP BLD-CCNC: 91 U/L (ref 35–104)
ALT SERPL-CCNC: 9 U/L (ref 0–32)
ANION GAP SERPL CALCULATED.3IONS-SCNC: 15 MMOL/L (ref 7–16)
AST SERPL-CCNC: 19 U/L (ref 0–31)
BILIRUB SERPL-MCNC: 0.4 MG/DL (ref 0–1.2)
BUN BLDV-MCNC: 19 MG/DL (ref 6–23)
CALCIUM SERPL-MCNC: 9.4 MG/DL (ref 8.6–10.2)
CHLORIDE BLD-SCNC: 101 MMOL/L (ref 98–107)
CO2: 21 MMOL/L (ref 22–29)
CREAT SERPL-MCNC: 0.8 MG/DL (ref 0.5–1)
EKG ATRIAL RATE: 74 BPM
EKG P AXIS: 57 DEGREES
EKG P-R INTERVAL: 196 MS
EKG Q-T INTERVAL: 398 MS
EKG QRS DURATION: 88 MS
EKG QTC CALCULATION (BAZETT): 441 MS
EKG R AXIS: 52 DEGREES
EKG T AXIS: -15 DEGREES
EKG VENTRICULAR RATE: 74 BPM
GFR AFRICAN AMERICAN: >60
GFR NON-AFRICAN AMERICAN: >60 ML/MIN/1.73
GLUCOSE BLD-MCNC: 107 MG/DL (ref 74–99)
LACTIC ACID, SEPSIS: 0.9 MMOL/L (ref 0.5–1.9)
LIPASE: 47 U/L (ref 13–60)
POTASSIUM REFLEX MAGNESIUM: 4.4 MMOL/L (ref 3.5–5)
SODIUM BLD-SCNC: 137 MMOL/L (ref 132–146)
TOTAL PROTEIN: 7.6 G/DL (ref 6.4–8.3)
TROPONIN, HIGH SENSITIVITY: 7 NG/L (ref 0–9)
TROPONIN, HIGH SENSITIVITY: 8 NG/L (ref 0–9)

## 2022-06-08 PROCEDURE — 84484 ASSAY OF TROPONIN QUANT: CPT

## 2022-06-08 NOTE — ED PROVIDER NOTES
Chief complaint: Emesis    HPI:  6/7/22, Time: 9:55 PM EDT       Joyce Ornelas is a 74 y.o. female with a history of a lung and kidney transplant presenting to the ED for nausea and vomiting that started 1 hour prior to arrival.  The complaint has been intermittent, moderate in severity, and worsened by nothing. Patient states the nausea and vomiting started shortly after eating a homemade meal at home. She did not remember what she ate. She vomited approximately 6 times in her  called EMS. She denies any hematemesis or bilious emesis. Patient without diarrhea. She denies any chest pain, shortness of breath, or abdominal pain. She stated she just had blood work done earlier today at [facility]. Patient denies fever/chills, sore throat, cough, congestion, chest pain, shortness of breath, edema, headache, visual disturbances, focal paresthesias, focal weakness, abdominal pain, diarrhea, constipation, dysuria, hematuria, trauma, neck or back pain, rash or other complaints.      ROS:   A complete review of systems was performed and all pertinent positives and negatives are stated within HPI, all other systems reviewed and are negative.            --------------------------------------------- PAST HISTORY ---------------------------------------------  Past Medical History:  has a past medical history of Acute on chronic respiratory failure with hypoxia ([address] Anemia, Cardiomegaly, CHF (congestive heart failure) ([address] Chronic kidney disease, Constipation, Diaphragmatic hernia, Diverticulitis, Epilepsy ([address] Falls, GERD (gastroesophageal reflux disease), Hemiparesis ([address] Hemiplegia ([address] Hemod[address] patient ([facility] [address] History of blood transfusion, History of lung transplant ([facility] [address] Hyperparathyroidism ([address] Hypertension, Immunosuppressed status ([facility] [address] Insomnia, IPF (idiopathic pulmonary fibrosis) ([address] Kidney stone, Neutropenia ([address] Nontraumatic cortical hemorrhage of right cerebral [facility]), Osteoporosis, PONV (postoperative nausea and vomiting), Pulmonary nodule, and Seizures ([facility] [address] Past Surgical History:  has a past surgical history that includes Lung transplant (2003); hip surgery; knee surgery; hernia repair; Cholecystectomy; Fixation Kyphoplasty (11/3/2015); Leg Surgery (Left, 12/06/2016); Dialysis fistula creation (Right, 04/27/2017); back surgery; AV fistula repair (Right, 08/17/2017); Colonoscopy; other surgical history (11/14/2017); fracture surgery (Left, 12/2014); joint replacement (Bilateral); joint replacement (Left); and pr anastomosis,av,any site (Right, 9/28/2018). Social History:  reports that she has never smoked. She has never used smokeless tobacco. She reports that she does not drink alcohol and does not use drugs. Family History: Family history is unknown by patient. The patients home medications have been reviewed. Allergies: Ambien [zolpidem tartrate], Klonopin [clonazepam], Lorazepam, and Eszopiclone        ----------------------------------------PHYSICAL EXAM--------------------------------------    Constitutional:  Well developed, well nourished, no acute distress, non-toxic appearance   Eyes:  PERRL, conjunctiva normal, EOMI  HENT:  Atraumatic, external ears normal, nose normal, oropharynx moist, no pharyngeal exudates. Neck- normal range of motion, no nuchal rigidity   Respiratory:  No respiratory distress, normal breath sounds, no rales, no wheezing   Cardiovascular:  Normal rate, normal rhythm, no murmurs. Radial  pulses 2+ bilaterally. GI:  Soft, nondistended, nontender, no organomegaly, no mass, no rebound, no guarding   Musculoskeletal:  No edema, no tenderness, no deformities. Back- no tenderness  Integument:  Well hydrated, no rash. Adequate perfusion. Lymphatic:  No cervical lymphadenopathy noted   Neurologic:  Alert & oriented x 3, CN 2-12 normal, normal motor function, normal sensory function, no focal deficits noted.  Normal gait.    Psychiatric:  Speech and behavior appropriate       -------------------------------------------------- RESULTS -------------------------------------------------  I have personally reviewed all laboratory and imaging results for this patient. Results are listed below.      LABS:  Results for orders placed or performed during the hospital encounter of 06/07/22   CBC with Auto Differential   Result Value Ref Range    WBC 4.8 4.5 - 11.5 E9/L    RBC 3.86 3.50 - 5.50 E12/L    Hemoglobin 13.2 11.5 - 15.5 g/dL    Hematocrit 41.1 34.0 - 48.0 %    .5 (H) 80.0 - 99.9 fL    MCH 34.2 26.0 - 35.0 pg    MCHC 32.1 32.0 - 34.5 %    RDW 12.6 11.5 - 15.0 fL    Platelets 376 483 - 290 E9/L    MPV 9.2 7.0 - 12.0 fL    Neutrophils % 52.9 43.0 - 80.0 %    Immature Granulocytes % 0.2 0.0 - 5.0 %    Lymphocytes % 35.1 20.0 - 42.0 %    Monocytes % 10.6 2.0 - 12.0 %    Eosinophils % 0.8 0.0 - 6.0 %    Basophils % 0.4 0.0 - 2.0 %    Neutrophils Absolute 2.55 1.80 - 7.30 E9/L    Immature Granulocytes # 0.01 E9/L    Lymphocytes Absolute 1.69 1.50 - 4.00 E9/L    Monocytes Absolute 0.51 0.10 - 0.95 E9/L    Eosinophils Absolute 0.04 (L) 0.05 - 0.50 E9/L    Basophils Absolute 0.02 0.00 - 0.20 E9/L   Comprehensive Metabolic Panel w/ Reflex to MG   Result Value Ref Range    Sodium 137 132 - 146 mmol/L    Potassium reflex Magnesium 4.4 3.5 - 5.0 mmol/L    Chloride 101 98 - 107 mmol/L    CO2 21 (L) 22 - 29 mmol/L    Anion Gap 15 7 - 16 mmol/L    Glucose 107 (H) 74 - 99 mg/dL    BUN 19 6 - 23 mg/dL    CREATININE 0.8 0.5 - 1.0 mg/dL    GFR Non-African American >60 >=60 mL/min/1.73    GFR African American >60     Calcium 9.4 8.6 - 10.2 mg/dL    Total Protein 7.6 6.4 - 8.3 g/dL    Albumin 4.7 3.5 - 5.2 g/dL    Total Bilirubin 0.4 0.0 - 1.2 mg/dL    Alkaline Phosphatase 91 35 - 104 U/L    ALT 9 0 - 32 U/L    AST 19 0 - 31 U/L   Troponin   Result Value Ref Range    Troponin, High Sensitivity 7 0 - 9 ng/L   Lactate, Sepsis   Result Value Ref Range    Lactic Acid, Sepsis 0.9 0.5 - 1.9 mmol/L   Lipase   Result Value Ref Range    Lipase 47 13 - 60 U/L   Troponin   Result Value Ref Range    Troponin, High Sensitivity 8 0 - 9 ng/L   EKG 12 Lead   Result Value Ref Range    Ventricular Rate 74 BPM    Atrial Rate 74 BPM    P-R Interval 196 ms    QRS Duration 88 ms    Q-T Interval 398 ms    QTc Calculation (Bazett) 441 ms    P Axis 57 degrees    R Axis 52 degrees    T Axis -15 degrees       RADIOLOGY:  Interpreted by Radiologist.  CT ABDOMEN PELVIS WO CONTRAST Additional Contrast? None   Final Result   1. Apparent thickening of the distal esophagus may be due to esophagitis,   mass lesion or simply a hiatal hernia. Endoscopic evaluation recommended   2. Somewhat prominent fecal retention in the distal colon. No bowel wall   thickening or obstruction. 3. Atrophic native kidneys with intrarenal calculi. No hydronephrosis. 4. Right iliac fossa transplant kidney appears grossly unremarkable. RECOMMENDATIONS:   Unavailable             ------------------------- NURSING NOTES AND VITALS REVIEWED ---------------------------  The nursing notes within the ED encounter and vital signs as below have been reviewed by myself. BP (!) 171/83   Pulse 88   Temp 98.1 °F (36.7 °C) (Oral)   Resp 20   SpO2 98%   Oxygen Saturation Interpretation: Normal      The patients available past medical records and past encounters were reviewed. ------------------------------ ED COURSE/MEDICAL DECISION MAKING----------------------  Medications - No data to display  ED Course as of 06/08/22 0525   Tue Jun 07, 2022   2306 EKG read and interpreted by me. Rate 74, normal sinus rhythm, normal axis, no ST elevation, T wave inversions in lead V2 through V6,, , no significant changes from previous EKG.  [TO]   Wed Jun 08, 2022   0007 Patient was initially refusing blood work or IV fluids, however she later changed her mind and agreed to have her blood drawn. [TO] 0150 Delta troponin of 1. Patient tolerated p.o. challenge. [TO]      ED Course User Index  [TO] Joel Boxer, DO       MEDICATIONS  Discharge Medication List as of 6/8/2022  1:51 AM            Medical Decision Making:    Patient was seen and examined for nausea with vomiting. Patient seen in no acute distress with no chest or abdominal tenderness. Patient initially refused blood work or IV medications, however she later agreed to a CT scan and blood work. She continued to refuse any Zofran or IV fluids. Lab work was within normal limits including 2 negative troponins. No significant EKG changes were found. CT scan results were reviewed with the patient. Patient tolerated a p.o. challenge. She had no episodes of emesis while in the ED. She is stable for discharge to home and was advised to follow-up with her primary care provider. This patient's ED course included: re-evaluation prior to disposition, multiple bedside re-evaluations and a personal history and physicial eaxmination    This patient has remained hemodynamically stable during their ED course. Counseling: The emergency provider has spoken with the patient and discussed todays results, in addition to providing specific details for the plan of care and counseling regarding the diagnosis and prognosis. Questions are answered at this time and they are agreeable with the plan.    --------------------------- IMPRESSION AND DISPOSITION ---------------------------------    IMPRESSION  1.  Non-intractable vomiting with nausea, unspecified vomiting type        DISPOSITION  Disposition: Discharge to home  Patient condition is stable             Joel Boxer, DO  Resident  06/08/22 9071

## 2022-06-08 NOTE — ED NOTES
Patient refuses to have IV and labs drawn, states had 6 tubes drawn this morning and doesn't feel it needs done. Denies any  Symptoms at present, denies and nausea, no vomiting since arriving in ED.   Provider made aware     Darien Bateman RN  06/07/22 3808

## 2022-07-20 ENCOUNTER — HOSPITAL ENCOUNTER (EMERGENCY)
Age: 67
Discharge: HOME | End: 2022-07-21
Attending: STUDENT IN AN ORGANIZED HEALTH CARE EDUCATION/TRAINING PROGRAM
Payer: COMMERCIAL

## 2022-07-20 ENCOUNTER — APPOINTMENT (OUTPATIENT)
Dept: CT IMAGING | Age: 67
End: 2022-07-20
Payer: COMMERCIAL

## 2022-07-20 ENCOUNTER — APPOINTMENT (OUTPATIENT)
Dept: GENERAL RADIOLOGY | Age: 67
End: 2022-07-20
Payer: COMMERCIAL

## 2022-07-20 DIAGNOSIS — K20.90 ESOPHAGITIS: ICD-10-CM

## 2022-07-20 DIAGNOSIS — K29.00 ACUTE GASTRITIS WITHOUT HEMORRHAGE, UNSPECIFIED GASTRITIS TYPE: Primary | ICD-10-CM

## 2022-07-20 LAB
ABO/RH: NORMAL
ALBUMIN SERPL-MCNC: 4.9 G/DL (ref 3.5–5.2)
ALP BLD-CCNC: 93 U/L (ref 35–104)
ALT SERPL-CCNC: 8 U/L (ref 0–32)
ANION GAP SERPL CALCULATED.3IONS-SCNC: 17 MMOL/L (ref 7–16)
ANTIBODY SCREEN: NORMAL
AST SERPL-CCNC: 19 U/L (ref 0–31)
BACTERIA: ABNORMAL /HPF
BASOPHILS ABSOLUTE: 0.03 E9/L (ref 0–0.2)
BASOPHILS RELATIVE PERCENT: 0.5 % (ref 0–2)
BILIRUB SERPL-MCNC: 0.4 MG/DL (ref 0–1.2)
BILIRUBIN URINE: ABNORMAL
BLOOD, URINE: ABNORMAL
BUN BLDV-MCNC: 28 MG/DL (ref 6–23)
CALCIUM SERPL-MCNC: 9.8 MG/DL (ref 8.6–10.2)
CHLORIDE BLD-SCNC: 99 MMOL/L (ref 98–107)
CLARITY: CLEAR
CO2: 21 MMOL/L (ref 22–29)
COLOR: YELLOW
CREAT SERPL-MCNC: 1.1 MG/DL (ref 0.5–1)
EOSINOPHILS ABSOLUTE: 0.13 E9/L (ref 0.05–0.5)
EOSINOPHILS RELATIVE PERCENT: 2.3 % (ref 0–6)
EPITHELIAL CELLS, UA: ABNORMAL /HPF
GFR AFRICAN AMERICAN: >60
GFR NON-AFRICAN AMERICAN: 50 ML/MIN/1.73
GLUCOSE BLD-MCNC: 120 MG/DL (ref 74–99)
GLUCOSE URINE: NEGATIVE MG/DL
HCT VFR BLD CALC: 41.5 % (ref 34–48)
HEMOGLOBIN: 13.6 G/DL (ref 11.5–15.5)
IMMATURE GRANULOCYTES #: 0.02 E9/L
IMMATURE GRANULOCYTES %: 0.4 % (ref 0–5)
INFLUENZA A BY PCR: NOT DETECTED
INFLUENZA B BY PCR: NOT DETECTED
KETONES, URINE: 15 MG/DL
LACTIC ACID: 1.1 MMOL/L (ref 0.5–2.2)
LEUKOCYTE ESTERASE, URINE: NEGATIVE
LIPASE: 61 U/L (ref 13–60)
LYMPHOCYTES ABSOLUTE: 2.06 E9/L (ref 1.5–4)
LYMPHOCYTES RELATIVE PERCENT: 36.2 % (ref 20–42)
MCH RBC QN AUTO: 34.3 PG (ref 26–35)
MCHC RBC AUTO-ENTMCNC: 32.8 % (ref 32–34.5)
MCV RBC AUTO: 104.8 FL (ref 80–99.9)
MONOCYTES ABSOLUTE: 0.63 E9/L (ref 0.1–0.95)
MONOCYTES RELATIVE PERCENT: 11.1 % (ref 2–12)
MUCUS: PRESENT /LPF
NEUTROPHILS ABSOLUTE: 2.82 E9/L (ref 1.8–7.3)
NEUTROPHILS RELATIVE PERCENT: 49.5 % (ref 43–80)
NITRITE, URINE: NEGATIVE
PDW BLD-RTO: 12.3 FL (ref 11.5–15)
PH UA: 5.5 (ref 5–9)
PLATELET # BLD: 222 E9/L (ref 130–450)
PMV BLD AUTO: 9.6 FL (ref 7–12)
POTASSIUM REFLEX MAGNESIUM: 4.1 MMOL/L (ref 3.5–5)
PROTEIN UA: 100 MG/DL
RBC # BLD: 3.96 E12/L (ref 3.5–5.5)
RBC UA: ABNORMAL /HPF (ref 0–2)
SARS-COV-2, NAAT: NOT DETECTED
SODIUM BLD-SCNC: 137 MMOL/L (ref 132–146)
SPECIFIC GRAVITY UA: >=1.03 (ref 1–1.03)
TOTAL PROTEIN: 7.9 G/DL (ref 6.4–8.3)
TROPONIN, HIGH SENSITIVITY: 12 NG/L (ref 0–9)
UROBILINOGEN, URINE: 0.2 E.U./DL
WBC # BLD: 5.7 E9/L (ref 4.5–11.5)
WBC UA: ABNORMAL /HPF (ref 0–5)

## 2022-07-20 PROCEDURE — 80053 COMPREHEN METABOLIC PANEL: CPT

## 2022-07-20 PROCEDURE — 74176 CT ABD & PELVIS W/O CONTRAST: CPT

## 2022-07-20 PROCEDURE — 83605 ASSAY OF LACTIC ACID: CPT

## 2022-07-20 PROCEDURE — 96374 THER/PROPH/DIAG INJ IV PUSH: CPT

## 2022-07-20 PROCEDURE — 86900 BLOOD TYPING SEROLOGIC ABO: CPT

## 2022-07-20 PROCEDURE — 36415 COLL VENOUS BLD VENIPUNCTURE: CPT

## 2022-07-20 PROCEDURE — 87635 SARS-COV-2 COVID-19 AMP PRB: CPT

## 2022-07-20 PROCEDURE — 81001 URINALYSIS AUTO W/SCOPE: CPT

## 2022-07-20 PROCEDURE — 93005 ELECTROCARDIOGRAM TRACING: CPT | Performed by: NURSE PRACTITIONER

## 2022-07-20 PROCEDURE — 84484 ASSAY OF TROPONIN QUANT: CPT

## 2022-07-20 PROCEDURE — 87502 INFLUENZA DNA AMP PROBE: CPT

## 2022-07-20 PROCEDURE — 6360000002 HC RX W HCPCS: Performed by: STUDENT IN AN ORGANIZED HEALTH CARE EDUCATION/TRAINING PROGRAM

## 2022-07-20 PROCEDURE — 86850 RBC ANTIBODY SCREEN: CPT

## 2022-07-20 PROCEDURE — 99285 EMERGENCY DEPT VISIT HI MDM: CPT

## 2022-07-20 PROCEDURE — 85025 COMPLETE CBC W/AUTO DIFF WBC: CPT

## 2022-07-20 PROCEDURE — 71045 X-RAY EXAM CHEST 1 VIEW: CPT

## 2022-07-20 PROCEDURE — 86901 BLOOD TYPING SEROLOGIC RH(D): CPT

## 2022-07-20 PROCEDURE — 83690 ASSAY OF LIPASE: CPT

## 2022-07-20 RX ORDER — ONDANSETRON 2 MG/ML
4 INJECTION INTRAMUSCULAR; INTRAVENOUS ONCE
Status: COMPLETED | OUTPATIENT
Start: 2022-07-20 | End: 2022-07-20

## 2022-07-20 RX ADMIN — ONDANSETRON 4 MG: 2 INJECTION INTRAMUSCULAR; INTRAVENOUS at 22:24

## 2022-07-20 ASSESSMENT — PAIN DESCRIPTION - LOCATION: LOCATION: ABDOMEN

## 2022-07-20 ASSESSMENT — PAIN - FUNCTIONAL ASSESSMENT: PAIN_FUNCTIONAL_ASSESSMENT: 0-10

## 2022-07-20 ASSESSMENT — PAIN SCALES - GENERAL: PAINLEVEL_OUTOF10: 8

## 2022-07-20 ASSESSMENT — PAIN DESCRIPTION - ORIENTATION: ORIENTATION: RIGHT;LEFT;UPPER

## 2022-07-21 VITALS
SYSTOLIC BLOOD PRESSURE: 157 MMHG | DIASTOLIC BLOOD PRESSURE: 95 MMHG | HEIGHT: 60 IN | BODY MASS INDEX: 21.6 KG/M2 | WEIGHT: 110 LBS | OXYGEN SATURATION: 98 % | TEMPERATURE: 97.7 F | HEART RATE: 78 BPM | RESPIRATION RATE: 15 BRPM

## 2022-07-21 LAB
EKG ATRIAL RATE: 72 BPM
EKG ATRIAL RATE: 80 BPM
EKG P AXIS: 39 DEGREES
EKG P AXIS: 51 DEGREES
EKG P-R INTERVAL: 150 MS
EKG P-R INTERVAL: 158 MS
EKG Q-T INTERVAL: 356 MS
EKG Q-T INTERVAL: 422 MS
EKG QRS DURATION: 78 MS
EKG QRS DURATION: 82 MS
EKG QTC CALCULATION (BAZETT): 410 MS
EKG QTC CALCULATION (BAZETT): 462 MS
EKG R AXIS: 48 DEGREES
EKG R AXIS: 49 DEGREES
EKG T AXIS: -57 DEGREES
EKG T AXIS: -79 DEGREES
EKG VENTRICULAR RATE: 72 BPM
EKG VENTRICULAR RATE: 80 BPM
TROPONIN, HIGH SENSITIVITY: 10 NG/L (ref 0–9)

## 2022-07-21 PROCEDURE — 2580000003 HC RX 258: Performed by: STUDENT IN AN ORGANIZED HEALTH CARE EDUCATION/TRAINING PROGRAM

## 2022-07-21 PROCEDURE — 6370000000 HC RX 637 (ALT 250 FOR IP): Performed by: STUDENT IN AN ORGANIZED HEALTH CARE EDUCATION/TRAINING PROGRAM

## 2022-07-21 PROCEDURE — 2500000003 HC RX 250 WO HCPCS: Performed by: STUDENT IN AN ORGANIZED HEALTH CARE EDUCATION/TRAINING PROGRAM

## 2022-07-21 PROCEDURE — 93005 ELECTROCARDIOGRAM TRACING: CPT | Performed by: STUDENT IN AN ORGANIZED HEALTH CARE EDUCATION/TRAINING PROGRAM

## 2022-07-21 PROCEDURE — 96375 TX/PRO/DX INJ NEW DRUG ADDON: CPT

## 2022-07-21 PROCEDURE — A4216 STERILE WATER/SALINE, 10 ML: HCPCS | Performed by: STUDENT IN AN ORGANIZED HEALTH CARE EDUCATION/TRAINING PROGRAM

## 2022-07-21 RX ORDER — FAMOTIDINE 20 MG/1
20 TABLET, FILM COATED ORAL DAILY
Qty: 14 TABLET | Refills: 0 | Status: SHIPPED | OUTPATIENT
Start: 2022-07-21 | End: 2022-08-19

## 2022-07-21 RX ADMIN — FAMOTIDINE 20 MG: 10 INJECTION INTRAVENOUS at 00:20

## 2022-07-21 RX ADMIN — ALUMINUM HYDROXIDE, MAGNESIUM HYDROXIDE, AND SIMETHICONE: 200; 200; 20 SUSPENSION ORAL at 00:19

## 2022-07-21 NOTE — ED PROVIDER NOTES
Department of Emergency Medicine  FIRST PROVIDER TRIAGE NOTE             Independent MLP           7/20/22  8:57 PM EDT    Date of Encounter: 7/20/22   MRN: [id number]      HPI: Richard Deutsch is a 74 y.o. female who presents to the ED for Abdominal Pain (epigastric)  She additionally complains of left sided chest pain, nausea, vomiting and several episodes of diarrhea. She is pale and denies any hematemesis, bloody or tarry stools. ROS: Negative for back pain, fever, urinary complaints, rash, headache, or dizziness. PE: Gen Appearance/Constitutional: alert pale  HEENT: NC/NT. PERRLA,  Airway patent. Neck: supple  CV: regular rate  Musculoskeletal: moves all extremities x 4     Initial Plan of Care: All treatment areas with department are currently occupied. Plan to order/Initiate the following while awaiting opening in ED: labs and EKG.   Initiate Treatment-Testing, Proceed toTreatment Area When Bed Available for ED Attending/MLP to Continue Care    Electronically signed by ESTRELLA Martínez CNP   DD: 7/20/22      ESTRELLA Martínez CNP  07/20/22 2051

## 2022-07-31 ENCOUNTER — APPOINTMENT (OUTPATIENT)
Dept: CT IMAGING | Age: 67
End: 2022-07-31
Payer: COMMERCIAL

## 2022-07-31 ENCOUNTER — HOSPITAL ENCOUNTER (EMERGENCY)
Age: 67
Discharge: HOME | End: 2022-07-31
Payer: COMMERCIAL

## 2022-07-31 VITALS
TEMPERATURE: 97.2 F | DIASTOLIC BLOOD PRESSURE: 93 MMHG | WEIGHT: 95 LBS | BODY MASS INDEX: 18.65 KG/M2 | RESPIRATION RATE: 16 BRPM | SYSTOLIC BLOOD PRESSURE: 145 MMHG | HEIGHT: 60 IN | OXYGEN SATURATION: 97 % | HEART RATE: 80 BPM

## 2022-07-31 DIAGNOSIS — M54.2 NECK PAIN: Primary | ICD-10-CM
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
## 2022-07-31 DIAGNOSIS — R11.2 NAUSEA AND VOMITING, INTRACTABILITY OF VOMITING NOT SPECIFIED, UNSPECIFIED VOMITING TYPE: ICD-10-CM

## 2022-07-31 LAB
ALBUMIN SERPL-MCNC: 4 G/DL (ref 3.5–5.2)
ALP BLD-CCNC: 79 U/L (ref 35–104)
ALT SERPL-CCNC: 8 U/L (ref 0–32)
ANION GAP SERPL CALCULATED.3IONS-SCNC: 16 MMOL/L (ref 7–16)
AST SERPL-CCNC: 13 U/L (ref 0–31)
BASOPHILS ABSOLUTE: 0.02 E9/L (ref 0–0.2)
BASOPHILS RELATIVE PERCENT: 0.3 % (ref 0–2)
BILIRUB SERPL-MCNC: 0.5 MG/DL (ref 0–1.2)
BUN BLDV-MCNC: 34 MG/DL (ref 6–23)
CALCIUM SERPL-MCNC: 9.5 MG/DL (ref 8.6–10.2)
CHLORIDE BLD-SCNC: 96 MMOL/L (ref 98–107)
CO2: 21 MMOL/L (ref 22–29)
CREAT SERPL-MCNC: 1.3 MG/DL (ref 0.5–1)
EOSINOPHILS ABSOLUTE: 0.02 E9/L (ref 0.05–0.5)
EOSINOPHILS RELATIVE PERCENT: 0.3 % (ref 0–6)
GFR AFRICAN AMERICAN: 50
GFR NON-AFRICAN AMERICAN: 41 ML/MIN/1.73
GLUCOSE BLD-MCNC: 138 MG/DL (ref 74–99)
HCT VFR BLD CALC: 38.9 % (ref 34–48)
HEMOGLOBIN: 13 G/DL (ref 11.5–15.5)
IMMATURE GRANULOCYTES #: 0.04 E9/L
IMMATURE GRANULOCYTES %: 0.5 % (ref 0–5)
LIPASE: 35 U/L (ref 13–60)
LYMPHOCYTES ABSOLUTE: 0.6 E9/L (ref 1.5–4)
LYMPHOCYTES RELATIVE PERCENT: 8 % (ref 20–42)
MCH RBC QN AUTO: 34.7 PG (ref 26–35)
MCHC RBC AUTO-ENTMCNC: 33.4 % (ref 32–34.5)
MCV RBC AUTO: 103.7 FL (ref 80–99.9)
MONOCYTES ABSOLUTE: 0.77 E9/L (ref 0.1–0.95)
MONOCYTES RELATIVE PERCENT: 10.3 % (ref 2–12)
NEUTROPHILS ABSOLUTE: 6.01 E9/L (ref 1.8–7.3)
NEUTROPHILS RELATIVE PERCENT: 80.6 % (ref 43–80)
PDW BLD-RTO: 12.2 FL (ref 11.5–15)
PLATELET # BLD: 214 E9/L (ref 130–450)
PMV BLD AUTO: 10 FL (ref 7–12)
POTASSIUM REFLEX MAGNESIUM: 4.3 MMOL/L (ref 3.5–5)
RBC # BLD: 3.75 E12/L (ref 3.5–5.5)
SODIUM BLD-SCNC: 133 MMOL/L (ref 132–146)
TOTAL PROTEIN: 7.7 G/DL (ref 6.4–8.3)
WBC # BLD: 7.5 E9/L (ref 4.5–11.5)

## 2022-07-31 PROCEDURE — 80053 COMPREHEN METABOLIC PANEL: CPT

## 2022-07-31 PROCEDURE — 83690 ASSAY OF LIPASE: CPT

## 2022-07-31 PROCEDURE — 85025 COMPLETE CBC W/AUTO DIFF WBC: CPT

## 2022-07-31 PROCEDURE — 6370000000 HC RX 637 (ALT 250 FOR IP): Performed by: PHYSICIAN ASSISTANT

## 2022-07-31 PROCEDURE — 70450 CT HEAD/BRAIN W/O DYE: CPT

## 2022-07-31 PROCEDURE — 72125 CT NECK SPINE W/O DYE: CPT

## 2022-07-31 PROCEDURE — 99284 EMERGENCY DEPT VISIT MOD MDM: CPT

## 2022-07-31 RX ORDER — ONDANSETRON 4 MG/1
4 TABLET, FILM COATED ORAL EVERY 8 HOURS PRN
Qty: 12 TABLET | Refills: 0 | Status: ON HOLD | OUTPATIENT
Start: 2022-07-31 | End: 2022-08-17 | Stop reason: HOSPADM

## 2022-07-31 RX ORDER — OXYCODONE HYDROCHLORIDE AND ACETAMINOPHEN 5; 325 MG/1; MG/1
1 TABLET ORAL ONCE
Status: COMPLETED | OUTPATIENT
Start: 2022-07-31 | End: 2022-07-31

## 2022-07-31 RX ADMIN — OXYCODONE AND ACETAMINOPHEN 1 TABLET: 5; 325 TABLET ORAL at 10:57

## 2022-07-31 ASSESSMENT — PAIN - FUNCTIONAL ASSESSMENT
PAIN_FUNCTIONAL_ASSESSMENT: 0-10
PAIN_FUNCTIONAL_ASSESSMENT: NONE - DENIES PAIN

## 2022-07-31 ASSESSMENT — PAIN DESCRIPTION - LOCATION: LOCATION: BACK

## 2022-07-31 ASSESSMENT — PAIN SCALES - GENERAL
PAINLEVEL_OUTOF10: 10
PAINLEVEL_OUTOF10: 7
PAINLEVEL_OUTOF10: 7

## 2022-07-31 ASSESSMENT — PAIN DESCRIPTION - DESCRIPTORS: DESCRIPTORS: ACHING

## 2022-07-31 ASSESSMENT — PAIN DESCRIPTION - PAIN TYPE: TYPE: ACUTE PAIN

## 2022-07-31 NOTE — DISCHARGE INSTRUCTIONS
Pt has slight bump in BUN/Creat. May need to see her nephrologist.    Not abnormal CT abd/pelvis from last week. Concern for thickening in stomach, ? Gastritis? Needs EGD. Abnormal lymph nodes seen as well. Needs follow-up.    Thank you

## 2022-07-31 NOTE — ED PROVIDER NOTES
[facility]                                                                                                                                    Department of Emergency Medicine   ED  Provider Note  Admit Date/RoomTime: 7/31/2022 10:20 AM  ED Room: 22/22        HPI:  7/31/22,   Time: 10:40 AM EDT         Josie Phan is a 74 y.o. female presenting to the ED for fall out of bed with increased neck pain, beginning this AM.  The complaint has been persistent, moderate in severity, and worsened by nothing. The patient is here today with her  from home. States she fell out of bed this AM and is reporting increased neck pain. Per  patient has history of chronic neck pain. Patient reports she fell out of bed after somehow \"tangling\" with the dog. States she landed on her back and hit her head. No LOC. Patient is not on any blood thinners. Denies HA or confusions. The patient is having nausea. Seen holding emesis back.  reports they were just seen last week for the same.    States they were told that she has gastritis and needs to see specialist.   Patient reports they did call the specialist but couldn't get an appointment     ROS:     Constitutional: Negative for fever and chills  HENT: Negative for ear pain, sore throat and sinus pressure  Eyes: Negative for pain, discharge and redness  Respiratory:  Negative for shortness of breath, cough and wheezing  Cardiovascular: Negative for CP, edema or palpitations  Gastrointestinal: Negative for nausea, vomiting, diarrhea and abdominal distention  Genitourinary: Negative for dysuria and frequency  Musculoskeletal: Negative for back pain and arthralgia  Skin: Negative for rash and wound  Neurological: Negative for weakness and headaches  Hematological: Negative for adenopathy    All other systems reviewed and are negative      -------------------------------- PAST HISTORY ----------------------------------  Past Medical History: has a past medical history of Acute on chronic respiratory failure with hypoxia ([facility] Anemia, Cardiomegaly, CHF (congestive heart failure) ([address] Chronic kidney disease, Constipation, Diaphragmatic hernia, Diverticulitis, Epilepsy ([address] Falls, GERD (gastroesophageal reflux disease), Hemiparesis ([address] Hemiplegia ([facility] Hemodialysis patient ([facility] History of blood transfusion, History of lung transplant ([facility] Hyperparathyroidism ([address] Hypertension, Immunosuppressed status ([facility] Insomnia, IPF (idiopathic pulmonary fibrosis) ([facility] Kidney stone, Neutropenia ([facility] 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[facility] Osteoporosis, PONV (postoperative nausea and vomiting), Pulmonary nodule, and Seizures ([facility] Past Surgical History:  has a past surgical history that includes Lung transplant (2003); hip surgery; knee surgery; hernia repair; Cholecystectomy; Fixation Kyphoplasty (11/3/2015); Leg Surgery (Left, 12/06/2016); Dialysis fistula creation (Right, 04/27/2017); back surgery; AV fistula repair (Right, 08/17/2017); Colonoscopy; other surgical history (11/14/2017); fracture surgery (Left, 12/2014); joint replacement (Bilateral); joint replacement (Left); and pr anastomosis,av,any site (Right, 9/28/2018). Social History:  reports that she has never smoked. She has never used smokeless tobacco. She reports that she does not drink alcohol and does not use drugs. Family History: Family history is unknown by patient. The patients home medications have been reviewed.     Allergies: Ambien [zolpidem tartrate], Klonopin [clonazepam], Lorazepam, and Eszopiclone    --------------------------------- RESULTS ------------------------------------------  All laboratory and radiology results have been personally reviewed by myself   LABS:  Results for orders placed or performed during the hospital encounter of 07/31/22   CBC with Auto Differential   Result Value Ref Range    WBC 7.5 4.5 - 11.5 E9/L RBC 3.75 3.50 - 5.50 E12/L    Hemoglobin 13.0 11.5 - 15.5 g/dL    Hematocrit 38.9 34.0 - 48.0 %    .7 (H) 80.0 - 99.9 fL    MCH 34.7 26.0 - 35.0 pg    MCHC 33.4 32.0 - 34.5 %    RDW 12.2 11.5 - 15.0 fL    Platelets 336 579 - 786 E9/L    MPV 10.0 7.0 - 12.0 fL    Neutrophils % 80.6 (H) 43.0 - 80.0 %    Immature Granulocytes % 0.5 0.0 - 5.0 %    Lymphocytes % 8.0 (L) 20.0 - 42.0 %    Monocytes % 10.3 2.0 - 12.0 %    Eosinophils % 0.3 0.0 - 6.0 %    Basophils % 0.3 0.0 - 2.0 %    Neutrophils Absolute 6.01 1.80 - 7.30 E9/L    Immature Granulocytes # 0.04 E9/L    Lymphocytes Absolute 0.60 (L) 1.50 - 4.00 E9/L    Monocytes Absolute 0.77 0.10 - 0.95 E9/L    Eosinophils Absolute 0.02 (L) 0.05 - 0.50 E9/L    Basophils Absolute 0.02 0.00 - 0.20 E9/L   Comprehensive Metabolic Panel w/ Reflex to MG   Result Value Ref Range    Sodium 133 132 - 146 mmol/L    Potassium reflex Magnesium 4.3 3.5 - 5.0 mmol/L    Chloride 96 (L) 98 - 107 mmol/L    CO2 21 (L) 22 - 29 mmol/L    Anion Gap 16 7 - 16 mmol/L    Glucose 138 (H) 74 - 99 mg/dL    BUN 34 (H) 6 - 23 mg/dL    Creatinine 1.3 (H) 0.5 - 1.0 mg/dL    GFR Non-African American 41 >=60 mL/min/1.73    GFR African American 50     Calcium 9.5 8.6 - 10.2 mg/dL    Total Protein 7.7 6.4 - 8.3 g/dL    Albumin 4.0 3.5 - 5.2 g/dL    Total Bilirubin 0.5 0.0 - 1.2 mg/dL    Alkaline Phosphatase 79 35 - 104 U/L    ALT 8 0 - 32 U/L    AST 13 0 - 31 U/L   Lipase   Result Value Ref Range    Lipase 35 13 - 60 U/L       RADIOLOGY:  Interpreted by Radiologist.  CT HEAD WO CONTRAST   Final Result   1. No acute intracranial hemorrhage or edema. 2.  No cervical spine fracture or malalignment. CT CERVICAL SPINE WO CONTRAST   Final Result   1. No acute intracranial hemorrhage or edema.       2.  No cervical spine fracture or malalignment.             ----------------- NURSING NOTES AND VITALS REVIEWED ---------------   The nursing notes within the ED encounter and vital signs as below have been reviewed. BP (!) 145/93   Pulse 80   Temp 97.2 °F (36.2 °C) (Temporal)   Resp 16   Ht 5' (1.524 m)   Wt 95 lb (43.1 kg)   SpO2 97%   BMI 18.55 kg/m²   Oxygen Saturation Interpretation: Normal      --------------------------------PHYSICAL EXAM------------------------------------      Constitutional/General: Alert and oriented x3, thin, chronically ill appearing. Head: NC/AT  Eyes: PERRL, EOMI  Mouth: Oropharynx clear, handling secretions, no trismus  Neck: Supple, full ROM, no meningeal signs. Mild diffuse posterior cervical tenderness. Diffuse and nonfocal.     Pulmonary: Lungs clear to auscultation bilaterally, no wheezes, rales, or rhonchi. Not in respiratory distress  Cardiovascular:  Regular rate and rhythm, no murmurs, gallops, or rubs. 2+ distal pulses  Abdomen: Soft, + BS. No distension. Nontender. No palpable rigidity, rebound or guarding  Extremities: Moves all extremities x 4. Warm and well perfused  Skin: warm and dry without rash  Neurologic: GCS 15,  Intact. No focal deficits  Psych: Normal Affect      ------------------------ ED COURSE/MEDICAL DECISION MAKING----------------------  Medications   oxyCODONE-acetaminophen (PERCOCET) 5-325 MG per tablet 1 tablet (1 tablet Oral Given 7/31/22 1057)         Medical Decision Making:    CT scan of the head and cervical spine were unremarkable. No acute bleed or bony injury. I did give the patient a Percocet here for pain. I reviewed the labs from her last hospitalization a week ago. Repeat CBC and chemistry and lipase were all done. Her lipase is down now to within normal limits. There is a slight bump again in her BUN and creatinine. I do suggest that she discuss this with Dr. Serra tomorrow. Again the patient tells me she has an appointment already with her PCP in the morning.   I also advised her that she needs to discuss her recent CAT scan of the abdomen pelvis with Dr. Cara Barrera as it demonstrates some thickening in the stomach and they are suggesting an EGD. Both of these issues need followed up. I put strict instructions on her discharge papers and asked her to take these to Dr. Jennifer Boateng in the a.m. Patient is aware and agreeable to this plan. Her  is notified as well. Counseling: The emergency provider has spoken with the patient and spouse/SO and discussed todays results, in addition to providing specific details for the plan of care and counseling regarding the diagnosis and prognosis. Questions are answered at this time and they are agreeable with the plan.      ------------------------ IMPRESSION AND DISPOSITION -------------------------------    IMPRESSION  1. Neck pain    2. Fall from bed, initial encounter    3.  Nausea and vomiting, intractability of vomiting not specified, unspecified vomiting type        DISPOSITION  Disposition: Discharge to home  Patient condition is stable                   Danika Powell PA-C  07/31/22 [id number]

## 2022-08-02 ENCOUNTER — TELEPHONE (OUTPATIENT)
Dept: CARDIOLOGY | Age: 67
End: 2022-08-02

## 2022-08-02 NOTE — TELEPHONE ENCOUNTER
SPOKE WITH PATIENT'S  AND HE STATES THAT SHE HAS BEEN IN AND OUT OF THE HOSPITAL AND IS WEAK AT THIS TIME. HE STATED THAT HER PROBLEMS ARE NOT CARDIAC RELATED. INSTRUCTED TO CALL DR. MENDOZA'S OFFICE IF SHE NEED FURTHER TESTING. PATIENT'S  RELAYED THAT HE UNDERSTOOD.

## 2022-08-03 ENCOUNTER — APPOINTMENT (OUTPATIENT)
Dept: CT IMAGING | Age: 67
DRG: 683 | End: 2022-08-03
Payer: COMMERCIAL

## 2022-08-03 ENCOUNTER — APPOINTMENT (OUTPATIENT)
Dept: GENERAL RADIOLOGY | Age: 67
DRG: 683 | End: 2022-08-03
Payer: COMMERCIAL

## 2022-08-03 ENCOUNTER — HOSPITAL ENCOUNTER (INPATIENT)
Age: 67
LOS: 5 days | Discharge: NURSING, REHAB OR LONG-TERM CARE | DRG: 683 | End: 2022-08-17
Attending: EMERGENCY MEDICINE | Admitting: INTERNAL MEDICINE
Payer: COMMERCIAL

## 2022-08-03 DIAGNOSIS — R53.83 OTHER FATIGUE: ICD-10-CM

## 2022-08-03 DIAGNOSIS — G40.909 SEIZURE DISORDER (HCC): ICD-10-CM

## 2022-08-03 DIAGNOSIS — N17.9 AKI (ACUTE KIDNEY INJURY) (HCC): Primary | ICD-10-CM

## 2022-08-03 LAB
ACANTHOCYTES: ABNORMAL
ALBUMIN SERPL-MCNC: 3.6 G/DL (ref 3.5–5.2)
ALP BLD-CCNC: 80 U/L (ref 35–104)
ALT SERPL-CCNC: 10 U/L (ref 0–32)
AMORPHOUS: ABNORMAL
ANION GAP SERPL CALCULATED.3IONS-SCNC: 16 MMOL/L (ref 7–16)
ANISOCYTOSIS: ABNORMAL
AST SERPL-CCNC: 19 U/L (ref 0–31)
BACTERIA: ABNORMAL /HPF
BASOPHILS ABSOLUTE: 0 E9/L (ref 0–0.2)
BASOPHILS RELATIVE PERCENT: 0 % (ref 0–2)
BILIRUB SERPL-MCNC: 0.3 MG/DL (ref 0–1.2)
BILIRUBIN DIRECT: <0.2 MG/DL (ref 0–0.3)
BILIRUBIN URINE: NEGATIVE
BILIRUBIN, INDIRECT: NORMAL MG/DL (ref 0–1)
BLOOD, URINE: NEGATIVE
BUN BLDV-MCNC: 65 MG/DL (ref 6–23)
BURR CELLS: ABNORMAL
CALCIUM SERPL-MCNC: 9.2 MG/DL (ref 8.6–10.2)
CHLORIDE BLD-SCNC: 94 MMOL/L (ref 98–107)
CHP ED QC CHECK: NORMAL
CLARITY: CLEAR
CO2: 22 MMOL/L (ref 22–29)
COLOR: YELLOW
CREAT SERPL-MCNC: 2.4 MG/DL (ref 0.5–1)
EOSINOPHILS ABSOLUTE: 0.07 E9/L (ref 0.05–0.5)
EOSINOPHILS RELATIVE PERCENT: 1.7 % (ref 0–6)
GFR AFRICAN AMERICAN: 24
GFR NON-AFRICAN AMERICAN: 20 ML/MIN/1.73
GLUCOSE BLD-MCNC: 211 MG/DL (ref 74–99)
GLUCOSE BLD-MCNC: 233 MG/DL
GLUCOSE URINE: NEGATIVE MG/DL
HCT VFR BLD CALC: 35.3 % (ref 34–48)
HEMOGLOBIN: 11.5 G/DL (ref 11.5–15.5)
KETONES, URINE: NEGATIVE MG/DL
LACTIC ACID, SEPSIS: 1.3 MMOL/L (ref 0.5–1.9)
LEUKOCYTE ESTERASE, URINE: NEGATIVE
LYMPHOCYTES ABSOLUTE: 0.53 E9/L (ref 1.5–4)
LYMPHOCYTES RELATIVE PERCENT: 13.1 % (ref 20–42)
MCH RBC QN AUTO: 33.9 PG (ref 26–35)
MCHC RBC AUTO-ENTMCNC: 32.6 % (ref 32–34.5)
MCV RBC AUTO: 104.1 FL (ref 80–99.9)
MONOCYTES ABSOLUTE: 0.12 E9/L (ref 0.1–0.95)
MONOCYTES RELATIVE PERCENT: 2.6 % (ref 2–12)
NEUTROPHILS ABSOLUTE: 3.4 E9/L (ref 1.8–7.3)
NEUTROPHILS RELATIVE PERCENT: 82.6 % (ref 43–80)
NITRITE, URINE: NEGATIVE
NUCLEATED RED BLOOD CELLS: 0 /100 WBC
OVALOCYTES: ABNORMAL
PDW BLD-RTO: 12.5 FL (ref 11.5–15)
PH UA: 5 (ref 5–9)
PLATELET # BLD: 217 E9/L (ref 130–450)
PMV BLD AUTO: 9.8 FL (ref 7–12)
POIKILOCYTES: ABNORMAL
POLYCHROMASIA: ABNORMAL
POTASSIUM SERPL-SCNC: 4.9 MMOL/L (ref 3.5–5)
PROTEIN UA: 30 MG/DL
RBC # BLD: 3.39 E12/L (ref 3.5–5.5)
RBC UA: ABNORMAL /HPF (ref 0–2)
ROULEAUX: ABNORMAL
SARS-COV-2, NAAT: NOT DETECTED
SODIUM BLD-SCNC: 132 MMOL/L (ref 132–146)
SPECIFIC GRAVITY UA: 1.02 (ref 1–1.03)
TOTAL PROTEIN: 7 G/DL (ref 6.4–8.3)
UROBILINOGEN, URINE: 0.2 E.U./DL
WBC # BLD: 4.1 E9/L (ref 4.5–11.5)
WBC UA: ABNORMAL /HPF (ref 0–5)

## 2022-08-03 PROCEDURE — 81001 URINALYSIS AUTO W/SCOPE: CPT

## 2022-08-03 PROCEDURE — 70450 CT HEAD/BRAIN W/O DYE: CPT

## 2022-08-03 PROCEDURE — 96361 HYDRATE IV INFUSION ADD-ON: CPT

## 2022-08-03 PROCEDURE — P9612 CATHETERIZE FOR URINE SPEC: HCPCS

## 2022-08-03 PROCEDURE — 71045 X-RAY EXAM CHEST 1 VIEW: CPT

## 2022-08-03 PROCEDURE — 93005 ELECTROCARDIOGRAM TRACING: CPT | Performed by: EMERGENCY MEDICINE

## 2022-08-03 PROCEDURE — 2580000003 HC RX 258: Performed by: EMERGENCY MEDICINE

## 2022-08-03 PROCEDURE — 87040 BLOOD CULTURE FOR BACTERIA: CPT

## 2022-08-03 PROCEDURE — 85025 COMPLETE CBC W/AUTO DIFF WBC: CPT

## 2022-08-03 PROCEDURE — 80048 BASIC METABOLIC PNL TOTAL CA: CPT

## 2022-08-03 PROCEDURE — 96360 HYDRATION IV INFUSION INIT: CPT

## 2022-08-03 PROCEDURE — 83605 ASSAY OF LACTIC ACID: CPT

## 2022-08-03 PROCEDURE — 87635 SARS-COV-2 COVID-19 AMP PRB: CPT

## 2022-08-03 PROCEDURE — 99285 EMERGENCY DEPT VISIT HI MDM: CPT

## 2022-08-03 PROCEDURE — 80076 HEPATIC FUNCTION PANEL: CPT

## 2022-08-03 RX ORDER — SODIUM CHLORIDE 0.9 % (FLUSH) 0.9 %
5-40 SYRINGE (ML) INJECTION PRN
Status: DISCONTINUED | OUTPATIENT
Start: 2022-08-03 | End: 2022-08-17 | Stop reason: HOSPADM

## 2022-08-03 RX ORDER — SODIUM CHLORIDE 9 MG/ML
INJECTION, SOLUTION INTRAVENOUS PRN
Status: DISCONTINUED | OUTPATIENT
Start: 2022-08-03 | End: 2022-08-17 | Stop reason: HOSPADM

## 2022-08-03 RX ORDER — SODIUM CHLORIDE 0.9 % (FLUSH) 0.9 %
10 SYRINGE (ML) INJECTION PRN
Status: DISCONTINUED | OUTPATIENT
Start: 2022-08-03 | End: 2022-08-17 | Stop reason: HOSPADM

## 2022-08-03 RX ORDER — SODIUM CHLORIDE 0.9 % (FLUSH) 0.9 %
5-40 SYRINGE (ML) INJECTION EVERY 12 HOURS SCHEDULED
Status: DISCONTINUED | OUTPATIENT
Start: 2022-08-04 | End: 2022-08-17 | Stop reason: HOSPADM

## 2022-08-03 RX ORDER — 0.9 % SODIUM CHLORIDE 0.9 %
1000 INTRAVENOUS SOLUTION INTRAVENOUS ONCE
Status: COMPLETED | OUTPATIENT
Start: 2022-08-03 | End: 2022-08-03

## 2022-08-03 RX ORDER — ACETAMINOPHEN 325 MG/1
650 TABLET ORAL EVERY 4 HOURS PRN
Status: DISCONTINUED | OUTPATIENT
Start: 2022-08-03 | End: 2022-08-04 | Stop reason: DRUGHIGH

## 2022-08-03 RX ORDER — ONDANSETRON 4 MG/1
4 TABLET, ORALLY DISINTEGRATING ORAL EVERY 8 HOURS PRN
Status: DISCONTINUED | OUTPATIENT
Start: 2022-08-03 | End: 2022-08-04 | Stop reason: ALTCHOICE

## 2022-08-03 RX ORDER — HEPARIN SODIUM 10000 [USP'U]/ML
5000 INJECTION, SOLUTION INTRAVENOUS; SUBCUTANEOUS 2 TIMES DAILY
Status: DISCONTINUED | OUTPATIENT
Start: 2022-08-04 | End: 2022-08-17 | Stop reason: HOSPADM

## 2022-08-03 RX ORDER — ONDANSETRON 2 MG/ML
4 INJECTION INTRAMUSCULAR; INTRAVENOUS EVERY 6 HOURS PRN
Status: DISCONTINUED | OUTPATIENT
Start: 2022-08-03 | End: 2022-08-17 | Stop reason: HOSPADM

## 2022-08-03 RX ORDER — SODIUM CHLORIDE 9 MG/ML
INJECTION, SOLUTION INTRAVENOUS CONTINUOUS
Status: DISCONTINUED | OUTPATIENT
Start: 2022-08-04 | End: 2022-08-15

## 2022-08-03 RX ADMIN — SODIUM CHLORIDE 1000 ML: 9 INJECTION, SOLUTION INTRAVENOUS at 21:42

## 2022-08-03 ASSESSMENT — ENCOUNTER SYMPTOMS
DIARRHEA: 0
ABDOMINAL PAIN: 0
HEMATOCHEZIA: 0
SHORTNESS OF BREATH: 0
VISUAL CHANGE: 0
NAUSEA: 0
VOMITING: 0

## 2022-08-03 NOTE — LETTER
[place] Department Medicaid  CERTIFICATION OF NECESSITY  FOR NON-EMERGENCY TRANSPORTATION   BY GROUND AMBULANCE      Individual Information   1. Name: Teena Argueta 2. [place] Medicaid Billing Number:    3. Address: [address] [address]      Transportation Provider Information   4. Provider Name:    5. [place] Medicaid Provider Number: National Provider Identifier (NPI):      Certification  7. Criteria:  During transport, this individual requires:  [x] Medical treatment or continuous     supervision by an EMT. [] The administration or regulation of oxygen by another person. [] Supervised protective restraint. 8. Period Beginning Date:    5. Length  [x] Not more than 1 day(s)  [] One Year     Additional Information Relevant to Certification   10. Comments or Explanations, If Necessary or Appropriate   Seizure disorder, s/p CVA, Osteoporosis. Certifying Practitioner Information   11. Name of Practitioner:    12. [place] Medicaid Provider Number, If Applicable:  [address] Provider Identifier (NPI):      Signature Information   14. Date of Signature: 8/15/2022   15. Name of Person Signing: Electronically signed by Ed Vasquez RN on 8/15/2022 at 3:41 PM     16.  Signature and Professional Designation: Electronically signed by Ed Vasquez RN on 8/15/2022 at 3:41 PM  Discharge Planner     [place] [id number]  Rev. 7/2015

## 2022-08-04 PROBLEM — E44.0 MODERATE PROTEIN-CALORIE MALNUTRITION (HCC): Status: ACTIVE | Noted: 2022-08-04

## 2022-08-04 LAB
ANION GAP SERPL CALCULATED.3IONS-SCNC: 17 MMOL/L (ref 7–16)
ANION GAP SERPL CALCULATED.3IONS-SCNC: 19 MMOL/L (ref 7–16)
BACTERIA: ABNORMAL /HPF
BILIRUBIN URINE: NEGATIVE
BLOOD, URINE: ABNORMAL
BUN BLDV-MCNC: 60 MG/DL (ref 6–23)
BUN BLDV-MCNC: 61 MG/DL (ref 6–23)
CALCIUM SERPL-MCNC: 8.5 MG/DL (ref 8.6–10.2)
CALCIUM SERPL-MCNC: 8.7 MG/DL (ref 8.6–10.2)
CHLORIDE BLD-SCNC: 96 MMOL/L (ref 98–107)
CHLORIDE BLD-SCNC: 97 MMOL/L (ref 98–107)
CLARITY: CLEAR
CO2: 17 MMOL/L (ref 22–29)
CO2: 18 MMOL/L (ref 22–29)
COLOR: YELLOW
CREAT SERPL-MCNC: 1.8 MG/DL (ref 0.5–1)
CREAT SERPL-MCNC: 2.1 MG/DL (ref 0.5–1)
EKG ATRIAL RATE: 75 BPM
EKG P AXIS: 44 DEGREES
EKG P-R INTERVAL: 180 MS
EKG Q-T INTERVAL: 412 MS
EKG QRS DURATION: 90 MS
EKG QTC CALCULATION (BAZETT): 460 MS
EKG R AXIS: 33 DEGREES
EKG T AXIS: -7 DEGREES
EKG VENTRICULAR RATE: 75 BPM
FOLATE: 3.1 NG/ML (ref 4.8–24.2)
GFR AFRICAN AMERICAN: 29
GFR AFRICAN AMERICAN: 34
GFR NON-AFRICAN AMERICAN: 24 ML/MIN/1.73
GFR NON-AFRICAN AMERICAN: 28 ML/MIN/1.73
GLUCOSE BLD-MCNC: 126 MG/DL (ref 74–99)
GLUCOSE BLD-MCNC: 128 MG/DL (ref 74–99)
GLUCOSE URINE: NEGATIVE MG/DL
HCT VFR BLD CALC: 32.3 % (ref 34–48)
HEMOGLOBIN: 10.8 G/DL (ref 11.5–15.5)
KETONES, URINE: ABNORMAL MG/DL
LEUKOCYTE ESTERASE, URINE: NEGATIVE
MCH RBC QN AUTO: 34.6 PG (ref 26–35)
MCHC RBC AUTO-ENTMCNC: 33.4 % (ref 32–34.5)
MCV RBC AUTO: 103.5 FL (ref 80–99.9)
NITRITE, URINE: NEGATIVE
PDW BLD-RTO: 12.6 FL (ref 11.5–15)
PH UA: 6 (ref 5–9)
PLATELET # BLD: 225 E9/L (ref 130–450)
PMV BLD AUTO: 9.7 FL (ref 7–12)
POTASSIUM SERPL-SCNC: 3.9 MMOL/L (ref 3.5–5)
POTASSIUM SERPL-SCNC: 4.8 MMOL/L (ref 3.5–5)
PROTEIN UA: ABNORMAL MG/DL
RBC # BLD: 3.12 E12/L (ref 3.5–5.5)
RBC UA: ABNORMAL /HPF (ref 0–2)
SODIUM BLD-SCNC: 132 MMOL/L (ref 132–146)
SODIUM BLD-SCNC: 132 MMOL/L (ref 132–146)
SPECIFIC GRAVITY UA: 1.02 (ref 1–1.03)
UROBILINOGEN, URINE: 0.2 E.U./DL
VITAMIN B-12: 566 PG/ML (ref 211–946)
WBC # BLD: 6 E9/L (ref 4.5–11.5)
WBC UA: ABNORMAL /HPF (ref 0–5)

## 2022-08-04 PROCEDURE — G0378 HOSPITAL OBSERVATION PER HR: HCPCS

## 2022-08-04 PROCEDURE — 6360000002 HC RX W HCPCS: Performed by: INTERNAL MEDICINE

## 2022-08-04 PROCEDURE — 2580000003 HC RX 258: Performed by: INTERNAL MEDICINE

## 2022-08-04 PROCEDURE — 82746 ASSAY OF FOLIC ACID SERUM: CPT

## 2022-08-04 PROCEDURE — 6370000000 HC RX 637 (ALT 250 FOR IP): Performed by: INTERNAL MEDICINE

## 2022-08-04 PROCEDURE — 85027 COMPLETE CBC AUTOMATED: CPT

## 2022-08-04 PROCEDURE — 97165 OT EVAL LOW COMPLEX 30 MIN: CPT

## 2022-08-04 PROCEDURE — 36415 COLL VENOUS BLD VENIPUNCTURE: CPT

## 2022-08-04 PROCEDURE — 2580000003 HC RX 258: Performed by: EMERGENCY MEDICINE

## 2022-08-04 PROCEDURE — 81001 URINALYSIS AUTO W/SCOPE: CPT

## 2022-08-04 PROCEDURE — 97161 PT EVAL LOW COMPLEX 20 MIN: CPT

## 2022-08-04 PROCEDURE — 82607 VITAMIN B-12: CPT

## 2022-08-04 PROCEDURE — 80048 BASIC METABOLIC PNL TOTAL CA: CPT

## 2022-08-04 PROCEDURE — 87088 URINE BACTERIA CULTURE: CPT

## 2022-08-04 PROCEDURE — 96374 THER/PROPH/DIAG INJ IV PUSH: CPT

## 2022-08-04 PROCEDURE — 96361 HYDRATE IV INFUSION ADD-ON: CPT

## 2022-08-04 PROCEDURE — 96372 THER/PROPH/DIAG INJ SC/IM: CPT

## 2022-08-04 RX ORDER — LEVETIRACETAM 100 MG/ML
1500 SOLUTION ORAL 2 TIMES DAILY
Status: DISCONTINUED | OUTPATIENT
Start: 2022-08-04 | End: 2022-08-15

## 2022-08-04 RX ORDER — LACOSAMIDE 100 MG/1
200 TABLET ORAL EVERY MORNING
Status: DISCONTINUED | OUTPATIENT
Start: 2022-08-04 | End: 2022-08-04

## 2022-08-04 RX ORDER — FOLIC ACID 1 MG/1
1 TABLET ORAL DAILY
Status: DISCONTINUED | OUTPATIENT
Start: 2022-08-04 | End: 2022-08-17 | Stop reason: HOSPADM

## 2022-08-04 RX ORDER — LACOSAMIDE 50 MG/1
50 TABLET ORAL
Status: DISCONTINUED | OUTPATIENT
Start: 2022-08-04 | End: 2022-08-04

## 2022-08-04 RX ORDER — ONDANSETRON 4 MG/1
4 TABLET, FILM COATED ORAL EVERY 8 HOURS PRN
Status: DISCONTINUED | OUTPATIENT
Start: 2022-08-04 | End: 2022-08-17 | Stop reason: HOSPADM

## 2022-08-04 RX ORDER — CARVEDILOL 6.25 MG/1
6.25 TABLET ORAL 2 TIMES DAILY WITH MEALS
Status: DISCONTINUED | OUTPATIENT
Start: 2022-08-04 | End: 2022-08-17 | Stop reason: HOSPADM

## 2022-08-04 RX ORDER — FAMOTIDINE 20 MG/1
10 TABLET, FILM COATED ORAL DAILY
Status: DISCONTINUED | OUTPATIENT
Start: 2022-08-04 | End: 2022-08-17 | Stop reason: HOSPADM

## 2022-08-04 RX ORDER — PRIMIDONE 50 MG/1
150 TABLET ORAL 2 TIMES DAILY
Status: DISCONTINUED | OUTPATIENT
Start: 2022-08-04 | End: 2022-08-17 | Stop reason: HOSPADM

## 2022-08-04 RX ORDER — AMLODIPINE BESYLATE 10 MG/1
10 TABLET ORAL EVERY MORNING
Status: DISCONTINUED | OUTPATIENT
Start: 2022-08-04 | End: 2022-08-17 | Stop reason: HOSPADM

## 2022-08-04 RX ORDER — PREDNISONE 1 MG/1
5 TABLET ORAL EVERY MORNING
Status: DISCONTINUED | OUTPATIENT
Start: 2022-08-04 | End: 2022-08-15

## 2022-08-04 RX ORDER — ACETAMINOPHEN 325 MG/1
650 TABLET ORAL EVERY 6 HOURS PRN
Status: DISCONTINUED | OUTPATIENT
Start: 2022-08-04 | End: 2022-08-17 | Stop reason: HOSPADM

## 2022-08-04 RX ORDER — TACROLIMUS 1 MG/1
3 CAPSULE ORAL DAILY
Status: DISCONTINUED | OUTPATIENT
Start: 2022-08-04 | End: 2022-08-17 | Stop reason: HOSPADM

## 2022-08-04 RX ADMIN — Medication 10 ML: at 08:09

## 2022-08-04 RX ADMIN — HEPARIN SODIUM 5000 UNITS: 10000 INJECTION INTRAVENOUS; SUBCUTANEOUS at 02:10

## 2022-08-04 RX ADMIN — SODIUM CHLORIDE: 9 INJECTION, SOLUTION INTRAVENOUS at 11:55

## 2022-08-04 RX ADMIN — PRIMIDONE 150 MG: 50 TABLET ORAL at 20:11

## 2022-08-04 RX ADMIN — PREDNISONE 5 MG: 5 TABLET ORAL at 08:05

## 2022-08-04 RX ADMIN — LACOSAMIDE 150 MG: 100 TABLET, FILM COATED ORAL at 08:05

## 2022-08-04 RX ADMIN — CARVEDILOL 6.25 MG: 6.25 TABLET, FILM COATED ORAL at 16:22

## 2022-08-04 RX ADMIN — TACROLIMUS 3 MG: 1 CAPSULE ORAL at 08:06

## 2022-08-04 RX ADMIN — FAMOTIDINE 10 MG: 20 TABLET ORAL at 08:06

## 2022-08-04 RX ADMIN — LACOSAMIDE 150 MG: 100 TABLET, FILM COATED ORAL at 20:10

## 2022-08-04 RX ADMIN — SODIUM CHLORIDE: 9 INJECTION, SOLUTION INTRAVENOUS at 02:10

## 2022-08-04 RX ADMIN — AMLODIPINE BESYLATE 10 MG: 10 TABLET ORAL at 08:06

## 2022-08-04 RX ADMIN — Medication 10 ML: at 20:11

## 2022-08-04 RX ADMIN — HEPARIN SODIUM 5000 UNITS: 10000 INJECTION INTRAVENOUS; SUBCUTANEOUS at 20:11

## 2022-08-04 RX ADMIN — FOLIC ACID 1 MG: 1 TABLET ORAL at 15:35

## 2022-08-04 RX ADMIN — HEPARIN SODIUM 5000 UNITS: 10000 INJECTION INTRAVENOUS; SUBCUTANEOUS at 08:06

## 2022-08-04 RX ADMIN — LACOSAMIDE 150 MG: 100 TABLET, FILM COATED ORAL at 13:42

## 2022-08-04 RX ADMIN — LEVETIRACETAM 1500 MG: 100 SOLUTION ORAL at 08:07

## 2022-08-04 RX ADMIN — PRIMIDONE 150 MG: 50 TABLET ORAL at 08:06

## 2022-08-04 RX ADMIN — LEVETIRACETAM 1500 MG: 100 SOLUTION ORAL at 20:10

## 2022-08-04 RX ADMIN — CARVEDILOL 6.25 MG: 6.25 TABLET, FILM COATED ORAL at 08:05

## 2022-08-04 RX ADMIN — WATER 1000 MG: 1 INJECTION INTRAMUSCULAR; INTRAVENOUS; SUBCUTANEOUS at 08:12

## 2022-08-04 ASSESSMENT — PAIN SCALES - GENERAL
PAINLEVEL_OUTOF10: 0
PAINLEVEL_OUTOF10: 0

## 2022-08-04 NOTE — CARE COORDINATION
Transition of Care-Met with patient and  at bedside. They reside in a [address] [place] [address],4Th Floor style home, bed/bath on 1st floor. Patient owns a WC, [employer], cane, raised toilet seat with handrails and shower chair. Patient scored a 10 with PT, OT to evaluate.  does not think he can manage her care at home until she gets stronger. [facility] SNF list left at bedside to review. Potential transfer to [facility] (1-) for Neuro consultation, awaiting attending plan of care. PC is Dr. Megan Resendez, preferred pharmacy is [facility] on [address]. SW/CM following.     Tamera Lanes BSN, RN  [facility]

## 2022-08-04 NOTE — PROGRESS NOTES
Occupational Therapy    OCCUPATIONAL THERAPY INITIAL EVALUATION     [address] [facility] [place], [address]                                                  Patient Name: Eduar Amador    MRN: [id number]    : 1955    Room: [facility]      Evaluating OT: Liv Stewart OTR/L   [id number]      Referring Eduardo Coleman MD    Specific Provider Orders/Date:OT eval and treat 2022      Diagnosis:  TRACI (acute kidney injury) ([facility] [N17.9]  Other fatigue [R53.83]     Pertinent Medical History: hemiplegia, dialysis, CHF, falls, osteoporosis , back surgery, CVA, seizure     Precautions:  Fall Risk, alarm      Assessment of current deficits    [x] Functional mobility  [x]ADLs  [x] Strength               [x]Cognition    [x] Functional transfers   [x] IADLs         [x] Safety Awareness   [x]Endurance    [] Fine Coordination              [x] Balance      [] Vision/perception   []Sensation     []Gross Motor Coordination  [] ROM  [] Delirium                   [] Motor Control     OT PLAN OF CARE   OT POC based on physician orders, patient diagnosis and results of clinical assessment    Frequency/Duration  2-4 days/wk for 2 weeks PRN   Specific OT Treatment Interventions to include:   ADL retraining/adapted techniques and AE recommendations to increase functional independence within precautions                    Energy conservation techniques to improve tolerance for selfcare routine   Functional transfer/mobility training/DME recommendations for increased independence, safety and fall prevention         Patient/family education to increase safety and functional independence             Environmental modifications for safe mobility and completion of ADLs                             Therapeutic activity to improve functional performance during ADLs.                                          Therapeutic exercise to improve tolerance and functional strength for ADLs    Balance retraining/tolerance tasks for facilitation of postural control with dynamic challenges during ADLs . Positioning to improve functional independence  Cognitive retraining ex's to improve problem solving skills & safe participation in ADLs/IADLs         Recommended Adaptive Equipment: TBD     Home Living: Pt lives with , 1 story with ramp    Bathroom setup: tub/shower . Tub bench, elevated toilet seat    Equipment owned: walker , wheelchair, raised toilet seat     Prior Level of Function: recent assist as  with ADLs , assist  with IADLs; ambulated with walker       Pain Level: no pain ;   Cognition: A&O to person, unable to recall time. Increase time to respond to questions. Memory:  impaired   Sequencing:  fair-   Problem solving:  fair-   Judgement/safety:  fair-     Functional Assessment:  AM-PAC Daily Activity Raw Score: 11/24   Initial Eval Status  Date: 8/4/22 Treatment Status  Date: STGs = [facility]  Time frame: 10-14 days   Feeding Min A  Supervision    Grooming Mod A,seated   Decrease standing balance/tolerance     Patient cued to lift her head up - neck flexed position -  reports this has been patient's position more recently   SBA    UB Dressing Max A   SBA    LB Dressing Max A   Min A   Bathing Max A   Min A    Toileting Max A   Min A    Bed Mobility  Max A  Supine <>sit   CGA   Functional Transfers Max A  Sit - stand from bed   Min A    Functional Mobility Attempted side steps to [facility]   Max A using walket  Patient fatigued , feeling alittle dizzy - returned to bed   Min A with good tolerance    Balance Sitting:     Static:  Min A- neck flexed     Dynamic:Max  A  Standing:  Max A   SBA - sitting   Min/CGA - standing    Activity Tolerance Poor with light activity   No SOB noted   Fair + with ADL activity    Visual/  Perceptual Glasses: yes         UE ROM/strength  UE ROM present throughout UES   Weakness throughout   Tolerate UE ROM therapeutic activity/exercises to increase strength and endurance for ADL/xfer activity      Hand Dominance right    Hearing: WFL   Sensation:  No c/o numbness or tingling   Tone: WFL   Edema: none observed     Comments: Upon arrival patient lying in bed . At end of session, patient returned to bed  with call light and phone within reach, all lines and tubes intact. *ALARM ON,  present in room     Overall patient demonstrated  decreased independence and safety during completion of ADL/functional transfer/mobility tasks. Pt would benefit from continued skilled OT to increase safety and independence with completion of ADL/IADL tasks for functional independence and quality of life. Rehab Potential: good for established goals     Patient / Family Goal: none stated       Patient and/or family were instructed on functional diagnosis, prognosis/goals and OT plan of care. Demonstrated fair -  understanding by patient      Eval Complexity: Low    Time In: 0932  Time Out: 0945      Min Units   OT Eval Low 97165 x  1   OT Eval Medium 90757      OT Eval High 05325      OT Re-Eval [id number]       Therapeutic Ex [phone]      Therapeutic Activities 32031       ADL/Self Care 21666       Orthotic Management 02212       Manual [id number]     Neuro Re-Ed [id number]       Non-Billable Time         Evaluation Time additionally includes thorough review of current medical information, gathering information on past medical history/social history and prior level of function, interpretation of standardized testing/informal observation of tasks, assessment of data and development of plan of care and goals.             Peggy Finger  OTR/L  OT [id number]

## 2022-08-04 NOTE — ED PROVIDER NOTES
82-year-old female history of idiopathic pulmonary fibrosis CHF end-stage renal disease though currently not on dialysis according to EMS presents to the emergency department with fatigue. The patient reportedly was seen recently for fatigue and the discharge she states symptoms have worsened here. EMS there is concern for altered mental status as well the patient is ANO x1 according to nursing staff. There is no chest pain or shortness of breath no belly pain and no other complaints at this time    The history is provided by the patient. Fatigue  Severity:  Moderate  Onset quality:  Gradual  Duration:  3 days  Timing:  Intermittent  Progression:  Waxing and waning  Chronicity:  New  Relieved by:  Nothing  Worsened by:  Nothing  Ineffective treatments:  None tried  Associated symptoms: no abdominal pain, no aphasia, no arthralgias, no ataxia, no chest pain, no diarrhea, no difficulty walking, no dizziness, no dysuria, no fever, no foul-smelling urine, no frequency, no hematochezia, no lethargy, no nausea, no sensory-motor deficit, no shortness of breath, no syncope, no urgency, no vision change and no vomiting       Review of Systems   Unable to perform ROS: Acuity of condition   Constitutional:  Positive for fatigue. Negative for fever. Respiratory:  Negative for shortness of breath. Cardiovascular:  Negative for chest pain and syncope. Gastrointestinal:  Negative for abdominal pain, diarrhea, hematochezia, nausea and vomiting. Genitourinary:  Negative for dysuria, frequency and urgency. Musculoskeletal:  Negative for arthralgias. Neurological:  Negative for dizziness. Psychiatric/Behavioral:  Positive for confusion. Physical Exam  Constitutional:       Appearance: Normal appearance. HENT:      Head: Normocephalic and atraumatic. Nose: Nose normal.      Mouth/Throat:      Mouth: Mucous membranes are moist.   Eyes:      Extraocular Movements: Extraocular movements intact. Pupils: Pupils are equal, round, and reactive to light. Cardiovascular:      Rate and Rhythm: Normal rate and regular rhythm. Pulmonary:      Effort: Pulmonary effort is normal.      Breath sounds: Normal breath sounds. Abdominal:      General: Abdomen is flat. Bowel sounds are normal. There is no distension. Palpations: Abdomen is soft. Tenderness: There is no abdominal tenderness. There is no guarding. Musculoskeletal:      Cervical back: Normal range of motion and neck supple. Skin:     General: Skin is warm. Capillary Refill: Capillary refill takes less than 2 seconds. Neurological:      Mental Status: She is alert. She is disoriented. Procedures     MDM  Number of Diagnoses or Management Options  Diagnosis management comments: Patient was seen and examined. Labs and imaging were ordered. Work-up reveals dehydration no clear etiology for otherwise fatigue symptoms. I did discuss the patient's work-up with Dr. Pineda Garcia the patient's PCP who agreed admit her for dehydration.              --------------------------------------------- PAST HISTORY ---------------------------------------------  Past Medical History:  has a past medical history of Acute on chronic respiratory failure with hypoxia ([address] Anemia, Cardiomegaly, CHF (congestive heart failure) ([address] Chronic kidney disease, Constipation, Diaphragmatic hernia, Diverticulitis, Epilepsy ([address] Falls, GERD (gastroesophageal reflux disease), Hemiparesis ([address] Hemiplegia ([address] Hemodialysis patient ([address] History of blood transfusion, History of lung transplant ([address] Hyperparathyroidism ([address] Hypertension, Immunosuppressed status ([address] Insomnia, IPF (idiopathic pulmonary fibrosis) ([address] Kidney stone, Neutropenia ([address] 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     Past Surgical History:  has a past surgical history that includes Lung transplant (2003); hip surgery; knee surgery; hernia repair; Cholecystectomy; Fixation Kyphoplasty (11/3/2015); Leg Surgery (Left, 12/06/2016); Dialysis fistula creation (Right, 04/27/2017); back surgery; AV fistula repair (Right, 08/17/2017); Colonoscopy; other surgical history (11/14/2017); fracture surgery (Left, 12/2014); joint replacement (Bilateral); joint replacement (Left); and pr anastomosis,av,any site (Right, 9/28/2018). Social History:  reports that she has never smoked. She has never used smokeless tobacco. She reports that she does not drink alcohol and does not use drugs. Family History: Family history is unknown by patient. The patients home medications have been reviewed.     Allergies: Ambien [zolpidem tartrate], Klonopin [clonazepam], Lorazepam, and Eszopiclone    -------------------------------------------------- RESULTS -------------------------------------------------    LABS:  Results for orders placed or performed during the hospital encounter of 08/03/22   COVID-19, Rapid    Specimen: Nasopharyngeal Swab   Result Value Ref Range    SARS-CoV-2, NAAT Not Detected Not Detected   CBC with Auto Differential   Result Value Ref Range    WBC 4.1 (L) 4.5 - 11.5 E9/L    RBC 3.39 (L) 3.50 - 5.50 E12/L    Hemoglobin 11.5 11.5 - 15.5 g/dL    Hematocrit 35.3 34.0 - 48.0 %    .1 (H) 80.0 - 99.9 fL    MCH 33.9 26.0 - 35.0 pg    MCHC 32.6 32.0 - 34.5 %    RDW 12.5 11.5 - 15.0 fL    Platelets 315 922 - 286 E9/L    MPV 9.8 7.0 - 12.0 fL    Neutrophils % 82.6 (H) 43.0 - 80.0 %    Lymphocytes % 13.1 (L) 20.0 - 42.0 %    Monocytes % 2.6 2.0 - 12.0 %    Eosinophils % 1.7 0.0 - 6.0 %    Basophils % 0.0 0.0 - 2.0 %    Neutrophils Absolute 3.40 1.80 - 7.30 E9/L    Lymphocytes Absolute 0.53 (L) 1.50 - 4.00 E9/L    Monocytes Absolute 0.12 0.10 - 0.95 E9/L    Eosinophils Absolute 0.07 0.05 - 0.50 E9/L    Basophils Absolute 0.00 0.00 - 0.20 E9/L    nRBC 0.0 /100 WBC Anisocytosis 1+     Polychromasia 2+     Poikilocytes 1+     Acanthocytes 2+     Norman Cells 1+     Ovalocytes 2+     Rouleaux 1+    Basic Metabolic Panel   Result Value Ref Range    Sodium 132 132 - 146 mmol/L    Potassium 4.9 3.5 - 5.0 mmol/L    Chloride 94 (L) 98 - 107 mmol/L    CO2 22 22 - 29 mmol/L    Anion Gap 16 7 - 16 mmol/L    Glucose 211 (H) 74 - 99 mg/dL    BUN 65 (H) 6 - 23 mg/dL    Creatinine 2.4 (H) 0.5 - 1.0 mg/dL    GFR Non-African American 20 >=60 mL/min/1.73    GFR African American 24     Calcium 9.2 8.6 - 10.2 mg/dL   Hepatic Function Panel   Result Value Ref Range    Total Protein 7.0 6.4 - 8.3 g/dL    Albumin 3.6 3.5 - 5.2 g/dL    Alkaline Phosphatase 80 35 - 104 U/L    ALT 10 0 - 32 U/L    AST 19 0 - 31 U/L    Total Bilirubin 0.3 0.0 - 1.2 mg/dL    Bilirubin, Direct <0.2 0.0 - 0.3 mg/dL    Bilirubin, Indirect see below 0.0 - 1.0 mg/dL   Lactate, Sepsis   Result Value Ref Range    Lactic Acid, Sepsis 1.3 0.5 - 1.9 mmol/L   Urinalysis   Result Value Ref Range    Color, UA Yellow Straw/Yellow    Clarity, UA Clear Clear    Glucose, Ur Negative Negative mg/dL    Bilirubin Urine Negative Negative    Ketones, Urine Negative Negative mg/dL    Specific Gravity, UA 1.025 1.005 - 1.030    Blood, Urine Negative Negative    pH, UA 5.0 5.0 - 9.0    Protein, UA 30 (A) Negative mg/dL    Urobilinogen, Urine 0.2 <2.0 E.U./dL    Nitrite, Urine Negative Negative    Leukocyte Esterase, Urine Negative Negative   Microscopic Urinalysis   Result Value Ref Range    WBC, UA 1-3 0 - 5 /HPF    RBC, UA 0-1 0 - 2 /HPF    Bacteria, UA MODERATE (A) None Seen /HPF    Amorphous, UA MODERATE    POCT Glucose   Result Value Ref Range    Glucose 233 mg/dL    QC OK?  per EMS    EKG 12 Lead   Result Value Ref Range    Ventricular Rate 75 BPM    Atrial Rate 75 BPM    P-R Interval 180 ms    QRS Duration 90 ms    Q-T Interval 412 ms    QTc Calculation (Bazett) 460 ms    P Axis 44 degrees    R Axis 33 degrees    T Axis -7 degrees RADIOLOGY:  CT ABDOMEN PELVIS WO CONTRAST Additional Contrast? None    Result Date: 7/20/2022  EXAMINATION: CT OF THE ABDOMEN AND PELVIS WITHOUT CONTRAST 7/20/2022 10:52 pm TECHNIQUE: CT of the abdomen and pelvis was performed without the administration of intravenous contrast. Multiplanar reformatted images are provided for review. Automated exposure control, iterative reconstruction, and/or weight based adjustment of the mA/kV was utilized to reduce the radiation dose to as low as reasonably achievable. COMPARISON: CT of the abdomen and pelvis without contrast, 06/07/2022. HISTORY: ORDERING SYSTEM PROVIDED HISTORY: epigastric abdominal pain TECHNOLOGIST PROVIDED HISTORY: Reason for exam:->epigastric abdominal pain Additional Contrast?->None Decision Support Exception - unselect if not a suspected or confirmed emergency medical condition->Emergency Medical Condition (MA) FINDINGS: Lower Chest: Pulmonary opacities in the dependent aspect of the left upper lobe and within the left lower lobe are grossly stable as of 11/18/2016 and likely represent scarring. The heart is mildly enlarged. No pleural or pericardial effusion. Small hiatal hernia. Questionable mural thickening along the gastroesophageal junction. Organs: Liver: Unremarkable. Gallbladder: Unremarkable. Pancreas: Unremarkable. Spleen:  Unremarkable. Adrenals: Unremarkable. Kidneys: The kidneys are bilaterally atrophic. Intrarenal calculi are seen, with the largest along the inferior pole of the left kidney measuring approximately 5 mm. No hydronephrosis. A transplant kidney seen in the right iliac fossa. No stone or hydronephrosis is seen within it. GI/Bowel: Moderate distal colonic diverticulosis without diverticulitis. Pelvis: The urinary bladder and the uterus are obscured by beam hardening artifact from right hip arthroplasty and surgical hardware in the left femur.  Peritoneum/Retroperitoneum: Mild calcified atherosclerosis is seen in the aorta. No aneurysm. Interval enlargement of two left para-aortic lymph nodes, with the larger of the two measuring approximately 2.4 x 1.5 cm Bones/Soft Tissues: There chronic compression fracture deformities in the T12, L2 and L5 vertebral bodies most severe at L5. Kyphoplasty changes are noted at L5. No acute fracture. Status post right hip arthroplasty. Surgical pinning of an old healed left femoral fracture. 1. Questionable mural thickening along the gastroesophageal junction. Endoscopic evaluation recommended. Small hiatal hernia. 2. Mild enlargement of two left para-aortic lymph nodes, with the larger of the two measuring 2.4 x 1.5 cm. They may be reactive or metastatic in etiology. 3. Atrophic native kidneys with nephrolithiasis. No hydronephrosis. Unremarkable transplant kidney in the right iliac fossa. 4. Distal colonic diverticulosis without diverticulitis. CT HEAD WO CONTRAST    Result Date: 8/3/2022  EXAMINATION: CT OF THE HEAD WITHOUT CONTRAST  8/3/2022 9:57 pm TECHNIQUE: CT of the head was performed without the administration of intravenous contrast. Automated exposure control, iterative reconstruction, and/or weight based adjustment of the mA/kV was utilized to reduce the radiation dose to as low as reasonably achievable. COMPARISON: 07/31/2022 HISTORY: ORDERING SYSTEM PROVIDED HISTORY: AMS TECHNOLOGIST PROVIDED HISTORY: Has a \"code stroke\" or \"stroke alert\" been called? ->No Reason for exam:->AMS Decision Support Exception - unselect if not a suspected or confirmed emergency medical condition->Emergency Medical Condition (MA) FINDINGS: BRAIN/VENTRICLES: There is no acute intracranial hemorrhage, mass effect or midline shift. No abnormal extra-axial fluid collection. The gray-white differentiation is maintained without evidence of an acute infarct. There is no evidence of hydrocephalus.   There are nonspecific hypoattenuating foci in the subcortical and periventricular white matter that most likely represent chronic microangiopathic ischemic changes in a patient of this age. There is diffuse cortical atrophy with ex vacuo dilatation of the ventricles. ORBITS: The visualized portion of the orbits demonstrate no acute abnormality. SINUSES: The visualized paranasal sinuses and mastoid air cells demonstrate no acute abnormality. SOFT TISSUES/SKULL:  No acute abnormality of the visualized skull or soft tissues. No acute intracranial abnormality. CT HEAD WO CONTRAST    Result Date: 7/31/2022  EXAMINATION: CT OF THE HEAD WITHOUT CONTRAST; CT OF THE CERVICAL SPINE WITHOUT CONTRAST 7/31/2022 11:45 am TECHNIQUE: CT of the head was performed without the administration of intravenous contrast. Automated exposure control, iterative reconstruction, and/or weight based adjustment of the mA/kV was utilized to reduce the radiation dose to as low as reasonably achievable.; CT of the cervical spine was performed without the administration of intravenous contrast. Multiplanar reformatted images are provided for review. Automated exposure control, iterative reconstruction, and/or weight based adjustment of the mA/kV was utilized to reduce the radiation dose to as low as reasonably achievable. COMPARISON: October 2, 2019 HISTORY: ORDERING SYSTEM PROVIDED HISTORY: Evaluate intracranial abnormality TECHNOLOGIST PROVIDED HISTORY: Has a \"code stroke\" or \"stroke alert\" been called? ->No Reason for exam:->Evaluate intracranial abnormality Reason for exam:->Fall/head injury Decision Support Exception - unselect if not a suspected or confirmed emergency medical condition->Emergency Medical Condition (MA) FINDINGS: CT head: No acute intracranial hemorrhage or edema. There is prominence of sulci, cisterns, and ventricles related to generalized parenchymal volume loss. No abnormal extra-axial fluid collections. No hydrocephalus. No depressed calvarial fracture. Paranasal sinuses and mastoid air cells are clear. CT cervical spine: No fracture or malalignment of the cervical spine. Severe disc space narrowing at C3/C4, C5/C6, and C7/T1. Degenerative posterior disc/osteophyte complex at C2/C3, C3/C4, C4/C5, C5/C6, and C7/T1 results in mild-to-moderate effacement of ventral thecal sac. Severe facet arthropathy on the right at C2/C3. There is no prevertebral soft tissue edema. 1.  No acute intracranial hemorrhage or edema. 2.  No cervical spine fracture or malalignment. CT CERVICAL SPINE WO CONTRAST    Result Date: 7/31/2022  EXAMINATION: CT OF THE HEAD WITHOUT CONTRAST; CT OF THE CERVICAL SPINE WITHOUT CONTRAST 7/31/2022 11:45 am TECHNIQUE: CT of the head was performed without the administration of intravenous contrast. Automated exposure control, iterative reconstruction, and/or weight based adjustment of the mA/kV was utilized to reduce the radiation dose to as low as reasonably achievable.; CT of the cervical spine was performed without the administration of intravenous contrast. Multiplanar reformatted images are provided for review. Automated exposure control, iterative reconstruction, and/or weight based adjustment of the mA/kV was utilized to reduce the radiation dose to as low as reasonably achievable. COMPARISON: October 2, 2019 HISTORY: ORDERING SYSTEM PROVIDED HISTORY: Evaluate intracranial abnormality TECHNOLOGIST PROVIDED HISTORY: Has a \"code stroke\" or \"stroke alert\" been called? ->No Reason for exam:->Evaluate intracranial abnormality Reason for exam:->Fall/head injury Decision Support Exception - unselect if not a suspected or confirmed emergency medical condition->Emergency Medical Condition (MA) FINDINGS: CT head: No acute intracranial hemorrhage or edema. There is prominence of sulci, cisterns, and ventricles related to generalized parenchymal volume loss. No abnormal extra-axial fluid collections. No hydrocephalus. No depressed calvarial fracture.   Paranasal sinuses and mastoid air cells are clear. CT cervical spine: No fracture or malalignment of the cervical spine. Severe disc space narrowing at C3/C4, C5/C6, and C7/T1. Degenerative posterior disc/osteophyte complex at C2/C3, C3/C4, C4/C5, C5/C6, and C7/T1 results in mild-to-moderate effacement of ventral thecal sac. Severe facet arthropathy on the right at C2/C3. There is no prevertebral soft tissue edema. 1.  No acute intracranial hemorrhage or edema. 2.  No cervical spine fracture or malalignment. XR CHEST PORTABLE    Result Date: 8/3/2022  EXAMINATION: ONE XRAY VIEW OF THE CHEST 8/3/2022 7:35 pm COMPARISON: 07/20/2022 HISTORY: ORDERING SYSTEM PROVIDED HISTORY: eval pneumonia TECHNOLOGIST PROVIDED HISTORY: Reason for exam:->eval pneumonia FINDINGS: The lungs are without acute focal process. There is no effusion or pneumothorax. The cardiomediastinal silhouette is stable. The osseous structures are stable. No acute process. XR CHEST PORTABLE    Result Date: 7/20/2022  EXAMINATION: ONE XRAY VIEW OF THE CHEST 7/20/2022 9:03 pm COMPARISON: 09/14/2019. HISTORY: ORDERING SYSTEM PROVIDED HISTORY: chest pain TECHNOLOGIST PROVIDED HISTORY: Reason for exam:->chest pain FINDINGS: Evaluation is somewhat limited due leftward rotation of the patient. Within this limitation there is suggestion of possible bronchograms in left retrocardiac region which may be due to atelectasis or pneumonia. The right lung is grossly clear. No pneumothorax or pleural effusion is seen. 1. Limited study due to rotation artifact. 2. Questionable left retrocardiac infiltrate. Repeat chest radiograph recommended, PA and lateral if possible. EKG: This EKG is signed and interpreted by me.     Rate: 75  Rhythm: Sinus  Interpretation: concern for anteriior lateral ischemia  Comparison: stable as compared to patient's most recent EKG      ------------------------- NURSING NOTES AND VITALS REVIEWED ---------------------------  Date / Time Roomed:  8/3/2022 8:35 PM  ED Bed Assignment:  26/26    The nursing notes within the ED encounter and vital signs as below have been reviewed. Patient Vitals for the past 24 hrs:   BP Temp Temp src Pulse Resp SpO2 Height Weight   08/03/22 2333 109/71 -- -- 79 16 100 % -- --   08/03/22 2303 102/66 -- -- 79 16 99 % -- --   08/03/22 2217 117/70 97.4 °F (36.3 °C) Oral 74 16 98 % -- --   08/03/22 2050 -- 97.7 °F (36.5 °C) Oral -- -- -- -- --   08/03/22 2046 111/70 -- -- 73 16 97 % 5' (1.524 m) 96 lb 6.4 oz (43.7 kg)       Oxygen Saturation Interpretation: Normal    ------------------------------------------ PROGRESS NOTES ------------------------------------------  Counseling:  I have spoken with the patient and discussed todays results, in addition to providing specific details for the plan of care and counseling regarding the diagnosis and prognosis. Their questions are answered at this time and they are agreeable with the plan of admission.    --------------------------------- ADDITIONAL PROVIDER NOTES   This patient's ED course included: a personal history and physicial examination, re-evaluation prior to disposition, multiple bedside re-evaluations, IV medications, cardiac monitoring, and continuous pulse oximetry    This patient has remained hemodynamically stable during their ED course. Diagnosis:  1. TRACI (acute kidney injury) ([facility] [address]    2. Other fatigue        Disposition:  Patient's disposition: Admit to telemetry  Patient's condition is fair.          Tayler Palomares, DO  08/04/22 [id number]

## 2022-08-04 NOTE — H&P
CHIEF COMPLAINT:  Weakness and decreased po intake. HISTORY OF PRESENT ILLNESS:      The patient is a 74 y.o. female patient of Dr. Cara Barrera who presents with the above. 72-year-old female history of idiopathic pulmonary fibrosis CHFwith fatigue. The patient reportedly was seen recently for fatigue and the discharge she states symptoms have worsened here. EMS there is concern for altered mental status as well the patient is ANO x1 according to nursing staff. There is no chest pain or shortness of breath no belly pain and no other complaints at this time     Medical hx significant for refractory partial complex/mixed seizures followed by [facility]. She states she has been there since the Spring and she has made no med adjustments of meds on her own. Past Medical History:        Past Surgical History:    Past Surgical History:   Procedure Laterality Date    AV FISTULA REPAIR Right 08/17/2017    BACK SURGERY      CHOLECYSTECTOMY      COLONOSCOPY      DIALYSIS FISTULA CREATION Right 04/27/2017    right arm AV fistula/Dr. LAN    FIXATION KYPHOPLASTY  11/3/2015    kyphoplasty L5    FRACTURE SURGERY Left 12/2014    fracture    HERNIA REPAIR      HIP SURGERY      left    JOINT REPLACEMENT Bilateral     HIP    JOINT REPLACEMENT Left     knee    KNEE SURGERY      left    LEG SURGERY Left 12/06/2016    ORIF left femoral shaft and suprachondylr femur    LUNG TRANSPLANT  2003    [facility]    OTHER SURGICAL HISTORY  11/14/2017    RUE fistula revision    MD ANASTOMOSIS,AV,ANY SITE Right 9/28/2018    AV FISTULA  REVISION -- RIGHT ARM performed by Ras Hickman MD at [address] [place] [address]       Medications Prior to Admission:    Medications Prior to Admission: ondansetron (ZOFRAN) 4 MG tablet, Take 1 tablet by mouth every 8 hours as needed for Nausea or Vomiting  famotidine (PEPCID) 20 MG tablet, Take 1 tablet by mouth in the morning for 14 days. lacosamide (VIMPAT) 50 MG TABS tablet, Take 250 mg by mouth three times a week.  HD days, Mon, Wed, Fri  carvedilol (COREG) 6.25 MG tablet, TK 1 T PO BID  levETIRAcetam (KEPPRA) 100 MG/ML solution, Take 1,500 mg by mouth 2 times daily   acetaminophen (TYLENOL) 325 MG tablet, Take 650 mg by mouth  lacosamide (VIMPAT) 200 MG tablet, Take 1 tablet by mouth 2 times daily . (Patient taking differently: Take 200 mg by mouth every morning. )  lacosamide (VIMPAT) 50 MG TABS tablet, Take 5 tablets by mouth nightly  200 mg in the Morning 250 mg Nightly . (Patient taking differently: Take 50 mg by mouth four times a week. 200 mg in the Morning 250 mg Nightly)  primidone (MYSOLINE) 50 MG tablet, Take 3 tablets by mouth 2 times daily  amLODIPine (NORVASC) 10 MG tablet, Take 1 tablet by mouth daily (Patient taking differently: Take 10 mg by mouth every morning Take morning of surgery with a sip of water)  tacrolimus (PROGRAF) 1 MG capsule, Take 3 mg by mouth daily 3 mg AM and Lunch, 2 mg Nightly  predniSONE (DELTASONE) 5 MG tablet, Take 5 mg by mouth every morning     Allergies:    Ambien [zolpidem tartrate], Klonopin [clonazepam], Lorazepam, and Eszopiclone    Social History:    reports that she has never smoked. She has never used smokeless tobacco. She reports that she does not drink alcohol and does not use drugs. Family History:   Family history is unknown by patient. REVIEW OF SYSTEMS:  As above in the HPI, otherwise negative    PHYSICAL EXAM:    Vitals:  BP (!) 133/98   Pulse 70   Temp 97.7 °F (36.5 °C) (Oral)   Resp 16   Ht 5' (1.524 m)   Wt 96 lb 6.4 oz (43.7 kg)   SpO2 100%   BMI 18.83 kg/m²     General:  Awake, alert, oriented X 3, but deliberate speech. Well developed, well nourished, well groomed. No apparent distress. HEENT:  Normocephalic, atraumatic. Pupils equal, round, reactive to light. No scleral icterus. No conjunctival injection. Normal lips, teeth, and gums. No nasal discharge.   Neck:  Supple  Heart:  RRR, no murmurs, gallops, rubs  Lungs:  CTA bilaterally, bilat symmetrical expansion, no wheeze, rales, or rhonchi  Abdomen: Bowel sounds present, soft, nontender, no masses, no organomegaly, no peritoneal signs  Extremities:  No clubbing, cyanosis, or edema  Skin:  Warm and dry, no open lesions or rash  Neuro:  Cranial nerves 2-12 intact, no focal deficits. Breast: deferred  Rectal: deferred  Genitalia:  deferred    LABS:  CBC:   Lab Results   Component Value Date/Time    WBC 4.1 08/03/2022 09:36 PM    RBC 3.39 08/03/2022 09:36 PM    HGB 11.5 08/03/2022 09:36 PM    HCT 35.3 08/03/2022 09:36 PM    .1 08/03/2022 09:36 PM    MCH 33.9 08/03/2022 09:36 PM    MCHC 32.6 08/03/2022 09:36 PM    RDW 12.5 08/03/2022 09:36 PM     08/03/2022 09:36 PM    MPV 9.8 08/03/2022 09:36 PM     CMP:    Lab Results   Component Value Date/Time     08/03/2022 09:36 PM    K 4.9 08/03/2022 09:36 PM    K 4.3 07/31/2022 10:41 AM    CL 94 08/03/2022 09:36 PM    CO2 22 08/03/2022 09:36 PM    BUN 65 08/03/2022 09:36 PM    CREATININE 2.4 08/03/2022 09:36 PM    GFRAA 24 08/03/2022 09:36 PM    LABGLOM 20 08/03/2022 09:36 PM    GLUCOSE 211 08/03/2022 09:36 PM    GLUCOSE 85 03/25/2012 06:25 AM    PROT 7.0 08/03/2022 09:36 PM    LABALBU 3.6 08/03/2022 09:36 PM    CALCIUM 9.2 08/03/2022 09:36 PM    BILITOT 0.3 08/03/2022 09:36 PM    ALKPHOS 80 08/03/2022 09:36 PM    AST 19 08/03/2022 09:36 PM    ALT 10 08/03/2022 09:36 PM     BMP:    Lab Results   Component Value Date/Time     08/03/2022 09:36 PM    K 4.9 08/03/2022 09:36 PM    K 4.3 07/31/2022 10:41 AM    CL 94 08/03/2022 09:36 PM    CO2 22 08/03/2022 09:36 PM    BUN 65 08/03/2022 09:36 PM    LABALBU 3.6 08/03/2022 09:36 PM    CREATININE 2.4 08/03/2022 09:36 PM    CALCIUM 9.2 08/03/2022 09:36 PM    GFRAA 24 08/03/2022 09:36 PM    LABGLOM 20 08/03/2022 09:36 PM    GLUCOSE 211 08/03/2022 09:36 PM    GLUCOSE 85 03/25/2012 06:25 AM     Magnesium:    Lab Results   Component Value Date/Time    MG 2.2 10/05/2019 03:52 AM     CXR clear.     CT of head unremarakble    UA with UTI evidence(urine and blood cultures negative)    ASSESSMENT:      Principal Problem:    TRACI (acute kidney injury) ([facility]  Plan: Hydrate  Active Problems:    Immunosuppressed status ([facility]  Plan: Monitor    Megaloblastic anemia  Plan: previously evaluated    Idiopathic pulmonary fibrosis ([facility]  Plan: Stable. (Lenny garcia)    Seizure disorder [facility])  Plan: See meds. Osteoporosis  Plan: Acknowledged    Acute on chronic kidney failure ([facility]  Plan: Hydration in progress. Essential hypertension  Plan: Controlled      PLAN:    Hydrate. Await cultures. Empiric Rocephin after cultures    Problem being \"altered mental status\" to a degree. Seizure med levels pending. No Neurology coverage at [facility]. If nothing else explanatory, will need to transfer to [facility] for further Neuro evaluation.     Tamika Brennan MD  6:56 AM  8/4/2022

## 2022-08-04 NOTE — PROGRESS NOTES
[facility] Quality Flow/Interdisciplinary Rounds Progress Note        Quality Flow Rounds held on August 4, 2022    Disciplines Attending:  Bedside Nurse, , , and Nursing Unit Leadership    Bryn Smith was admitted on 8/3/2022  8:35 PM    Anticipated Discharge Date:       Disposition:    Suman Score:  Suman Scale Score: 16    Readmission Risk              Risk of Unplanned Readmission:  0           Discussed patient goal for the day, patient clinical progression, and barriers to discharge. The following Goal(s) of the Day/Commitment(s) have been identified:  Diagnostics - Report Results, check primary plan, and discharge planning home vs [facility].       Eulalio Councilman, RN  August 4, 2022

## 2022-08-04 NOTE — CONSULTS
Comprehensive Nutrition Assessment    Type and Reason for Visit:  Initial, Consult, Positive Nutrition Screen    Nutrition Recommendations/Plan:   Continue current diet & ADAT  Will add Magic Cup BID, monitor tolerance     Malnutrition Assessment:  Malnutrition Status: Moderate malnutrition (08/04/22 1345)    Context:  Chronic Illness     Findings of the 6 clinical characteristics of malnutrition:  Energy Intake:  75% or less estimated energy requirements for 1 month or longer  Weight Loss:  Unable to assess (d/t hx CHF)     Body Fat Loss:  Mild body fat loss Triceps, Orbital, Buccal region   Muscle Mass Loss:   (moderate) Temples (temporalis), Clavicles (pectoralis & deltoids), Scapula (trapezius)  Fluid Accumulation:  No significant fluid accumulation     Strength:  Not Performed    Nutrition Assessment:    Pt w/ TRACI, AMS. Hx CKD (h/o dialysis), kidney transplant (2020), CHF, seizures, IPF, lung transplant (2003). Full liquid diet intake 1-25%, pt w/ moderate malnutrition, will add ONS & monitor. Nutrition Related Findings:    A&O X2, I&Os WDL, BLE trace edema, abd soft/flat, +BS,  (steroid order) Wound Type: None       Current Nutrition Intake & Therapies:    Average Meal Intake: 1-25%  Average Supplements Intake: None Ordered  ADULT DIET; Full Liquid    Anthropometric Measures:  Height: 5' (152.4 cm)  Ideal Body Weight (IBW): 100 lbs (45 kg)    Admission Body Weight: 104 lb 12.8 oz (47.5 kg) (8/4 bed)  Current Body Weight: 104 lb 12.8 oz (47.5 kg) (8/4 bed), 104.8 % IBW.     Current BMI (kg/m2): 20.5  Usual Body Weight: 113 lb (51.3 kg) (10/2021 actual per EMR OV, 110# X 3 months OV)  % Weight Change (Calculated): -7.3  Weight Adjustment For: No Adjustment                 BMI Categories: Underweight (BMI less than 22) age over 72    Estimated Daily Nutrient Needs:  Energy Requirements Based On: Kcal/kg  Weight Used for Energy Requirements: Admission  Energy (kcal/day):   Weight Used for Protein Requirements: Admission  Protein (g/day): 50-60 (1-1.2 as tolerated)  Method Used for Fluid Requirements: 1 ml/kcal  Fluid (ml/day):  or per renal    Nutrition Diagnosis:   Moderate malnutrition, In context of chronic illness related to catabolic illness as evidenced by Criteria as identified in malnutrition assessment    Nutrition Interventions:   Food and/or Nutrient Delivery: Continue Current Diet, Start Oral Nutrition Supplement (ADAT)  Nutrition Education/Counseling: Education initiated (discussed ONS w/ pt, pt ammendable to Boost Pudding or Magic Cup only at this time)  Coordination of Nutrition Care: Continue to monitor while inpatient       Goals:     Goals: PO intake 50% or greater, by next RD assessment       Nutrition Monitoring and Evaluation:      Food/Nutrient Intake Outcomes: Food and Nutrient Intake, Supplement Intake  Physical Signs/Symptoms Outcomes: Biochemical Data    Discharge Planning:     Too soon to determine     Sheri Cade RD, LD  Contact: [phone]

## 2022-08-04 NOTE — PROGRESS NOTES
Physical Therapy  Facility/Department: [facility]  Physical Therapy Initial Assessment    Name: Marvel Silva  : 1955  MRN: [id number]  Date of Service: 2022      Patient Diagnosis(es): The primary encounter diagnosis was TRACI (acute kidney injury) ([address] A diagnosis of Other fatigue was also pertinent to this visit. Past Medical History:  has a past medical history of Acute on chronic respiratory failure with hypoxia ([address] Anemia, Cardiomegaly, CHF (congestive heart failure) ([address] Chronic kidney disease, Constipation, Diaphragmatic hernia, Diverticulitis, Epilepsy ([address] Falls, GERD (gastroesophageal reflux disease), Hemiparesis ([address] Hemiplegia ([address] Hemodialysis patient ([address] History of blood transfusion, History of lung transplant ([address] Hyperparathyroidism ([address] Hypertension, Immunosuppressed status ([address] Insomnia, IPF (idiopathic pulmonary fibrosis) ([address] Kidney stone, Neutropenia ([address] 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 Past Surgical History:  has a past surgical history that includes Lung transplant (); hip surgery; knee surgery; hernia repair; Cholecystectomy; Fixation Kyphoplasty (11/3/2015); Leg Surgery (Left, 2016); Dialysis fistula creation (Right, 2017); back surgery; AV fistula repair (Right, 2017); Colonoscopy; other surgical history (2017); fracture surgery (Left, 2014); joint replacement (Bilateral); joint replacement (Left); and pr anastomosis,av,any site (Right, 2018). Evaluating Therapist: Avelino Brain PT    Room #:  8250/0963-Q  Diagnosis:  TRACI (acute kidney injury) ([address] [N17.9]  Other fatigue [R53.83]  PMHx/PSHx:  CHF, CKD, hx of lung transplant, Epilepsy  Precautions:  falls, alarm      Social:  Pt lives with  in a 1 floor plan with ramped entrance. Ambulates with ww.       Initial Evaluation  Date: 8/4/22 Treatment      Short Term/ Long Term   Goals   Was pt agreeable to Eval/treatment? yes     Does pt have pain? Neck pain     Bed Mobility  Rolling: max assist  Supine to sit: max assist  Sit to supine: max assist  Scooting: max assist  Min assist   Transfers Sit to stand: max assist  Stand to sit: max assist  Stand pivot: NT  Min assist   Ambulation    NT  25 feet with ww with min assist   Stair Negotiation  Ascended and descended  NT   N/A   LE strength     B ankle dorsiflexion 0/5 all else 3-/5    3+/5   balance      poor     AM-PAC Raw score               10/24         Pt is alert and Oriented to person and place  LE ROM: WFL  Edema: none  Endurance: poor     ASSESSMENT:    Pt displays functional ability as noted in the objective portion of this evaluation. Patient education  Pt educated on PT objectives    Patient response to education:   Pt verbalized understanding Pt demonstrated skill Pt requires further education in this area   yes           Comments:  Pt c/o dizziness upon sitting up. Unable to hold head up sitting edge of bed. Max assist for standing at ww. Pt returned to bed at end of session for safety      Pt's/ family goals   1. To get stronger    Conditions Requiring Skilled Therapeutic Intervention:    [x]Decreased strength     []Decreased ROM  [x]Decreased functional mobility  [x]Decreased balance   [x]Decreased endurance   []Decreased posture  []Decreased sensation  []Decreased coordination   []Decreased vision  []Decreased safety awareness   [x]Increased pain       Patient and or family understand(s) diagnosis, prognosis, and plan of care.     Prognosis is good for reaching above PT goals    PHYSICAL THERAPY PLAN OF CARE:    PT POC is established based on physician order and patient diagnosis     Referring provider/PT Order: Darius De La Rosa MD/ PT eval and treat      Current Treatment Recommendations:     [x] Strengthening to improve independence with functional mobility [] ROM to improve independence with functional mobility   [x] Balance Training to improve static/dynamic balance and to reduce fall risk  [x] Endurance Training to improve activity tolerance during functional mobility   [x] Transfer Training to improve safety and independence with all functional transfers   [x] Gait Training to improve gait mechanics, endurance and assess need for appropriate assistive device  [] Stair Training in preparation for safe discharge home and/or into the community   [] Positioning to prevent skin breakdown and contractures  [x] Safety and Education Training   [x] Patient/Caregiver Education   [] HEP  [] Other     PT long term treatment goals are located in above grid    Frequency of treatments: 2-5x/week x 5-7 days. Time in  0945  Time out  1000      Evaluation Time includes thorough review of current medical information, gathering information on past medical history/social history and prior level of function, completion of standardized testing/informal observation of tasks, assessment of data and education on plan of care and goals.       CPT codes:  [x] Low Complexity PT evaluation 02111  [] Moderate Complexity PT evaluation 82687  [] High Complexity PT evaluation 35158  [] PT Re-evaluation 40892  [] Gait training 11157 minutes  [] Manual therapy 71437 minutes  [] Therapeutic activities 77067 minutes  [] Therapeutic exercises 31036 minutes  [] Neuromuscular reeducation 43953 minutes     Manoj Hickman PT [id number]

## 2022-08-05 PROBLEM — E53.8 FOLIC ACID DEFICIENCY: Status: ACTIVE | Noted: 2022-08-05

## 2022-08-05 LAB
ANION GAP SERPL CALCULATED.3IONS-SCNC: 14 MMOL/L (ref 7–16)
BUN BLDV-MCNC: 49 MG/DL (ref 6–23)
CALCIUM SERPL-MCNC: 8.6 MG/DL (ref 8.6–10.2)
CHLORIDE BLD-SCNC: 102 MMOL/L (ref 98–107)
CO2: 18 MMOL/L (ref 22–29)
CREAT SERPL-MCNC: 1.3 MG/DL (ref 0.5–1)
GFR AFRICAN AMERICAN: 50
GFR NON-AFRICAN AMERICAN: 41 ML/MIN/1.73
GLUCOSE BLD-MCNC: 106 MG/DL (ref 74–99)
HCT VFR BLD CALC: 31 % (ref 34–48)
HEMOGLOBIN: 10.4 G/DL (ref 11.5–15.5)
MCH RBC QN AUTO: 34.2 PG (ref 26–35)
MCHC RBC AUTO-ENTMCNC: 33.5 % (ref 32–34.5)
MCV RBC AUTO: 102 FL (ref 80–99.9)
PDW BLD-RTO: 12.8 FL (ref 11.5–15)
PLATELET # BLD: 207 E9/L (ref 130–450)
PMV BLD AUTO: 10.2 FL (ref 7–12)
POTASSIUM SERPL-SCNC: 4.2 MMOL/L (ref 3.5–5)
RBC # BLD: 3.04 E12/L (ref 3.5–5.5)
SODIUM BLD-SCNC: 134 MMOL/L (ref 132–146)
T4 TOTAL: 5.1 MCG/DL (ref 4.5–11.7)
TSH SERPL DL<=0.05 MIU/L-ACNC: 0.69 UIU/ML (ref 0.27–4.2)
WBC # BLD: 4.7 E9/L (ref 4.5–11.5)

## 2022-08-05 PROCEDURE — 80184 ASSAY OF PHENOBARBITAL: CPT

## 2022-08-05 PROCEDURE — 96372 THER/PROPH/DIAG INJ SC/IM: CPT

## 2022-08-05 PROCEDURE — 96361 HYDRATE IV INFUSION ADD-ON: CPT

## 2022-08-05 PROCEDURE — 80048 BASIC METABOLIC PNL TOTAL CA: CPT

## 2022-08-05 PROCEDURE — 6360000002 HC RX W HCPCS: Performed by: INTERNAL MEDICINE

## 2022-08-05 PROCEDURE — G0378 HOSPITAL OBSERVATION PER HR: HCPCS

## 2022-08-05 PROCEDURE — 6370000000 HC RX 637 (ALT 250 FOR IP): Performed by: INTERNAL MEDICINE

## 2022-08-05 PROCEDURE — 85027 COMPLETE CBC AUTOMATED: CPT

## 2022-08-05 PROCEDURE — 2580000003 HC RX 258: Performed by: INTERNAL MEDICINE

## 2022-08-05 PROCEDURE — 80188 ASSAY OF PRIMIDONE: CPT

## 2022-08-05 PROCEDURE — 84436 ASSAY OF TOTAL THYROXINE: CPT

## 2022-08-05 PROCEDURE — 36415 COLL VENOUS BLD VENIPUNCTURE: CPT

## 2022-08-05 PROCEDURE — 96376 TX/PRO/DX INJ SAME DRUG ADON: CPT

## 2022-08-05 PROCEDURE — 84443 ASSAY THYROID STIM HORMONE: CPT

## 2022-08-05 PROCEDURE — 2580000003 HC RX 258: Performed by: EMERGENCY MEDICINE

## 2022-08-05 RX ADMIN — HEPARIN SODIUM 5000 UNITS: 10000 INJECTION INTRAVENOUS; SUBCUTANEOUS at 09:38

## 2022-08-05 RX ADMIN — PREDNISONE 5 MG: 5 TABLET ORAL at 09:41

## 2022-08-05 RX ADMIN — FAMOTIDINE 10 MG: 20 TABLET ORAL at 09:38

## 2022-08-05 RX ADMIN — TACROLIMUS 3 MG: 1 CAPSULE ORAL at 09:37

## 2022-08-05 RX ADMIN — FOLIC ACID 1 MG: 1 TABLET ORAL at 09:38

## 2022-08-05 RX ADMIN — LACOSAMIDE 150 MG: 100 TABLET, FILM COATED ORAL at 06:41

## 2022-08-05 RX ADMIN — AMLODIPINE BESYLATE 10 MG: 10 TABLET ORAL at 09:39

## 2022-08-05 RX ADMIN — WATER 1000 MG: 1 INJECTION INTRAMUSCULAR; INTRAVENOUS; SUBCUTANEOUS at 06:41

## 2022-08-05 RX ADMIN — SODIUM CHLORIDE: 9 INJECTION, SOLUTION INTRAVENOUS at 20:45

## 2022-08-05 RX ADMIN — Medication 10 ML: at 09:40

## 2022-08-05 RX ADMIN — PRIMIDONE 150 MG: 50 TABLET ORAL at 20:42

## 2022-08-05 RX ADMIN — CARVEDILOL 6.25 MG: 6.25 TABLET, FILM COATED ORAL at 17:48

## 2022-08-05 RX ADMIN — SODIUM CHLORIDE: 9 INJECTION, SOLUTION INTRAVENOUS at 01:57

## 2022-08-05 RX ADMIN — PRIMIDONE 150 MG: 50 TABLET ORAL at 09:38

## 2022-08-05 RX ADMIN — HEPARIN SODIUM 5000 UNITS: 10000 INJECTION INTRAVENOUS; SUBCUTANEOUS at 20:42

## 2022-08-05 RX ADMIN — Medication 10 ML: at 20:42

## 2022-08-05 RX ADMIN — LEVETIRACETAM 1500 MG: 100 SOLUTION ORAL at 06:40

## 2022-08-05 RX ADMIN — CARVEDILOL 6.25 MG: 6.25 TABLET, FILM COATED ORAL at 09:38

## 2022-08-05 RX ADMIN — LEVETIRACETAM 1500 MG: 100 SOLUTION ORAL at 17:47

## 2022-08-05 RX ADMIN — LACOSAMIDE 150 MG: 100 TABLET, FILM COATED ORAL at 14:28

## 2022-08-05 RX ADMIN — LACOSAMIDE 150 MG: 100 TABLET, FILM COATED ORAL at 20:42

## 2022-08-05 RX ADMIN — SODIUM CHLORIDE: 9 INJECTION, SOLUTION INTRAVENOUS at 10:01

## 2022-08-05 ASSESSMENT — PAIN SCALES - GENERAL: PAINLEVEL_OUTOF10: 0

## 2022-08-05 NOTE — PROGRESS NOTES
status ([facility]    Acute on chronic respiratory failure with hypoxia ([facility])    Acute on chronic diastolic congestive heart failure ([facility])    Generalized seizure disorder ([facility])    Iron deficiency anemia    ESRD (end stage renal disease) on dialysis ([address]    AVF (arteriovenous fistula) ([facility])    Hospital-acquired pneumonia    Pneumonia    Anemia    Inferior pubic ramus fracture, left, closed, initial encounter ([facility])    Nausea & vomiting    Tear of medial collateral ligament of left knee    TRACI (acute kidney injury) ([facility]    Moderate protein-calorie malnutrition ([facility]       Plan:  DC planning. PT/OT.           Arlyn Young MD  7:25 AM  8/5/2022

## 2022-08-05 NOTE — PROGRESS NOTES
Called lab questioning results for T4, TSH, and Primidone levels from yesterday. Will need to redraw for primidone. Per lab it was not received yesterday with the other labwork.     Electronically signed by Maco Wiley RN on 8/5/2022 at 7:55 AM

## 2022-08-05 NOTE — PROGRESS NOTES
Call placed to Dr. Petey Hunt office expressing 's concerns about wife's hospital progress. Patient's  asked to speak to Dr. Aminata Decker directly.     Electronically signed by Adrian Neely RN on 8/5/2022 at 1:53 PM

## 2022-08-05 NOTE — PLAN OF CARE
Problem: Discharge Planning  Goal: Discharge to home or other facility with appropriate resources  Outcome: Progressing  Flowsheets (Taken 8/5/2022 1407 by Tianna Reeves RN)  Discharge to home or other facility with appropriate resources: Refer to discharge planning if patient needs post-hospital services based on physician order or complex needs related to functional status, cognitive ability or social support system     Problem: Skin/Tissue Integrity  Goal: Absence of new skin breakdown  Description: 1. Monitor for areas of redness and/or skin breakdown  2. Assess vascular access sites hourly  3. Every 4-6 hours minimum:  Change oxygen saturation probe site  4. Every 4-6 hours:  If on nasal continuous positive airway pressure, respiratory therapy assess nares and determine need for appliance change or resting period.   Outcome: Progressing     Problem: Safety - Adult  Goal: Free from fall injury  Outcome: Progressing     Problem: Chronic Conditions and Co-morbidities  Goal: Patient's chronic conditions and co-morbidity symptoms are monitored and maintained or improved  Outcome: Progressing     Problem: Nutrition Deficit:  Goal: Optimize nutritional status  Outcome: Progressing

## 2022-08-06 LAB
ANION GAP SERPL CALCULATED.3IONS-SCNC: 16 MMOL/L (ref 7–16)
BUN BLDV-MCNC: 33 MG/DL (ref 6–23)
CALCIUM SERPL-MCNC: 8.8 MG/DL (ref 8.6–10.2)
CHLORIDE BLD-SCNC: 103 MMOL/L (ref 98–107)
CO2: 16 MMOL/L (ref 22–29)
CREAT SERPL-MCNC: 0.9 MG/DL (ref 0.5–1)
GFR AFRICAN AMERICAN: >60
GFR NON-AFRICAN AMERICAN: >60 ML/MIN/1.73
GLUCOSE BLD-MCNC: 110 MG/DL (ref 74–99)
HCT VFR BLD CALC: 32.6 % (ref 34–48)
HEMOGLOBIN: 10.6 G/DL (ref 11.5–15.5)
MCH RBC QN AUTO: 34 PG (ref 26–35)
MCHC RBC AUTO-ENTMCNC: 32.5 % (ref 32–34.5)
MCV RBC AUTO: 104.5 FL (ref 80–99.9)
PDW BLD-RTO: 12.9 FL (ref 11.5–15)
PLATELET # BLD: 213 E9/L (ref 130–450)
PMV BLD AUTO: 9.8 FL (ref 7–12)
POTASSIUM SERPL-SCNC: 4.1 MMOL/L (ref 3.5–5)
RBC # BLD: 3.12 E12/L (ref 3.5–5.5)
SODIUM BLD-SCNC: 135 MMOL/L (ref 132–146)
WBC # BLD: 6.2 E9/L (ref 4.5–11.5)

## 2022-08-06 PROCEDURE — 80048 BASIC METABOLIC PNL TOTAL CA: CPT

## 2022-08-06 PROCEDURE — 96375 TX/PRO/DX INJ NEW DRUG ADDON: CPT

## 2022-08-06 PROCEDURE — 96372 THER/PROPH/DIAG INJ SC/IM: CPT

## 2022-08-06 PROCEDURE — 6370000000 HC RX 637 (ALT 250 FOR IP): Performed by: INTERNAL MEDICINE

## 2022-08-06 PROCEDURE — 96361 HYDRATE IV INFUSION ADD-ON: CPT

## 2022-08-06 PROCEDURE — 36415 COLL VENOUS BLD VENIPUNCTURE: CPT

## 2022-08-06 PROCEDURE — 2580000003 HC RX 258: Performed by: EMERGENCY MEDICINE

## 2022-08-06 PROCEDURE — G0378 HOSPITAL OBSERVATION PER HR: HCPCS

## 2022-08-06 PROCEDURE — 96376 TX/PRO/DX INJ SAME DRUG ADON: CPT

## 2022-08-06 PROCEDURE — 85027 COMPLETE CBC AUTOMATED: CPT

## 2022-08-06 PROCEDURE — 2580000003 HC RX 258: Performed by: INTERNAL MEDICINE

## 2022-08-06 PROCEDURE — 6360000002 HC RX W HCPCS: Performed by: INTERNAL MEDICINE

## 2022-08-06 RX ADMIN — PREDNISONE 5 MG: 5 TABLET ORAL at 07:50

## 2022-08-06 RX ADMIN — LEVETIRACETAM 1500 MG: 100 SOLUTION ORAL at 06:02

## 2022-08-06 RX ADMIN — FAMOTIDINE 10 MG: 20 TABLET ORAL at 07:50

## 2022-08-06 RX ADMIN — SODIUM CHLORIDE: 9 INJECTION, SOLUTION INTRAVENOUS at 22:35

## 2022-08-06 RX ADMIN — LEVETIRACETAM 1500 MG: 100 SOLUTION ORAL at 17:23

## 2022-08-06 RX ADMIN — CARVEDILOL 6.25 MG: 6.25 TABLET, FILM COATED ORAL at 07:50

## 2022-08-06 RX ADMIN — FOLIC ACID 1 MG: 1 TABLET ORAL at 07:50

## 2022-08-06 RX ADMIN — AMLODIPINE BESYLATE 10 MG: 10 TABLET ORAL at 07:50

## 2022-08-06 RX ADMIN — LACOSAMIDE 150 MG: 100 TABLET, FILM COATED ORAL at 22:19

## 2022-08-06 RX ADMIN — CARVEDILOL 6.25 MG: 6.25 TABLET, FILM COATED ORAL at 17:23

## 2022-08-06 RX ADMIN — WATER 1000 MG: 1 INJECTION INTRAMUSCULAR; INTRAVENOUS; SUBCUTANEOUS at 06:02

## 2022-08-06 RX ADMIN — TACROLIMUS 3 MG: 1 CAPSULE ORAL at 07:50

## 2022-08-06 RX ADMIN — HEPARIN SODIUM 5000 UNITS: 10000 INJECTION INTRAVENOUS; SUBCUTANEOUS at 22:19

## 2022-08-06 RX ADMIN — ONDANSETRON 4 MG: 2 INJECTION INTRAMUSCULAR; INTRAVENOUS at 13:08

## 2022-08-06 RX ADMIN — PRIMIDONE 150 MG: 50 TABLET ORAL at 07:50

## 2022-08-06 RX ADMIN — LACOSAMIDE 150 MG: 100 TABLET, FILM COATED ORAL at 06:02

## 2022-08-06 RX ADMIN — PRIMIDONE 150 MG: 50 TABLET ORAL at 22:19

## 2022-08-06 RX ADMIN — LACOSAMIDE 150 MG: 100 TABLET, FILM COATED ORAL at 13:28

## 2022-08-06 RX ADMIN — HEPARIN SODIUM 5000 UNITS: 10000 INJECTION INTRAVENOUS; SUBCUTANEOUS at 07:50

## 2022-08-06 NOTE — PROGRESS NOTES
Internal Medicine  Progress Note  Deni Jacob MD     Subjective:     Patient is alert. Patient reports OK. Tolerating diet well no other c/o. Scheduled Meds:   amLODIPine  10 mg Oral QAM    carvedilol  6.25 mg Oral BID WC    famotidine  10 mg Oral Daily    levETIRAcetam  1,500 mg Oral BID    predniSONE  5 mg Oral QAM    primidone  150 mg Oral BID    tacrolimus  3 mg Oral Daily    cefTRIAXone (ROCEPHIN) IV  1,000 mg IntraVENous Q24H    lacosamide  150 mg Oral 3 times per day    folic acid  1 mg Oral Daily    sodium chloride flush  5-40 mL IntraVENous 2 times per day    heparin (porcine)  5,000 Units SubCUTAneous BID     Continuous Infusions:   sodium chloride      sodium chloride 100 mL/hr at 08/05/22 2045     PRN Meds:acetaminophen, ondansetron, sodium chloride flush, sodium chloride flush, sodium chloride, [DISCONTINUED] ondansetron **OR** ondansetron    Objective:      Physical Exam:  Vitals:   /77   Pulse 86   Temp 98.2 °F (36.8 °C) (Oral)   Resp 16   Ht 5' (1.524 m)   Wt 104 lb 12.8 oz (47.5 kg)   SpO2 100%   BMI 20.47 kg/m²   Orthostatic BPs and Heart Rates:     I/O's  No intake or output data in the 24 hours ending 08/06/22 0736  Exam:  General appearance: alert, appears stated age, and cooperative  Head: Normocephalic, without obvious abnormality, atraumatic  Eyes: conjunctivae/corneas clear. PERRL, EOM's intact. Fundi benign. Throat: lips, mucosa, and tongue normal; teeth and gums normal  Neck: no adenopathy, no carotid bruit, no JVD, supple, symmetrical, trachea midline, and thyroid not enlarged, symmetric, no tenderness/mass/nodules  Back: symmetric, no curvature. ROM normal. No CVA tenderness.   Lungs: clear to auscultation bilaterally  Chest wall: no tenderness  Heart: regular rate and rhythm, S1, S2 normal, no murmur, click, rub or gallop  Abdomen: soft, non-tender; bowel sounds normal; no masses,  no organomegaly  Extremities: extremities normal, atraumatic, no cyanosis or 99.9 fL    MCH 34.0 26.0 - 35.0 pg    MCHC 32.5 32.0 - 34.5 %    RDW 12.9 11.5 - 15.0 fL    Platelets 491 198 - 304 E9/L    MPV 9.8 7.0 - 12.0 fL   Basic Metabolic Panel    Collection Time: 08/06/22  3:50 AM   Result Value Ref Range    Sodium 135 132 - 146 mmol/L    Potassium 4.1 3.5 - 5.0 mmol/L    Chloride 103 98 - 107 mmol/L    CO2 16 (L) 22 - 29 mmol/L    Anion Gap 16 7 - 16 mmol/L    Glucose 110 (H) 74 - 99 mg/dL    BUN 33 (H) 6 - 23 mg/dL    Creatinine 0.9 0.5 - 1.0 mg/dL    GFR Non-African American >60 >=60 mL/min/1.73    GFR African American >60     Calcium 8.8 8.6 - 10.2 mg/dL     Assessment:     Principal Problem:    TRACI (acute kidney injury) ([facility])  Active Problems:    Immunosuppressed status ([facility])    Moderate protein-calorie malnutrition ([facility])    Folic acid deficiency    Megaloblastic anemia    Idiopathic pulmonary fibrosis ([facility])    Seizure disorder ([facility])    Osteoporosis    Acute on chronic kidney failure ([facility])    Essential hypertension  Resolved Problems:    * No resolved hospital problems.  *      Plan:   OK for discharge from our standpoint  Joleen Hernández MD  8/6/2022  7:36 AM

## 2022-08-07 LAB
ANION GAP SERPL CALCULATED.3IONS-SCNC: 14 MMOL/L (ref 7–16)
BUN BLDV-MCNC: 18 MG/DL (ref 6–23)
CALCIUM SERPL-MCNC: 8.5 MG/DL (ref 8.6–10.2)
CHLORIDE BLD-SCNC: 109 MMOL/L (ref 98–107)
CO2: 15 MMOL/L (ref 22–29)
CREAT SERPL-MCNC: 0.7 MG/DL (ref 0.5–1)
GFR AFRICAN AMERICAN: >60
GFR NON-AFRICAN AMERICAN: >60 ML/MIN/1.73
GLUCOSE BLD-MCNC: 109 MG/DL (ref 74–99)
HCT VFR BLD CALC: 30.3 % (ref 34–48)
HEMOGLOBIN: 9.8 G/DL (ref 11.5–15.5)
MCH RBC QN AUTO: 34.4 PG (ref 26–35)
MCHC RBC AUTO-ENTMCNC: 32.3 % (ref 32–34.5)
MCV RBC AUTO: 106.3 FL (ref 80–99.9)
PDW BLD-RTO: 13 FL (ref 11.5–15)
PHENOBARBITAL LEVEL: 16.1 UG/ML (ref 15–40)
PLATELET # BLD: 216 E9/L (ref 130–450)
PMV BLD AUTO: 9.6 FL (ref 7–12)
POTASSIUM SERPL-SCNC: 4.3 MMOL/L (ref 3.5–5)
PRIMIDONE LEVEL: 6 UG/ML (ref 5–12)
RBC # BLD: 2.85 E12/L (ref 3.5–5.5)
SODIUM BLD-SCNC: 138 MMOL/L (ref 132–146)
URINE CULTURE, ROUTINE: NORMAL
WBC # BLD: 5.9 E9/L (ref 4.5–11.5)

## 2022-08-07 PROCEDURE — 96376 TX/PRO/DX INJ SAME DRUG ADON: CPT

## 2022-08-07 PROCEDURE — 80048 BASIC METABOLIC PNL TOTAL CA: CPT

## 2022-08-07 PROCEDURE — G0378 HOSPITAL OBSERVATION PER HR: HCPCS

## 2022-08-07 PROCEDURE — 36415 COLL VENOUS BLD VENIPUNCTURE: CPT

## 2022-08-07 PROCEDURE — 96361 HYDRATE IV INFUSION ADD-ON: CPT

## 2022-08-07 PROCEDURE — 2580000003 HC RX 258: Performed by: INTERNAL MEDICINE

## 2022-08-07 PROCEDURE — 85027 COMPLETE CBC AUTOMATED: CPT

## 2022-08-07 PROCEDURE — 6360000002 HC RX W HCPCS: Performed by: INTERNAL MEDICINE

## 2022-08-07 PROCEDURE — 6370000000 HC RX 637 (ALT 250 FOR IP): Performed by: INTERNAL MEDICINE

## 2022-08-07 PROCEDURE — 96372 THER/PROPH/DIAG INJ SC/IM: CPT

## 2022-08-07 RX ADMIN — Medication 10 ML: at 10:26

## 2022-08-07 RX ADMIN — FAMOTIDINE 10 MG: 20 TABLET ORAL at 10:13

## 2022-08-07 RX ADMIN — FOLIC ACID 1 MG: 1 TABLET ORAL at 10:13

## 2022-08-07 RX ADMIN — TACROLIMUS 3 MG: 1 CAPSULE ORAL at 10:13

## 2022-08-07 RX ADMIN — CARVEDILOL 6.25 MG: 6.25 TABLET, FILM COATED ORAL at 10:13

## 2022-08-07 RX ADMIN — HEPARIN SODIUM 5000 UNITS: 10000 INJECTION INTRAVENOUS; SUBCUTANEOUS at 10:12

## 2022-08-07 RX ADMIN — PREDNISONE 5 MG: 5 TABLET ORAL at 10:13

## 2022-08-07 RX ADMIN — Medication 10 ML: at 21:58

## 2022-08-07 RX ADMIN — AMLODIPINE BESYLATE 10 MG: 10 TABLET ORAL at 10:13

## 2022-08-07 RX ADMIN — PRIMIDONE 150 MG: 50 TABLET ORAL at 21:58

## 2022-08-07 RX ADMIN — LEVETIRACETAM 1500 MG: 100 SOLUTION ORAL at 05:36

## 2022-08-07 RX ADMIN — LACOSAMIDE 150 MG: 100 TABLET, FILM COATED ORAL at 06:29

## 2022-08-07 RX ADMIN — PRIMIDONE 150 MG: 50 TABLET ORAL at 10:12

## 2022-08-07 RX ADMIN — LACOSAMIDE 150 MG: 100 TABLET, FILM COATED ORAL at 21:58

## 2022-08-07 RX ADMIN — HEPARIN SODIUM 5000 UNITS: 10000 INJECTION INTRAVENOUS; SUBCUTANEOUS at 21:57

## 2022-08-07 RX ADMIN — LACOSAMIDE 150 MG: 100 TABLET, FILM COATED ORAL at 13:56

## 2022-08-07 RX ADMIN — LEVETIRACETAM 1500 MG: 100 SOLUTION ORAL at 17:14

## 2022-08-07 RX ADMIN — WATER 1000 MG: 1 INJECTION INTRAMUSCULAR; INTRAVENOUS; SUBCUTANEOUS at 10:12

## 2022-08-07 RX ADMIN — ACETAMINOPHEN 650 MG: 325 TABLET ORAL at 13:56

## 2022-08-07 RX ADMIN — CARVEDILOL 6.25 MG: 6.25 TABLET, FILM COATED ORAL at 17:14

## 2022-08-07 ASSESSMENT — PAIN SCALES - GENERAL: PAINLEVEL_OUTOF10: 0

## 2022-08-07 NOTE — DISCHARGE INSTR - COC
Continuity of Care Form    Patient Name: Musa Vergara   :  1955  MRN:  [id number]    Admit date:  8/3/2022  Discharge date:  2022    Code Status Order: Full Code   Advance Directives:     Admitting Physician:  Sameer Barros MD  PCP: Alejandra Loyola MD    Discharging Nurse: SOLOMON [facility] Unit/Room#: 9642/0636-D  Discharging Unit Phone Number: [phone]    Emergency Contact:   Extended Emergency Contact Information  Primary Emergency Contact: Moises Linn  Address: [address] [place] [address] [address]           [facility], [address] Phone: [phone]  Mobile Phone: [phone]  Relation: Spouse    Past Surgical History:  Past Surgical History:   Procedure Laterality Date    AV FISTULA REPAIR Right 2017    BACK SURGERY      CHOLECYSTECTOMY      COLONOSCOPY      DIALYSIS FISTULA CREATION Right 2017    right arm AV fistula/Dr. LAN    FIXATION KYPHOPLASTY  11/3/2015    kyphoplasty L5    FRACTURE SURGERY Left 2014    fracture    HERNIA REPAIR      HIP SURGERY      left    JOINT REPLACEMENT Bilateral     HIP    JOINT REPLACEMENT Left     knee    KNEE SURGERY      left    LEG SURGERY Left 2016    ORIF left femoral shaft and suprachondylr femur    LUNG TRANSPLANT  2003    [facility]    OTHER SURGICAL HISTORY  2017    RUE fistula revision    RI ANASTOMOSIS,AV,ANY SITE Right 2018    AV FISTULA  REVISION -- RIGHT ARM performed by Arnold Javier MD at [address] [place] [address]       Immunization History:   Immunization History   Administered Date(s) Administered    Influenza Virus Vaccine 10/02/2016    Pneumococcal Conjugate Vaccine 10/02/2016    Td (Adult), 2 Lf Tetanus Toxoid, Pf (Td, Absorbed) 2022    Tdap (Boostrix, Adacel) 06/10/2020       Active Problems:  Patient Active Problem List   Diagnosis Code    Megaloblastic anemia D53.1    Idiopathic pulmonary fibrosis ([facility] [address] J84.112    Seizure disorder ([facility] [address] G40.909    Osteoporosis M81.0    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    Acute on chronic kidney failure (HCC) N17.9, N18.9    Encounter regarding vascular access for dialysis for ESRD ([facility] N18.6, Z99.2    Volume overload E87.70    Essential hypertension I10    Shortness of breath R06.02    Immunosuppressed status ([facility]) D84.9    Acute on chronic respiratory failure with hypoxia ([facility]) J96.21    Acute on chronic diastolic congestive heart failure (formerly [facility]) I50.33    Generalized seizure disorder (formerly [facility]) G40.309    Iron deficiency anemia D50.9    ESRD (end stage renal disease) on dialysis (formerly [facility]) N18.6, Z99.2    AVF (arteriovenous fistula) (formerly [facility]) I77.0    Hospital-acquired pneumonia J18.9, Y95    Pneumonia J18.9    Anemia D64.9    Inferior pubic ramus fracture, left, closed, initial encounter ([facility]) S32.592A    Nausea & vomiting R11.2    Tear of medial collateral ligament of left knee S83.412A    TRACI (acute kidney injury) ([facility] N17.9    Moderate protein-calorie malnutrition (formerly [facility]) U73.5    Folic acid deficiency S15.4       Isolation/Infection:   Isolation            No Isolation          Patient Infection Status       Infection Onset Added Last Indicated Last Indicated By Review Planned Expiration Resolved Resolved By    None active    Resolved    COVID-19 (Rule Out) 08/03/22 08/03/22 08/03/22 COVID-19, Rapid (Ordered)   08/03/22 Rule-Out Test Resulted    COVID-19 (Rule Out) 07/20/22 07/20/22 07/20/22 COVID-19, Rapid (Ordered)   07/20/22 Rule-Out Test Resulted            Nurse Assessment:  Last Vital Signs: BP (!) 166/88   Pulse 89   Temp 98.4 °F (36.9 °C) (Oral)   Resp 18   Ht 5' (1.524 m)   Wt 111 lb 12.8 oz (50.7 kg)   SpO2 94%   BMI 21.83 kg/m²     Last documented pain score (0-10 scale): Pain Level: 0  Last Weight:   Wt Readings from Last 1 Encounters:   08/07/22 111 lb 12.8 oz (50.7 kg)     Mental Status:  oriented, alert, and confused at times    IV Access:  - None    Nursing Mobility/ADLs:  Walking   Assisted  Transfer Assisted  Bathing  Assisted  Dressing  Assisted  Toileting  Assisted  Feeding  Assisted  Med Admin  Independent  Med Delivery   whole    Wound Care Documentation and Therapy:  Incision 09/28/18 Arm Right (Active)   Number of days: 1408        Elimination:  Continence: Bowel: Yes  Bladder: No  Urinary Catheter: None   Colostomy/Ileostomy/Ileal Conduit: No       Date of Last BM: 08/16/2022    Intake/Output Summary (Last 24 hours) at 8/7/2022 0529  Last data filed at 8/7/2022 0507  Gross per 24 hour   Intake 4833.59 ml   Output --   Net 4833.59 ml     I/O last 3 completed shifts: In: 3861 [P.O.:160; I.V.:3701]  Out: -     Safety Concerns: At Risk for Falls    Impairments/Disabilities:      None    Nutrition Therapy:  Current Nutrition Therapy:   - Oral Diet:  Full Liquid    Routes of Feeding: Oral  Liquids: Thin Liquids  Daily Fluid Restriction: no  Last Modified Barium Swallow with Video (Video Swallowing Test): not done    Treatments at the Time of Hospital Discharge:   Respiratory Treatments: see MAR  Oxygen Therapy:  is not on home oxygen therapy.   Ventilator:    - No ventilator support    Rehab Therapies: Physical Therapy and Occupational Therapy  Weight Bearing Status/Restrictions: No weight bearing restrictions  Other Medical Equipment (for information only, NOT a DME order):  walker and hospital bed  Other Treatments: ***    Patient's personal belongings (please select all that are sent with patient):  glasses    RN SIGNATURE:  Electronically signed by Any Anguiano RN on 8/17/22 at 9:52 AM EDT    CASE MANAGEMENT/SOCIAL WORK SECTION    Inpatient Status Date: 08/17/2022    Readmission Risk Assessment Score:  Readmission Risk              Risk of Unplanned Readmission:  0           Discharging to Facility/ Agency   Name:   Address:  Phone:  Fax:    Dialysis Facility (if applicable)   Name:  Address:  Dialysis Schedule:  Phone:  Fax:    / signature: {Esignature:[id number]}    PHYSICIAN SECTION    Prognosis: {Prognosis:8095604061}    Condition at Discharge: [address] Patient Condition:[id number]}    Rehab Potential (if transferring to Rehab): {Prognosis:7580745065}    Recommended Labs or Other Treatments After Discharge: ***    Physician Certification: I certify the above information and transfer of Josie Phan  is necessary for the continuing treatment of the diagnosis listed and that she requires {Admit to Appropriate Level of Care:39927} for {GREATER/LESS:163043924} 30 days.      Update Admission H&P: {CHP DME Changes in THNCA:247846211}    PHYSICIAN SIGNATURE:  Electronically signed by Channing Frances MD on 8/7/22 at 5:29 AM EDT

## 2022-08-07 NOTE — PROGRESS NOTES
Internal Medicine  Progress Note  Jocelyn Donovan MD     Subjective:     Patient is alert. Patient reports OK. Tolerating diet well no other c/o. Scheduled Meds:   amLODIPine  10 mg Oral QAM    carvedilol  6.25 mg Oral BID WC    famotidine  10 mg Oral Daily    levETIRAcetam  1,500 mg Oral BID    predniSONE  5 mg Oral QAM    primidone  150 mg Oral BID    tacrolimus  3 mg Oral Daily    cefTRIAXone (ROCEPHIN) IV  1,000 mg IntraVENous Q24H    lacosamide  150 mg Oral 3 times per day    folic acid  1 mg Oral Daily    sodium chloride flush  5-40 mL IntraVENous 2 times per day    heparin (porcine)  5,000 Units SubCUTAneous BID     Continuous Infusions:   sodium chloride      sodium chloride 100 mL/hr at 08/07/22 0358     PRN Meds:acetaminophen, ondansetron, sodium chloride flush, sodium chloride flush, sodium chloride, [DISCONTINUED] ondansetron **OR** ondansetron    Objective:      Physical Exam:  Vitals:   BP (!) 166/88   Pulse 89   Temp 98.4 °F (36.9 °C) (Oral)   Resp 18   Ht 5' (1.524 m)   Wt 111 lb 12.8 oz (50.7 kg)   SpO2 94%   BMI 21.83 kg/m²   Orthostatic BPs and Heart Rates:     I/O's    Intake/Output Summary (Last 24 hours) at 8/7/2022 0527  Last data filed at 8/7/2022 0507  Gross per 24 hour   Intake 4833.59 ml   Output --   Net 4833.59 ml     Exam:  General appearance: alert, appears stated age, and cooperative  Head: Normocephalic, without obvious abnormality, atraumatic  Eyes: conjunctivae/corneas clear. PERRL, EOM's intact. Fundi benign. Throat: lips, mucosa, and tongue normal; teeth and gums normal  Neck: no adenopathy, no carotid bruit, no JVD, supple, symmetrical, trachea midline, and thyroid not enlarged, symmetric, no tenderness/mass/nodules  Back: symmetric, no curvature. ROM normal. No CVA tenderness.   Lungs: clear to auscultation bilaterally  Chest wall: no tenderness  Heart: regular rate and rhythm  Abdomen: soft, non-tender; bowel sounds normal; no masses,  no organomegaly  Extremities: extremities normal, atraumatic, no cyanosis or edema  Pulses: 2+ and symmetric  Skin: Skin color, texture, turgor normal. No rashes or lesions  Lymph nodes: Cervical, supraclavicular, and axillary nodes normal.  Neurologic: Grossly normal      Imaging     Chest  Xray:  Results for orders placed during the hospital encounter of 19    XR CHEST STANDARD (2 VW)    Narrative  Patient MRN: [id number]  : 1955  Age:  60 years  Gender: Female  Order Date: 2019 9:00 AM  Exam: XR CHEST (2 VW)  Number of Images: 2 views  Indication:   cough  cough  Comparison: 2018    Findings: The heart is borderline  The lung fields demonstrate no significant pulmonary vascular  congestion and edema. The aorta is tortuous ectatic and calcified. There are infiltrates throughout the lung fields which are likely  chronic. There are infiltrates seen in the perihilar regions and at  the right lung base as well as the left lung base not convincingly  changed  Right trice is enlarged, this appears to be a chronic finding. Impression  Cardiomegaly  Findings compatible with atherosclerotic disease of the aorta. No acute infiltrate    Results for orders placed during the hospital encounter of 22    XR CHEST PORTABLE    Narrative  EXAMINATION:  ONE XRAY VIEW OF THE CHEST    8/3/2022 7:35 pm    COMPARISON:  2022    HISTORY:  ORDERING SYSTEM PROVIDED HISTORY: eval pneumonia  TECHNOLOGIST PROVIDED HISTORY:  Reason for exam:->eval pneumonia    FINDINGS:  The lungs are without acute focal process. There is no effusion or  pneumothorax. The cardiomediastinal silhouette is stable. The osseous  structures are stable. Impression  No acute process.       Additional Imaging:  none    Lab Review   Recent Results (from the past 24 hour(s))   Basic Metabolic Panel    Collection Time: 22  4:15 AM   Result Value Ref Range    Sodium 138 132 - 146 mmol/L    Potassium 4.3 3.5 - 5.0 mmol/L Chloride 109 (H) 98 - 107 mmol/L    CO2 15 (L) 22 - 29 mmol/L    Anion Gap 14 7 - 16 mmol/L    Glucose 109 (H) 74 - 99 mg/dL    BUN 18 6 - 23 mg/dL    Creatinine 0.7 0.5 - 1.0 mg/dL    GFR Non-African American >60 >=60 mL/min/1.73    GFR African American >60     Calcium 8.5 (L) 8.6 - 10.2 mg/dL     Assessment:     Principal Problem:    TRACI (acute kidney injury) ([facility]  Active Problems:    Immunosuppressed status (HCC)    Moderate protein-calorie malnutrition (HCC)    Folic acid deficiency    Megaloblastic anemia    Idiopathic pulmonary fibrosis (HCC)    Seizure disorder (HCC)    Osteoporosis    Acute on chronic kidney failure (HCC)    Essential hypertension  Resolved Problems:    * No resolved hospital problems.  *      Plan:   From a cardiopulmonary status she is at her baseline  Still weak  Needs [facility]  Ready when bed is available    Jj Bey MD  8/7/2022  5:27 AM

## 2022-08-07 NOTE — PLAN OF CARE
Problem: Discharge Planning  Goal: Discharge to home or other facility with appropriate resources  Outcome: Progressing  Flowsheets (Taken 8/6/2022 2109)  Discharge to home or other facility with appropriate resources: Identify barriers to discharge with patient and caregiver     Problem: Skin/Tissue Integrity  Goal: Absence of new skin breakdown  Description: 1. Monitor for areas of redness and/or skin breakdown  2. Assess vascular access sites hourly  3. Every 4-6 hours minimum:  Change oxygen saturation probe site  4. Every 4-6 hours:  If on nasal continuous positive airway pressure, respiratory therapy assess nares and determine need for appliance change or resting period.   Outcome: Progressing     Problem: Safety - Adult  Goal: Free from fall injury  Outcome: Progressing     Problem: Chronic Conditions and Co-morbidities  Goal: Patient's chronic conditions and co-morbidity symptoms are monitored and maintained or improved  Outcome: Progressing  Flowsheets (Taken 8/6/2022 2109)  Care Plan - Patient's Chronic Conditions and Co-Morbidity Symptoms are Monitored and Maintained or Improved: Monitor and assess patient's chronic conditions and comorbid symptoms for stability, deterioration, or improvement     Problem: Nutrition Deficit:  Goal: Optimize nutritional status  Outcome: Progressing

## 2022-08-08 LAB
ANION GAP SERPL CALCULATED.3IONS-SCNC: 12 MMOL/L (ref 7–16)
BUN BLDV-MCNC: 12 MG/DL (ref 6–23)
CALCIUM SERPL-MCNC: 8.2 MG/DL (ref 8.6–10.2)
CHLORIDE BLD-SCNC: 110 MMOL/L (ref 98–107)
CO2: 17 MMOL/L (ref 22–29)
CREAT SERPL-MCNC: 0.7 MG/DL (ref 0.5–1)
GFR AFRICAN AMERICAN: >60
GFR NON-AFRICAN AMERICAN: >60 ML/MIN/1.73
GLUCOSE BLD-MCNC: 100 MG/DL (ref 74–99)
HCT VFR BLD CALC: 28.8 % (ref 34–48)
HEMOGLOBIN: 9.4 G/DL (ref 11.5–15.5)
MCH RBC QN AUTO: 34.7 PG (ref 26–35)
MCHC RBC AUTO-ENTMCNC: 32.6 % (ref 32–34.5)
MCV RBC AUTO: 106.3 FL (ref 80–99.9)
PDW BLD-RTO: 13.3 FL (ref 11.5–15)
PLATELET # BLD: 224 E9/L (ref 130–450)
PMV BLD AUTO: 10 FL (ref 7–12)
POTASSIUM SERPL-SCNC: 3.7 MMOL/L (ref 3.5–5)
RBC # BLD: 2.71 E12/L (ref 3.5–5.5)
SODIUM BLD-SCNC: 139 MMOL/L (ref 132–146)
WBC # BLD: 7.2 E9/L (ref 4.5–11.5)

## 2022-08-08 PROCEDURE — 2580000003 HC RX 258: Performed by: INTERNAL MEDICINE

## 2022-08-08 PROCEDURE — 6360000002 HC RX W HCPCS: Performed by: INTERNAL MEDICINE

## 2022-08-08 PROCEDURE — 96361 HYDRATE IV INFUSION ADD-ON: CPT

## 2022-08-08 PROCEDURE — 36415 COLL VENOUS BLD VENIPUNCTURE: CPT

## 2022-08-08 PROCEDURE — 96376 TX/PRO/DX INJ SAME DRUG ADON: CPT

## 2022-08-08 PROCEDURE — G0378 HOSPITAL OBSERVATION PER HR: HCPCS

## 2022-08-08 PROCEDURE — 2580000003 HC RX 258: Performed by: EMERGENCY MEDICINE

## 2022-08-08 PROCEDURE — 80048 BASIC METABOLIC PNL TOTAL CA: CPT

## 2022-08-08 PROCEDURE — 6370000000 HC RX 637 (ALT 250 FOR IP): Performed by: INTERNAL MEDICINE

## 2022-08-08 PROCEDURE — 96372 THER/PROPH/DIAG INJ SC/IM: CPT

## 2022-08-08 PROCEDURE — 85027 COMPLETE CBC AUTOMATED: CPT

## 2022-08-08 RX ADMIN — LACOSAMIDE 150 MG: 100 TABLET, FILM COATED ORAL at 06:32

## 2022-08-08 RX ADMIN — LEVETIRACETAM 1500 MG: 100 SOLUTION ORAL at 17:07

## 2022-08-08 RX ADMIN — FOLIC ACID 1 MG: 1 TABLET ORAL at 09:09

## 2022-08-08 RX ADMIN — PRIMIDONE 150 MG: 50 TABLET ORAL at 09:08

## 2022-08-08 RX ADMIN — SODIUM CHLORIDE: 9 INJECTION, SOLUTION INTRAVENOUS at 03:02

## 2022-08-08 RX ADMIN — TACROLIMUS 3 MG: 1 CAPSULE ORAL at 09:08

## 2022-08-08 RX ADMIN — SODIUM CHLORIDE: 9 INJECTION, SOLUTION INTRAVENOUS at 14:19

## 2022-08-08 RX ADMIN — FAMOTIDINE 10 MG: 20 TABLET ORAL at 09:09

## 2022-08-08 RX ADMIN — CARVEDILOL 6.25 MG: 6.25 TABLET, FILM COATED ORAL at 17:07

## 2022-08-08 RX ADMIN — LACOSAMIDE 150 MG: 100 TABLET, FILM COATED ORAL at 14:19

## 2022-08-08 RX ADMIN — ACETAMINOPHEN 650 MG: 325 TABLET ORAL at 11:34

## 2022-08-08 RX ADMIN — Medication 10 ML: at 09:10

## 2022-08-08 RX ADMIN — HEPARIN SODIUM 5000 UNITS: 10000 INJECTION INTRAVENOUS; SUBCUTANEOUS at 09:18

## 2022-08-08 RX ADMIN — CARVEDILOL 6.25 MG: 6.25 TABLET, FILM COATED ORAL at 09:09

## 2022-08-08 RX ADMIN — AMLODIPINE BESYLATE 10 MG: 10 TABLET ORAL at 09:09

## 2022-08-08 RX ADMIN — Medication 10 ML: at 22:02

## 2022-08-08 RX ADMIN — HEPARIN SODIUM 5000 UNITS: 10000 INJECTION INTRAVENOUS; SUBCUTANEOUS at 22:01

## 2022-08-08 RX ADMIN — LEVETIRACETAM 1500 MG: 100 SOLUTION ORAL at 06:33

## 2022-08-08 RX ADMIN — PRIMIDONE 150 MG: 50 TABLET ORAL at 22:01

## 2022-08-08 RX ADMIN — PREDNISONE 5 MG: 5 TABLET ORAL at 09:09

## 2022-08-08 RX ADMIN — WATER 1000 MG: 1 INJECTION INTRAMUSCULAR; INTRAVENOUS; SUBCUTANEOUS at 09:18

## 2022-08-08 RX ADMIN — LACOSAMIDE 150 MG: 100 TABLET, FILM COATED ORAL at 22:01

## 2022-08-08 ASSESSMENT — PAIN DESCRIPTION - LOCATION: LOCATION: GENERALIZED

## 2022-08-08 ASSESSMENT — PAIN SCALES - GENERAL: PAINLEVEL_OUTOF10: 3

## 2022-08-08 NOTE — PROGRESS NOTES
Internal Medicine  Progress Note  Yosef Noble MD     Subjective:     Patient is alert. Patient reports OK. Tolerating diet well no other c/o. Scheduled Meds:   amLODIPine  10 mg Oral QAM    carvedilol  6.25 mg Oral BID WC    famotidine  10 mg Oral Daily    levETIRAcetam  1,500 mg Oral BID    predniSONE  5 mg Oral QAM    primidone  150 mg Oral BID    tacrolimus  3 mg Oral Daily    cefTRIAXone (ROCEPHIN) IV  1,000 mg IntraVENous Q24H    lacosamide  150 mg Oral 3 times per day    folic acid  1 mg Oral Daily    sodium chloride flush  5-40 mL IntraVENous 2 times per day    heparin (porcine)  5,000 Units SubCUTAneous BID     Continuous Infusions:   sodium chloride      sodium chloride 100 mL/hr at 08/08/22 0302     PRN Meds:white petrolatum, acetaminophen, ondansetron, sodium chloride flush, sodium chloride flush, sodium chloride, [DISCONTINUED] ondansetron **OR** ondansetron    Objective:      Physical Exam:  Vitals:   /69   Pulse 82   Temp 98.6 °F (37 °C) (Oral)   Resp 18   Ht 5' (1.524 m)   Wt 113 lb (51.3 kg)   SpO2 100%   BMI 22.07 kg/m²   Orthostatic BPs and Heart Rates:     I/O's    Intake/Output Summary (Last 24 hours) at 8/8/2022 5297  Last data filed at 8/7/2022 2201  Gross per 24 hour   Intake 1331.74 ml   Output --   Net 1331.74 ml     Exam:  General appearance: alert, appears stated age, and cooperative  Head: Normocephalic, without obvious abnormality, atraumatic  Eyes: negative  Throat: normal findings: lips normal without lesions  Neck: no adenopathy, no carotid bruit, no JVD, and supple, symmetrical, trachea midline  Back: symmetric, no curvature. ROM normal. No CVA tenderness.   Lungs: clear to auscultation bilaterally  Chest wall: no tenderness  Heart: regular rate and rhythm  Abdomen: soft, non-tender; bowel sounds normal; no masses,  no organomegaly  Extremities: extremities normal, atraumatic, no cyanosis or edema  Pulses: 2+ and symmetric  Skin: Skin color, texture, turgor normal. No rashes or lesions  Lymph nodes: Cervical, supraclavicular, and axillary nodes normal.  Neurologic: Grossly normal      Imaging     Chest  Xray:  Results for orders placed during the hospital encounter of 19    XR CHEST STANDARD (2 VW)    Narrative  Patient MRN: [id number]  : 1955  Age:  60 years  Gender: Female  Order Date: 2019 9:00 AM  Exam: XR CHEST (2 VW)  Number of Images: 2 views  Indication:   cough  cough  Comparison: 2018    Findings: The heart is borderline  The lung fields demonstrate no significant pulmonary vascular  congestion and edema. The aorta is tortuous ectatic and calcified. There are infiltrates throughout the lung fields which are likely  chronic. There are infiltrates seen in the perihilar regions and at  the right lung base as well as the left lung base not convincingly  changed  Right trice is enlarged, this appears to be a chronic finding. Impression  Cardiomegaly  Findings compatible with atherosclerotic disease of the aorta. No acute infiltrate    Results for orders placed during the hospital encounter of 22    XR CHEST PORTABLE    Narrative  EXAMINATION:  ONE XRAY VIEW OF THE CHEST    8/3/2022 7:35 pm    COMPARISON:  2022    HISTORY:  ORDERING SYSTEM PROVIDED HISTORY: eval pneumonia  TECHNOLOGIST PROVIDED HISTORY:  Reason for exam:->eval pneumonia    FINDINGS:  The lungs are without acute focal process. There is no effusion or  pneumothorax. The cardiomediastinal silhouette is stable. The osseous  structures are stable. Impression  No acute process.       Additional Imaging:  none    Lab Review   Recent Results (from the past 24 hour(s))   CBC    Collection Time: 22  8:52 AM   Result Value Ref Range    WBC 5.9 4.5 - 11.5 E9/L    RBC 2.85 (L) 3.50 - 5.50 E12/L    Hemoglobin 9.8 (L) 11.5 - 15.5 g/dL    Hematocrit 30.3 (L) 34.0 - 48.0 %    .3 (H) 80.0 - 99.9 fL    MCH 34.4 26.0 - 35.0 pg    MCHC 32.3 32.0 - 34.5 %    RDW 13.0 11.5 - 15.0 fL    Platelets 383 726 - 249 E9/L    MPV 9.6 7.0 - 12.0 fL   CBC    Collection Time: 08/08/22  4:36 AM   Result Value Ref Range    WBC 7.2 4.5 - 11.5 E9/L    RBC 2.71 (L) 3.50 - 5.50 E12/L    Hemoglobin 9.4 (L) 11.5 - 15.5 g/dL    Hematocrit 28.8 (L) 34.0 - 48.0 %    .3 (H) 80.0 - 99.9 fL    MCH 34.7 26.0 - 35.0 pg    MCHC 32.6 32.0 - 34.5 %    RDW 13.3 11.5 - 15.0 fL    Platelets 396 991 - 903 E9/L    MPV 10.0 7.0 - 12.0 fL   Basic Metabolic Panel    Collection Time: 08/08/22  4:36 AM   Result Value Ref Range    Sodium 139 132 - 146 mmol/L    Potassium 3.7 3.5 - 5.0 mmol/L    Chloride 110 (H) 98 - 107 mmol/L    CO2 17 (L) 22 - 29 mmol/L    Anion Gap 12 7 - 16 mmol/L    Glucose 100 (H) 74 - 99 mg/dL    BUN 12 6 - 23 mg/dL    Creatinine 0.7 0.5 - 1.0 mg/dL    GFR Non-African American >60 >=60 mL/min/1.73    GFR African American >60     Calcium 8.2 (L) 8.6 - 10.2 mg/dL     Assessment:     Principal Problem:    TRACI (acute kidney injury) (HCC)  Active Problems:    Immunosuppressed status (HCC)    Moderate protein-calorie malnutrition (HCC)    Folic acid deficiency    Megaloblastic anemia    Idiopathic pulmonary fibrosis (HCC)    Seizure disorder (HCC)    Osteoporosis    Acute on chronic kidney failure (HCC)    Essential hypertension  Resolved Problems:    * No resolved hospital problems.  *      Plan:   TO [facility] if bed available today  Sachi Wade MD  8/8/2022  6:14 AM

## 2022-08-08 NOTE — DISCHARGE INSTRUCTIONS
HEART FAILURE  / CONGESTIVE HEART FAILURE  DISCHARGE INSTRUCTIONS:  GUIDELINES TO FOLLOW AT HOME    Self- Managed Care:     MEDICATIONS:  Take your medication as directed. If you are experiencing any side effects, inform your doctor, Do not stop taking any of your medications without letting your doctor know. Check with your doctor before taking any over-the-counter medications / herbal / or dietary supplements. They may interfere with your other medications. Do not take ibuprofen (Advil or Motrin) and naproxen (Aleve) without talking to your doctor first. They could make your heart failure worse. WEIGHT MONITORING:   Weigh yourself everyday (with the same scale) around the same time of the day and write it down. (you can chart them on a calendar or keep track of them on paper. Notify your doctor of a weight gain of 3 pounds or more in 1 day   OR a total of 5 pounds or more in 1 week    Take your weight record to your doctor visits  Also, the same goes if you loose more than 3# in one day, let your heart doctor know. DIET:   Cardiac heart healthy diet- Low saturated / low trans fat, no added salt, caffeine restricted, Low sodium diet-   No more than 2,000mg (2 grams) of salt / sodium per day (which equals to a little less than  a teaspoon of salt)  If your doctor wants you on a fluid restriction. ..it is usually recommended a fluid limit of 2,000cc -  Fluid restriction- 2,000 ml (milliliters) = 64 ounces = you can have 8 glasses of fluid per day (each glass 8 ounces)    Follow a low salt diet - avoid using salt at the table, avoid / limit use of canned soups, processed / packaged foods, salted snacks, olives and pickles. Do not use a salt substitute without checking with your doctor, they may contain a high amount of potassioum. (Mrs. Ludin Rudd is safe to use).     Limit the use of alcohol       CALL YOUR DOCTOR THE FIRST DAY YOU NOTICE ANY OF THESE   SYMPTOMS:  You have a weight gain of 3 pounds or more in 1 day         OR 5 pounds or more in one week  More shortness of breath  More swelling of your stomach, legs, ankles or feet  Feeling more tired, No energy  Dry hacky cough  Dizziness  More chest pain / discomfort       (CALL 670 IF ANY OF THE FOLLOWING OCCURS  Chest pain (not relieved with nitroglycerine, if you have been prescribed this medication)  Severe shortness of breath  Faint / Pass out  Confusion / cannot think clearly  If symptoms get worse           SMOKING - TOBACCO USE:  * IF YOU SMOKE - STOP! Kick the habit. [address] Program is offered at [facility] and [address] [facility]. Call [phone] extension [phone] for more information. ACTIVITY:   (Ask your doctor when you will be able to return to work and before starting any exercise program.  Do not drive unless unless your doctor has given you permission to do so). Start light exercise. Even if you can only do a small amount, exercise will help you get stronger, have more energy, help manage your weight and decrease  stress. Walking is an easy way to get exercise. Start out slowly and  increase the amount you walk as tolerated  If you become short of breath, dizzy or have chest pain; stop, sit down, and rest.  If you feel \"wiped out\" the day after you exercise, walk at a slower pace or for a shorter distance. You can gradually increase the pace or amount of time. (Do not exercise right after a meal or in extreme temperatures, such as above 85 degrees, if the air is really humid, or wind chill is less than 20 degrees)                                             ADDITIONAL INFORMATION:  Avoid getting sick from colds and the flu. Stay away from friends or family that you know may have a contagious illness  Get plenty of rest   Get a flu shot each year. Get a pneumococcal vaccine shot.  If you have had one before, ask your doctor whether you need another dose. My Goal for Self-management of Heart Failure Includes 5 steps :    1. Notice a change in symptoms ( weight gain, short of breath, leg swelling, decreased activity level, bloating. ...)    2. Evaluate the change: (use the Heart Failure Zones )     3. Decide to take action: decide what your options are, such as: (call your doctor for an extra visit, take a prescribed medication, such as your water pill if your doctor has given you directions to do so, [address])    4. Come up with a strategy:  (now you call the doctor for advice / appointment. This is where you take action!!! Do not wait, catch the symptom early and treat it before it worsens. 5. Evaluate the response: The next day, check your Heart Failure Zones: are you in the GREEN ZONE (safe zone)? Worsening symptoms of YELLOW ZONE? Or have you moved to the RED ZONE and need to call 911 or go to the Emergency Room for evaluation? Call your doctor's office to update them on your symptoms of heart failure.

## 2022-08-08 NOTE — PLAN OF CARE
Patient's chart updated to reflect:      . - HF care plan, HF education points and HF discharge instructions.  -Orders: 2 gram sodium diet, daily weights, I/O.  -PCP and/or Cardiologist appointment to be scheduled within 7 days of hospital discharge.  -History of HF, not primary admission Dx. Patient admitted for treatment of TRACI.     Sherry Young, RN BSN  Heart Failure Navigator

## 2022-08-08 NOTE — PROGRESS NOTES
Patient was very tired/fatigued at beginning of shift at 7pm to around midnight. Patient started to arouse, asked if she wanted to watch TV denies at time. Patient repositioned throughout night. Around 0300 patient hit call light and asked to use bathroom. Patient put on bed pan and she urinated medium amount of clear urine. Pericare was performed and Hydrocream was placed on buttocks/slight redness noted Patient speaking clearly/freely stated her needs. She was repositioned and continues to be turned q2 hours.  Call light with in reach/ No other needs at the moment

## 2022-08-08 NOTE — PLAN OF CARE
Phone call feeling patient was groggy and confused. She did not appear to me this am nonetheless in light of fluctuating neuro status will cancel discharge.   Will need neuro consult and if they are not in [place] this week will need transferred downtown for neuro eval     Consuella Claude, MD   8/8/2022   11:22 AM

## 2022-08-09 ENCOUNTER — APPOINTMENT (OUTPATIENT)
Dept: GENERAL RADIOLOGY | Age: 67
DRG: 683 | End: 2022-08-09
Payer: COMMERCIAL

## 2022-08-09 LAB
BLOOD CULTURE, ROUTINE: NORMAL
CULTURE, BLOOD 2: NORMAL

## 2022-08-09 PROCEDURE — 2580000003 HC RX 258: Performed by: EMERGENCY MEDICINE

## 2022-08-09 PROCEDURE — 96361 HYDRATE IV INFUSION ADD-ON: CPT

## 2022-08-09 PROCEDURE — 6370000000 HC RX 637 (ALT 250 FOR IP): Performed by: INTERNAL MEDICINE

## 2022-08-09 PROCEDURE — 6360000002 HC RX W HCPCS: Performed by: INTERNAL MEDICINE

## 2022-08-09 PROCEDURE — 97530 THERAPEUTIC ACTIVITIES: CPT

## 2022-08-09 PROCEDURE — 97535 SELF CARE MNGMENT TRAINING: CPT

## 2022-08-09 PROCEDURE — 96372 THER/PROPH/DIAG INJ SC/IM: CPT

## 2022-08-09 PROCEDURE — 73090 X-RAY EXAM OF FOREARM: CPT

## 2022-08-09 PROCEDURE — G0378 HOSPITAL OBSERVATION PER HR: HCPCS

## 2022-08-09 RX ADMIN — AMLODIPINE BESYLATE 10 MG: 10 TABLET ORAL at 10:48

## 2022-08-09 RX ADMIN — HEPARIN SODIUM 5000 UNITS: 10000 INJECTION INTRAVENOUS; SUBCUTANEOUS at 10:57

## 2022-08-09 RX ADMIN — PRIMIDONE 150 MG: 50 TABLET ORAL at 20:50

## 2022-08-09 RX ADMIN — CARVEDILOL 6.25 MG: 6.25 TABLET, FILM COATED ORAL at 16:53

## 2022-08-09 RX ADMIN — CARVEDILOL 6.25 MG: 6.25 TABLET, FILM COATED ORAL at 10:48

## 2022-08-09 RX ADMIN — ACETAMINOPHEN 650 MG: 325 TABLET ORAL at 16:55

## 2022-08-09 RX ADMIN — LACOSAMIDE 150 MG: 100 TABLET, FILM COATED ORAL at 20:50

## 2022-08-09 RX ADMIN — TACROLIMUS 3 MG: 1 CAPSULE ORAL at 10:56

## 2022-08-09 RX ADMIN — PREDNISONE 5 MG: 5 TABLET ORAL at 10:48

## 2022-08-09 RX ADMIN — FAMOTIDINE 10 MG: 20 TABLET ORAL at 10:48

## 2022-08-09 RX ADMIN — LACOSAMIDE 150 MG: 100 TABLET, FILM COATED ORAL at 06:15

## 2022-08-09 RX ADMIN — PRIMIDONE 150 MG: 50 TABLET ORAL at 10:57

## 2022-08-09 RX ADMIN — ACETAMINOPHEN 650 MG: 325 TABLET ORAL at 04:55

## 2022-08-09 RX ADMIN — SODIUM CHLORIDE: 9 INJECTION, SOLUTION INTRAVENOUS at 11:05

## 2022-08-09 RX ADMIN — LEVETIRACETAM 1500 MG: 100 SOLUTION ORAL at 20:51

## 2022-08-09 RX ADMIN — SODIUM CHLORIDE: 9 INJECTION, SOLUTION INTRAVENOUS at 01:03

## 2022-08-09 RX ADMIN — LEVETIRACETAM 1500 MG: 100 SOLUTION ORAL at 06:15

## 2022-08-09 RX ADMIN — FOLIC ACID 1 MG: 1 TABLET ORAL at 10:48

## 2022-08-09 RX ADMIN — LACOSAMIDE 150 MG: 100 TABLET, FILM COATED ORAL at 16:53

## 2022-08-09 ASSESSMENT — PAIN DESCRIPTION - DESCRIPTORS
DESCRIPTORS: ACHING
DESCRIPTORS: SHARP
DESCRIPTORS: ACHING;DULL;DISCOMFORT

## 2022-08-09 ASSESSMENT — PAIN DESCRIPTION - LOCATION
LOCATION: NECK
LOCATION: ARM
LOCATION: ARM

## 2022-08-09 ASSESSMENT — PAIN SCALES - GENERAL
PAINLEVEL_OUTOF10: 4
PAINLEVEL_OUTOF10: 2
PAINLEVEL_OUTOF10: 5
PAINLEVEL_OUTOF10: 8
PAINLEVEL_OUTOF10: 0

## 2022-08-09 ASSESSMENT — PAIN DESCRIPTION - ORIENTATION
ORIENTATION: LEFT
ORIENTATION: MID
ORIENTATION: LEFT

## 2022-08-09 ASSESSMENT — PAIN DESCRIPTION - PAIN TYPE: TYPE: ACUTE PAIN

## 2022-08-09 ASSESSMENT — PAIN - FUNCTIONAL ASSESSMENT
PAIN_FUNCTIONAL_ASSESSMENT: PREVENTS OR INTERFERES SOME ACTIVE ACTIVITIES AND ADLS
PAIN_FUNCTIONAL_ASSESSMENT: PREVENTS OR INTERFERES SOME ACTIVE ACTIVITIES AND ADLS

## 2022-08-09 NOTE — PROGRESS NOTES
Patient complaining of pain to her left forarm. Small ecchymotic area noted, blue in color. She denies any injury. States it just started hurting. Dr Raf Smith notified, awaiting reply.  at the bedside.

## 2022-08-09 NOTE — PLAN OF CARE
Problem: Discharge Planning  Goal: Discharge to home or other facility with appropriate resources  8/9/2022 1313 by Jonna Jonas RN  Outcome: Progressing  8/9/2022 1313 by Jonna Jonas RN  Outcome: Progressing     Problem: Skin/Tissue Integrity  Goal: Absence of new skin breakdown  8/9/2022 1313 by Jonna Jonas RN  Outcome: Progressing  8/9/2022 1313 by Jonna Jonas RN  Outcome: Progressing     Problem: Safety - Adult  Goal: Free from fall injury  8/9/2022 1313 by Jonna Jonas RN  Outcome: Progressing  8/9/2022 1313 by Jonna Jonas RN  Outcome: Progressing  Flowsheets (Taken 8/9/2022 0830)  Free From Fall Injury: Instruct family/caregiver on patient safety     Problem: Chronic Conditions and Co-morbidities  Goal: Patient's chronic conditions and co-morbidity symptoms are monitored and maintained or improved  8/9/2022 1313 by Jonna Jonas RN  Outcome: Progressing  8/9/2022 1313 by Jonna Jonas RN  Outcome: Progressing     Problem: Nutrition Deficit:  Goal: Optimize nutritional status  8/9/2022 1313 by Jonna Jonas RN  Outcome: Progressing  8/9/2022 1313 by Jonna Jonas RN  Outcome: Progressing

## 2022-08-09 NOTE — PROGRESS NOTES
Internal Medicine  Progress Note  Deni Jacob MD     Subjective:     Patient is alert. Patient reports OK. Tolerating diet well no other c/o. Scheduled Meds:   amLODIPine  10 mg Oral QAM    carvedilol  6.25 mg Oral BID WC    famotidine  10 mg Oral Daily    levETIRAcetam  1,500 mg Oral BID    predniSONE  5 mg Oral QAM    primidone  150 mg Oral BID    tacrolimus  3 mg Oral Daily    lacosamide  150 mg Oral 3 times per day    folic acid  1 mg Oral Daily    sodium chloride flush  5-40 mL IntraVENous 2 times per day    heparin (porcine)  5,000 Units SubCUTAneous BID     Continuous Infusions:   sodium chloride      sodium chloride 100 mL/hr at 08/09/22 0617     PRN Meds:white petrolatum, acetaminophen, ondansetron, sodium chloride flush, sodium chloride flush, sodium chloride, [DISCONTINUED] ondansetron **OR** ondansetron    Objective:      Physical Exam:  Vitals:   /76   Pulse 89   Temp 98.1 °F (36.7 °C) (Oral)   Resp 18   Ht 5' (1.524 m)   Wt 113 lb 9.6 oz (51.5 kg)   SpO2 91%   BMI 22.19 kg/m²   Orthostatic BPs and Heart Rates:     I/O's    Intake/Output Summary (Last 24 hours) at 8/9/2022 1377  Last data filed at 8/9/2022 0750  Gross per 24 hour   Intake 3068.47 ml   Output 1100 ml   Net 1968.47 ml     Exam:  General appearance: alert, appears stated age, and cooperative  Head: Normocephalic, without obvious abnormality, atraumatic  Eyes: conjunctivae/corneas clear. PERRL, EOM's intact. Fundi benign.   Throat: lips, mucosa, and tongue normal; teeth and gums normal  Neck: no adenopathy, no carotid bruit, no JVD, and supple, symmetrical, trachea midline  Back: negative  Lungs: clear to auscultation bilaterally  Chest wall: no tenderness  Heart: irregularly irregular rhythm  Abdomen: soft, non-tender; bowel sounds normal; no masses,  no organomegaly  Extremities: extremities normal, atraumatic, no cyanosis or edema  Pulses: 2+ and symmetric  Skin: Skin color, texture, turgor normal. No rashes or lesions  Lymph nodes: Cervical, supraclavicular, and axillary nodes normal.  Neurologic: Grossly normal      Imaging     Chest  Xray:  Results for orders placed during the hospital encounter of 19    XR CHEST STANDARD (2 VW)    Narrative  Patient MRN: [id number]  : 1955  Age:  60 years  Gender: Female  Order Date: 2019 9:00 AM  Exam: XR CHEST (2 VW)  Number of Images: 2 views  Indication:   cough  cough  Comparison: 2018    Findings: The heart is borderline  The lung fields demonstrate no significant pulmonary vascular  congestion and edema. The aorta is tortuous ectatic and calcified. There are infiltrates throughout the lung fields which are likely  chronic. There are infiltrates seen in the perihilar regions and at  the right lung base as well as the left lung base not convincingly  changed  Right trice is enlarged, this appears to be a chronic finding. Impression  Cardiomegaly  Findings compatible with atherosclerotic disease of the aorta. No acute infiltrate    Results for orders placed during the hospital encounter of 22    XR CHEST PORTABLE    Narrative  EXAMINATION:  ONE XRAY VIEW OF THE CHEST    8/3/2022 7:35 pm    COMPARISON:  2022    HISTORY:  ORDERING SYSTEM PROVIDED HISTORY: eval pneumonia  TECHNOLOGIST PROVIDED HISTORY:  Reason for exam:->eval pneumonia    FINDINGS:  The lungs are without acute focal process. There is no effusion or  pneumothorax. The cardiomediastinal silhouette is stable. The osseous  structures are stable. Impression  No acute process. Additional Imaging:  none    Lab Review   No results found for this or any previous visit (from the past 24 hour(s)).   Assessment:     Principal Problem:    TRACI (acute kidney injury) ([address]  Active Problems:    Immunosuppressed status ([address]    Moderate protein-calorie malnutrition (HCC)    Folic acid deficiency    Megaloblastic anemia    Idiopathic pulmonary fibrosis (HCC)    Seizure disorder ([address] Osteoporosis    Acute on chronic kidney failure ([facility]    Essential hypertension  Resolved Problems:    * No resolved hospital problems.  *      Plan:   Talked with pt extended period this am  She certainly appears to be at her neuro baseline  Although not my primary pt I have sen her multiple times thru the years - she cognitively and neurologically seems to be at her baseline  I do not believe she needs neuro eval at present  She appears baseline  HOWEVER due to partial complex seizures if she in the future requires hospitalization or ER eval DO NOT come to [facility] as there is no neuro coverage - any and all ER visits and admissions should be to [facility]    All that said ready for discharge  David Connelly MD  8/9/2022  6:21 AM

## 2022-08-09 NOTE — Clinical Note
Call placed to Dr Sharla Traore, spoke with Fili Crespo with answering service. Patients speech is slightly slurred. Mental status change noted.

## 2022-08-09 NOTE — CARE COORDINATION
Transition of Care-Met with  and patient bedside, requested referral to [facility], second choice [facility]. Referral made to [facility], cannot accept until MRI Brain results to see if patient is skilled level of care. [facility] to [facility] to [facility] as skilled, however can take long term, unable to determine this until MRI done. Patient would benefit from transfer to [facility] for proper Neurology work up, as she is scoring below skilled level (PT-8). Sw/Cm following closely.     Fernie Sandhu BSN, RN  [facility]

## 2022-08-09 NOTE — PROGRESS NOTES
Occupational Therapy  OT BEDSIDE TREATMENT NOTE      Date:2022  Patient Name: Tianna Wilson  MRN: [id number]  : 1955  Room: [address] [place], [place] [address]     Evaluating OT: Naty Varner OTR/L   [id number]       Referring Sonia Hall MD    Specific Provider Orders/Date:OT eval and treat 2022       Diagnosis:  TRACI (acute kidney injury) ([facility] [N17.9]  Other fatigue [R53.83]     Pertinent Medical History: hemiplegia, dialysis, CHF, falls, osteoporosis , back surgery, CVA, seizure     Precautions:  Fall Risk      Assessment of current deficits   [x] Functional mobility            [x]ADLs           [x] Strength                  [x]Cognition   [x] Functional transfers          [x] IADLs         [x] Safety Awareness   [x]Endurance   [] Fine Coordination                         [x] Balance      [] Vision/perception   []Sensation      []Gross Motor Coordination             [] ROM           [] Delirium                   [] Motor Control     OT PLAN OF CARE   OT POC based on physician orders, patient diagnosis and results of clinical assessment     Frequency/Duration  2-4 days/wk for 2 weeks PRN  Specific OT Treatment Interventions to include:   ADL retraining/adapted techniques and AE recommendations to increase functional independence within precautions                    Energy conservation techniques to improve tolerance for selfcare routine  Functional transfer/mobility training/DME recommendations for increased independence, safety and fall prevention         Patient/family education to increase safety and functional independence             Environmental modifications for safe mobility and completion of ADLs                             Therapeutic activity to improve functional performance during ADLs.                                          Therapeutic exercise to improve tolerance and functional strength for ADLs   Balance retraining/tolerance tasks for facilitation of postural control with dynamic challenges during ADLs . Positioning to improve functional independence  Cognitive retraining ex's to improve problem solving skills & safe participation in ADLs/IADLs           Recommended Adaptive Equipment: continue to assess      Home Living: Pt lives with , 1 story with ramp    Bathroom setup: tub/shower . Tub bench, elevated toilet seat    Equipment owned: walker , wheelchair, raised toilet seat      Prior Level of Function: recent assist as  with ADLs , assist  with IADLs; ambulated with walker         Pain Level: pt initially denied having pain however complained of pain with all movement. Cognition: Awake. Answers questions with limited verbalization and soft spoken words. Functional Assessment:  AM-PAC Daily Activity Raw Score: 9/24    Initial Eval Status  Date: 8/4/22 Treatment Status  Date:8/9/22 STGs = LTGs  Time frame: 10-14 days   Feeding Min A  max A  Supervision    Grooming Mod A,seated  Decrease standing balance/tolerance     Patient cued to lift her head up - neck flexed position -  reports this has been patient's position more recently  max A  SBA    UB Dressing Max A Max A to change gown when seated on the side of the bed. SBA    LB Dressing Max A Dependent   Min A   Bathing Max A   Min A    Toileting Max A Dependent. Incontinent of bowel. Min A    Bed Mobility  Max A  Supine <>sit Max A supine to sit   CGA   Functional Transfers Max A  Sit - stand from bed Max A sit to stand for ADL and transfer. Min A    Functional Mobility Attempted side steps to [facility]  Max A using walket  Patient fatigued , feeling alittle dizzy - returned to bed Max A pivot to the chair. Min A with good tolerance   Balance Sitting:    Static:  Min A- neck flexed     Dynamic:Max  A  Standing: Max A Poor sit and stand balance.      SBA - sitting  Min/CGA - standing    Activity Tolerance Poor with light activity  No SOB noted Poor   Fair + with ADL activity         UE ROM/strength UE ROM present throughout UES  Weakness throughout  poor use of UE's for support during functional activity and as support while seated on the side of the bed. Tolerate UE ROM therapeutic activity/exercises to increase strength and endurance for ADL/xfer activity      Comments:  Pt laying in the bed.  present and sitting in the couch. Per nursing, pt needs to be up in the chair today. Completed ADL while seated on the side of the bed. Pt incontinent. Brief and matteo hygiene completed. Max A for balance while standing 3x during ADL activity. Max A to pivot to recliner chair. Wedges placed laterally for support. Towel roll for neck support. Pt remained seated in chair with alarm activated and family present. Education/treatment:  ADL retraining with facilitation of movement to increase self care skills. Therapeutic activity to address balance and endurance for ADL and transfers. Pt education of daily orientation and transfer safety. Pt has made  progress towards set goals.        Time In: 10:08  Time Out: 10:33      Min Units   Therapeutic Ex [id number]     Therapeutic Activities [id number]   ADL/Self Care 14394 10 1   Orthotic Management [id number]     Neuro Re-Ed [id number]     Non-Billable Time     TOTAL TIMED TREATMENT [facility] MARY LOU/L [id number]

## 2022-08-09 NOTE — PROGRESS NOTES
Physical Therapy  Facility/Department: [facility]  Physical Therapy Treatment Note  Name: Marco Antonio Mcmanus  : 1955  MRN: [id number]  Date of Service: 2022      Patient Diagnosis(es): The primary encounter diagnosis was TRACI (acute kidney injury) ([address] A diagnosis of Other fatigue was also pertinent to this visit. Past Medical History:  has a past medical history of Acute on chronic respiratory failure with hypoxia ([address] Anemia, Cardiomegaly, CHF (congestive heart failure) ([address] Chronic kidney disease, Constipation, Diaphragmatic hernia, Diverticulitis, Epilepsy ([address] Falls, GERD (gastroesophageal reflux disease), Hemiparesis ([address] Hemiplegia ([address] Hemodialysis patient ([address] History of blood transfusion, History of lung transplant ([address] Hyperparathyroidism ([address] Hypertension, Immunosuppressed status ([address] Insomnia, IPF (idiopathic pulmonary fibrosis) ([address] Kidney stone, Neutropenia ([address] 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 Past Surgical History:  has a past surgical history that includes Lung transplant (); hip surgery; knee surgery; hernia repair; Cholecystectomy; Fixation Kyphoplasty (11/3/2015); Leg Surgery (Left, 2016); Dialysis fistula creation (Right, 2017); back surgery; AV fistula repair (Right, 2017); Colonoscopy; other surgical history (2017); fracture surgery (Left, 2014); joint replacement (Bilateral); joint replacement (Left); and pr anastomosis,av,any site (Right, 2018). Evaluating Therapist: López Li PT    Room #:  3702/2425-I  Diagnosis:  TRACI (acute kidney injury) ([address] [N17.9]  Other fatigue [R53.83]  PMHx/PSHx:  CHF, CKD, hx of lung transplant, Epilepsy  Precautions:  falls, alarm      Social:  Pt lives with  in a 1 floor plan with ramped entrance. Ambulates with ww.       Initial

## 2022-08-09 NOTE — PLAN OF CARE
Problem: Discharge Planning  Goal: Discharge to home or other facility with appropriate resources  8/9/2022 1339 by Imagene Runner, RN  Outcome: Progressing  8/9/2022 1313 by Imagene Runner, RN  Outcome: Progressing  8/9/2022 1313 by Imagene Runner, RN  Outcome: Progressing     Problem: Skin/Tissue Integrity  Goal: Absence of new skin breakdown  8/9/2022 1339 by Imagene Runner, RN  Outcome: Progressing  8/9/2022 1313 by Imagene Runner, RN  Outcome: Progressing  8/9/2022 1313 by Imagene Runner, RN  Outcome: Progressing     Problem: Safety - Adult  Goal: Free from fall injury  8/9/2022 1339 by Imagene Runner, RN  Outcome: Progressing  8/9/2022 1313 by Imagene Runner, RN  Outcome: Progressing  8/9/2022 1313 by Imagene Runner, RN  Outcome: Progressing  Flowsheets (Taken 8/9/2022 0830)  Free From Fall Injury: Instruct family/caregiver on patient safety     Problem: Chronic Conditions and Co-morbidities  Goal: Patient's chronic conditions and co-morbidity symptoms are monitored and maintained or improved  8/9/2022 1339 by Imagene Runner, RN  Outcome: Progressing  8/9/2022 1313 by Imagene Runner, RN  Outcome: Progressing  8/9/2022 1313 by Imagene Runner, RN  Outcome: Progressing     Problem: Nutrition Deficit:  Goal: Optimize nutritional status  8/9/2022 1339 by Imagene Runner, RN  Outcome: Progressing  8/9/2022 1313 by Imagene Runner, RN  Outcome: Progressing  8/9/2022 1313 by Imagene Runner, RN  Outcome: Progressing

## 2022-08-09 NOTE — PLAN OF CARE
Problem: Discharge Planning  Goal: Discharge to home or other facility with appropriate resources  Outcome: Progressing     Problem: Skin/Tissue Integrity  Goal: Absence of new skin breakdown  Outcome: Progressing     Problem: Safety - Adult  Goal: Free from fall injury  Outcome: Progressing  Flowsheets (Taken 8/9/2022 9330)  Free From Fall Injury: Instruct family/caregiver on patient safety     Problem: Chronic Conditions and Co-morbidities  Goal: Patient's chronic conditions and co-morbidity symptoms are monitored and maintained or improved  Outcome: Progressing     Problem: Nutrition Deficit:  Goal: Optimize nutritional status  Outcome: Progressing

## 2022-08-09 NOTE — PROGRESS NOTES
Dr Kavita Mcmullen returned call. Explained change in mental status. MRI of head without contrast ordered.

## 2022-08-10 ENCOUNTER — APPOINTMENT (OUTPATIENT)
Dept: MRI IMAGING | Age: 67
DRG: 683 | End: 2022-08-10
Payer: COMMERCIAL

## 2022-08-10 PROCEDURE — 96372 THER/PROPH/DIAG INJ SC/IM: CPT

## 2022-08-10 PROCEDURE — G0378 HOSPITAL OBSERVATION PER HR: HCPCS

## 2022-08-10 PROCEDURE — 97530 THERAPEUTIC ACTIVITIES: CPT

## 2022-08-10 PROCEDURE — 2580000003 HC RX 258: Performed by: EMERGENCY MEDICINE

## 2022-08-10 PROCEDURE — 2580000003 HC RX 258: Performed by: INTERNAL MEDICINE

## 2022-08-10 PROCEDURE — 70551 MRI BRAIN STEM W/O DYE: CPT

## 2022-08-10 PROCEDURE — 6370000000 HC RX 637 (ALT 250 FOR IP): Performed by: INTERNAL MEDICINE

## 2022-08-10 PROCEDURE — 96361 HYDRATE IV INFUSION ADD-ON: CPT

## 2022-08-10 PROCEDURE — 6360000002 HC RX W HCPCS: Performed by: INTERNAL MEDICINE

## 2022-08-10 PROCEDURE — 2700000000 HC OXYGEN THERAPY PER DAY

## 2022-08-10 RX ORDER — COLCHICINE 0.6 MG/1
0.6 TABLET ORAL ONCE
Status: COMPLETED | OUTPATIENT
Start: 2022-08-10 | End: 2022-08-10

## 2022-08-10 RX ADMIN — CARVEDILOL 6.25 MG: 6.25 TABLET, FILM COATED ORAL at 11:56

## 2022-08-10 RX ADMIN — LEVETIRACETAM 1500 MG: 100 SOLUTION ORAL at 16:29

## 2022-08-10 RX ADMIN — SODIUM CHLORIDE: 9 INJECTION, SOLUTION INTRAVENOUS at 20:41

## 2022-08-10 RX ADMIN — HEPARIN SODIUM 5000 UNITS: 10000 INJECTION INTRAVENOUS; SUBCUTANEOUS at 11:58

## 2022-08-10 RX ADMIN — LEVETIRACETAM 1500 MG: 100 SOLUTION ORAL at 05:31

## 2022-08-10 RX ADMIN — FAMOTIDINE 10 MG: 20 TABLET ORAL at 11:51

## 2022-08-10 RX ADMIN — PRIMIDONE 150 MG: 50 TABLET ORAL at 11:51

## 2022-08-10 RX ADMIN — CARVEDILOL 6.25 MG: 6.25 TABLET, FILM COATED ORAL at 16:30

## 2022-08-10 RX ADMIN — LACOSAMIDE 150 MG: 100 TABLET, FILM COATED ORAL at 05:31

## 2022-08-10 RX ADMIN — LACOSAMIDE 150 MG: 100 TABLET, FILM COATED ORAL at 22:19

## 2022-08-10 RX ADMIN — Medication 10 ML: at 11:58

## 2022-08-10 RX ADMIN — AMLODIPINE BESYLATE 10 MG: 10 TABLET ORAL at 11:52

## 2022-08-10 RX ADMIN — COLCHICINE 0.6 MG: 0.6 TABLET ORAL at 17:33

## 2022-08-10 RX ADMIN — HEPARIN SODIUM 5000 UNITS: 10000 INJECTION INTRAVENOUS; SUBCUTANEOUS at 20:42

## 2022-08-10 RX ADMIN — PRIMIDONE 150 MG: 50 TABLET ORAL at 22:19

## 2022-08-10 RX ADMIN — Medication 10 ML: at 20:42

## 2022-08-10 RX ADMIN — TACROLIMUS 3 MG: 1 CAPSULE ORAL at 11:51

## 2022-08-10 RX ADMIN — PREDNISONE 5 MG: 5 TABLET ORAL at 11:52

## 2022-08-10 RX ADMIN — LACOSAMIDE 150 MG: 100 TABLET, FILM COATED ORAL at 16:30

## 2022-08-10 RX ADMIN — FOLIC ACID 1 MG: 1 TABLET ORAL at 11:50

## 2022-08-10 ASSESSMENT — PAIN DESCRIPTION - LOCATION: LOCATION: ARM

## 2022-08-10 ASSESSMENT — PAIN - FUNCTIONAL ASSESSMENT: PAIN_FUNCTIONAL_ASSESSMENT: PREVENTS OR INTERFERES WITH MANY ACTIVE NOT PASSIVE ACTIVITIES

## 2022-08-10 ASSESSMENT — PAIN DESCRIPTION - ORIENTATION: ORIENTATION: RIGHT

## 2022-08-10 ASSESSMENT — PAIN SCALES - GENERAL
PAINLEVEL_OUTOF10: 9
PAINLEVEL_OUTOF10: 0

## 2022-08-10 ASSESSMENT — PAIN DESCRIPTION - DESCRIPTORS: DESCRIPTORS: ACHING;DULL;DISCOMFORT

## 2022-08-10 NOTE — PROGRESS NOTES
MRI needs to know what type of brain surgery pt had as answered on MRI checklist, attempted to call  to clarify, left message

## 2022-08-10 NOTE — PLAN OF CARE
Problem: Safety - Adult  Goal: Free from fall injury  Outcome: Progressing     Problem: Chronic Conditions and Co-morbidities  Goal: Patient's chronic conditions and co-morbidity symptoms are monitored and maintained or improved  Outcome: Progressing     Problem: Pain  Goal: Verbalizes/displays adequate comfort level or baseline comfort level  Outcome: Progressing

## 2022-08-10 NOTE — PLAN OF CARE
Problem: Discharge Planning  Goal: Discharge to home or other facility with appropriate resources  8/10/2022 1210 by Puneet Palacios RN  Outcome: Progressing  8/10/2022 1210 by Puneet Palacios RN  Outcome: Progressing     Problem: Skin/Tissue Integrity  Goal: Absence of new skin breakdown  8/10/2022 1210 by Puneet Palacios RN  Outcome: Progressing  8/10/2022 1210 by Puneet Palacios RN  Outcome: Progressing     Problem: Safety - Adult  Goal: Free from fall injury  8/10/2022 1210 by Puneet Palacios RN  Outcome: Progressing  8/10/2022 1210 by Puneet Palacios RN  Outcome: Progressing  Flowsheets (Taken 8/10/2022 0800)  Free From Fall Injury: Instruct family/caregiver on patient safety  8/10/2022 0617 by Kj Bell RN  Outcome: Progressing     Problem: Chronic Conditions and Co-morbidities  Goal: Patient's chronic conditions and co-morbidity symptoms are monitored and maintained or improved  8/10/2022 1210 by Puneet Palacios RN  Outcome: Progressing  8/10/2022 1210 by Puneet Palacios RN  Outcome: Progressing  8/10/2022 0617 by Kj Bell RN  Outcome: Progressing     Problem: Nutrition Deficit:  Goal: Optimize nutritional status  8/10/2022 1210 by Puneet Palacios RN  Outcome: Progressing  8/10/2022 1210 by Puneet Palacios RN  Outcome: Progressing     Problem: Pain  Goal: Verbalizes/displays adequate comfort level or baseline comfort level  8/10/2022 1210 by Puneet Palacios RN  Outcome: Progressing  8/10/2022 1210 by Puneet Palacios RN  Outcome: Progressing  8/10/2022 0617 by Kj Bell RN  Outcome: Progressing

## 2022-08-10 NOTE — PROGRESS NOTES
normal.  Neurologic: Grossly normal      Imaging     Chest  Xray:  Results for orders placed during the hospital encounter of 19    XR CHEST STANDARD (2 VW)    Narrative  Patient MRN: [id number]  : 1955  Age:  60 years  Gender: Female  Order Date: 2019 9:00 AM  Exam: XR CHEST (2 VW)  Number of Images: 2 views  Indication:   cough  cough  Comparison: 2018    Findings: The heart is borderline  The lung fields demonstrate no significant pulmonary vascular  congestion and edema. The aorta is tortuous ectatic and calcified. There are infiltrates throughout the lung fields which are likely  chronic. There are infiltrates seen in the perihilar regions and at  the right lung base as well as the left lung base not convincingly  changed  Right trice is enlarged, this appears to be a chronic finding. Impression  Cardiomegaly  Findings compatible with atherosclerotic disease of the aorta. No acute infiltrate    Results for orders placed during the hospital encounter of 22    XR CHEST PORTABLE    Narrative  EXAMINATION:  ONE XRAY VIEW OF THE CHEST    8/3/2022 7:35 pm    COMPARISON:  2022    HISTORY:  ORDERING SYSTEM PROVIDED HISTORY: eval pneumonia  TECHNOLOGIST PROVIDED HISTORY:  Reason for exam:->eval pneumonia    FINDINGS:  The lungs are without acute focal process. There is no effusion or  pneumothorax. The cardiomediastinal silhouette is stable. The osseous  structures are stable. Impression  No acute process. Additional Imaging:  none    Lab Review   No results found for this or any previous visit (from the past 24 hour(s)).   Assessment:     Principal Problem:    TRACI (acute kidney injury) ([address]  Active Problems:    Immunosuppressed status (HCC)    Moderate protein-calorie malnutrition (HCC)    Folic acid deficiency    Megaloblastic anemia    Idiopathic pulmonary fibrosis (HCC)    Seizure disorder (HCC)    Osteoporosis    Acute on chronic kidney failure ([address]    Essential hypertension  Resolved Problems:    * No resolved hospital problems. *      Plan:   STILL awaiting MRI 22 hours after ordered!!! It was ordered for mental status change not sure what could keep jumping in front of her for 22 hours? ?? Maybe I am old school but in a hospital setting I anticipated getting an MRI for mental status change qucker than this.   Marco Olmstead MD  8/10/2022  6:18 AM

## 2022-08-10 NOTE — PROGRESS NOTES
Occupational Therapy  OT BEDSIDE TREATMENT NOTE      Date:8/10/2022  Patient Name: Musa Vergara  MRN: [id number]  : 1955  Room: [address] [place], [place] [address]     Evaluating OT: David Weiss OTR/L   [id number]       Referring Brien Santos MD    Specific Provider Orders/Date:OT eval and treat 2022       Diagnosis:  TRACI (acute kidney injury) ([facility] [address] [N17.9]  Other fatigue [R53.83]     Pertinent Medical History: hemiplegia, dialysis, CHF, falls, osteoporosis , back surgery, CVA, seizure     Precautions:  Fall Risk      Assessment of current deficits   [x] Functional mobility            [x]ADLs           [x] Strength                  [x]Cognition   [x] Functional transfers          [x] IADLs         [x] Safety Awareness   [x]Endurance   [] Fine Coordination                         [x] Balance      [] Vision/perception   []Sensation      []Gross Motor Coordination             [] ROM           [] Delirium                   [] Motor Control     OT PLAN OF CARE   OT POC based on physician orders, patient diagnosis and results of clinical assessment     Frequency/Duration  2-4 days/wk for 2 weeks PRN  Specific OT Treatment Interventions to include:   ADL retraining/adapted techniques and AE recommendations to increase functional independence within precautions                    Energy conservation techniques to improve tolerance for selfcare routine  Functional transfer/mobility training/DME recommendations for increased independence, safety and fall prevention         Patient/family education to increase safety and functional independence             Environmental modifications for safe mobility and completion of ADLs                             Therapeutic activity to improve functional performance during ADLs.                                          Therapeutic exercise to improve tolerance and functional strength for ADLs   Balance retraining/tolerance tasks for facilitation of postural control with dynamic challenges during ADLs . Positioning to improve functional independence  Cognitive retraining ex's to improve problem solving skills & safe participation in ADLs/IADLs           Recommended Adaptive Equipment: continue to assess      Home Living: Pt lives with , 1 story with ramp    Bathroom setup: tub/shower . Tub bench, elevated toilet seat    Equipment owned: walker , wheelchair, raised toilet seat      Prior Level of Function: recent assist as  with ADLs , assist  with IADLs; ambulated with walker         Pain Level: pain with all movement. R wrist red and swollen. Does not tolerate slight movement of pillow that UE resting on. Cognition: Awake. Answers questions with encouragement. Increased time to respond. Limited verbalization and soft spoken words. Functional Assessment:  AM-PAC Daily Activity Raw Score: 8/24    Initial Eval Status  Date: 8/4/22 Treatment Status  Date:8/10/22 STGs = LTGs  Time frame: 10-14 days   Feeding Min A Max A Supervision    Grooming Mod A,seated  Decrease standing balance/tolerance     Patient cued to lift her head up - neck flexed position -  reports this has been patient's position more recently  max A  SBA    UB Dressing Max A Dependent     SBA    LB Dressing Max A Dependent   Min A   Bathing Max A   Min A    Toileting Max A Dependent. Min A    Bed Mobility  Max A  Supine <>sit Max A x2 supine <>L sit   CGA   Functional Transfers Max A  Sit - stand from bed     Min A    Functional Mobility Attempted side steps to [facility]  Max A using walket  Patient fatigued , feeling alittle dizzy - returned to bed  Min A with good tolerance   Balance Sitting:    Static:  Min A- neck flexed     Dynamic:Max  A  Standing: Max A Poor sit balance. Marked posterior lean.        SBA - sitting  Min/CGA - standing    Activity Tolerance Poor with light activity  No SOB noted Poor   Fair + with ADL activity         UE ROM/strength UE ROM present throughout UES  Weakness throughout  unable to move L UE due to pain. Wrist red and swollen. Tolerate UE ROM therapeutic activity/exercises to increase strength and endurance for ADL/xfer activity      Comments:  Pt laying in the bed. Agreeable to try to sit to the side of the bed. L UE elevated on pillow. Unable to move digits/wrist.  Pillow left in place for support of UE during bed mobility. Poor tolerance for activity. Returned to bed after sitting upright briefly. Positioning completed. Education/treatment:  ADL retraining with facilitation of movement to increase self care skills. Therapeutic activity to address balance and endurance for ADL and transfers. Pt education of daily orientation and transfer safety. Pt has made decline of progress towards set goals.        Time In: 1:20  Time Out: 1:35     Min Units   Therapeutic Ex 25003     Therapeutic Activities 19718 15 1   ADL/Self Care 34337     Orthotic Management 01489     Neuro Re-Ed 94609     Non-Billable Time     TOTAL TIMED TREATMENT 15 300 [facility] MARY L[place]/L [id number]

## 2022-08-10 NOTE — PROGRESS NOTES
[facility] Quality Flow/Interdisciplinary Rounds Progress Note        Quality Flow Rounds held on August 10, 2022    Disciplines Attending:  Bedside Nurse, , , and Nursing Unit Leadership    Bryn Smith was admitted on 8/3/2022  8:35 PM    Anticipated Discharge Date:       Disposition:    Suman Score:  Suman Scale Score: 15    Readmission Risk              Risk of Unplanned Readmission:  0           Discussed patient goal for the day, patient clinical progression, and barriers to discharge. The following Goal(s) of the Day/Commitment(s) have been identified:  waiting on MRI Brain to determine plan.       Mahesh Almaguer RN  August 10, 2022

## 2022-08-10 NOTE — PROGRESS NOTES
Physical Therapy  Facility/Department: [facility]  Daily Treatment Note  NAME: Vega Marquez  : 1955  MRN: [id number]    Date of Service: 8/10/2022      Patient Diagnosis(es): The primary encounter diagnosis was TRACI (acute kidney injury) ([facility] [address] A diagnosis of Other fatigue was also pertinent to this visit. Evaluating Therapist: Rosalin Peabody PT     Room #:  8990/5653-X  Diagnosis:  TRACI (acute kidney injury) ([facility] [address] [N17.9]  Other fatigue [R53.83]  PMHx/PSHx:  CHF, CKD, hx of lung transplant, Epilepsy  Precautions:  falls, alarm        Social:  Pt lives with  in a 1 floor plan with ramped entrance. Ambulates with ww. Initial Evaluation  Date: 22 Treatment  8/10 Short Term/ Long Term   Goals   Was pt agreeable to Eval/treatment? yes yes     Does pt have pain? Neck pain Left wrist but during mobility, pt with pain everywhere     Bed Mobility Rolling: max assist  Supine to sit: max assist  Sit to supine: max assist  Scooting: max assist Rolling: NT  Supine to sit: Max A x 2  Sit to supine:  Dep A x 2    Scooting: Max A to sitting EOB    Min assist   Transfers Sit to stand: max assist  Stand to sit: max assist  Stand pivot: NT NT Min assist   Ambulation    NT NT 25 feet with ww with min assist   Stair Negotiation  Ascended and descended  NT    N/A   LE strength     B ankle dorsiflexion 0/5 all else 3-/5     3+/5   balance      poor       AM-PAC Raw score               10/24 8/24          Patient education  Pt educated on PT objectives during treatment session, posture while sitting EOB    Patient response to education:   Pt verbalized understanding Pt demonstrated skill Pt requires further education in this area       yes     ASSESSMENT:    Comments:  Pt found and left in bed with call light in reach and bed alarm on. Treatment:  Patient practiced and was instructed in the following treatment:   Functional mobility performed as documented above.   Pt with pain everywhere to light

## 2022-08-10 NOTE — PLAN OF CARE
Problem: Discharge Planning  Goal: Discharge to home or other facility with appropriate resources  8/10/2022 1212 by Laura Breen RN  Outcome: Progressing  8/10/2022 1210 by Laura Breen RN  Outcome: Progressing  8/10/2022 1210 by Laura Breen RN  Outcome: Progressing     Problem: Skin/Tissue Integrity  Goal: Absence of new skin breakdown  8/10/2022 1212 by Laura Breen RN  Outcome: Progressing  8/10/2022 1210 by Laura Breen RN  Outcome: Progressing  8/10/2022 1210 by Laura Breen RN  Outcome: Progressing     Problem: Safety - Adult  Goal: Free from fall injury  8/10/2022 1212 by Laura Breen RN  Outcome: Progressing  8/10/2022 1210 by Laura Breen RN  Outcome: Progressing  8/10/2022 1210 by Laura Breen RN  Outcome: Progressing  Flowsheets (Taken 8/10/2022 0800)  Free From Fall Injury: Instruct family/caregiver on patient safety  8/10/2022 0617 by Varsha Kim RN  Outcome: Progressing     Problem: Chronic Conditions and Co-morbidities  Goal: Patient's chronic conditions and co-morbidity symptoms are monitored and maintained or improved  8/10/2022 1212 by Laura Breen RN  Outcome: Progressing  8/10/2022 1210 by Laura Breen RN  Outcome: Progressing  8/10/2022 1210 by Laura Breen RN  Outcome: Progressing  8/10/2022 0617 by Varsha Kim RN  Outcome: Progressing     Problem: Nutrition Deficit:  Goal: Optimize nutritional status  8/10/2022 1212 by Laura Breen RN  Outcome: Progressing  8/10/2022 1210 by Laura Breen RN  Outcome: Progressing  8/10/2022 1210 by Laura Breen RN  Outcome: Progressing     Problem: Pain  Goal: Verbalizes/displays adequate comfort level or baseline comfort level  8/10/2022 1212 by Laura Breen RN  Outcome: Progressing  8/10/2022 1210 by Laura Breen RN  Outcome: Progressing  8/10/2022 1210 by Laura Breen RN  Outcome: Progressing  8/10/2022 0617 by Varsha Kim RN  Outcome: Progressing

## 2022-08-10 NOTE — PROGRESS NOTES
Called placed for Dr Frantz Tejeda regarding patients pain in her left wrist/forearm area. Xray negative for fracture. Patients wrist is slightly reddened, warm to the touch and very painful, edematous. Message left with answering service.

## 2022-08-11 ENCOUNTER — APPOINTMENT (OUTPATIENT)
Dept: ULTRASOUND IMAGING | Age: 67
DRG: 683 | End: 2022-08-11
Payer: COMMERCIAL

## 2022-08-11 ENCOUNTER — APPOINTMENT (OUTPATIENT)
Dept: GENERAL RADIOLOGY | Age: 67
DRG: 683 | End: 2022-08-11
Payer: COMMERCIAL

## 2022-08-11 LAB
ALBUMIN SERPL-MCNC: 2.6 G/DL (ref 3.5–5.2)
ALP BLD-CCNC: 76 U/L (ref 35–104)
ALT SERPL-CCNC: 13 U/L (ref 0–32)
ANION GAP SERPL CALCULATED.3IONS-SCNC: 12 MMOL/L (ref 7–16)
AST SERPL-CCNC: 40 U/L (ref 0–31)
BASOPHILS ABSOLUTE: 0.03 E9/L (ref 0–0.2)
BASOPHILS RELATIVE PERCENT: 0.3 % (ref 0–2)
BILIRUB SERPL-MCNC: 0.5 MG/DL (ref 0–1.2)
BUN BLDV-MCNC: 7 MG/DL (ref 6–23)
CALCIUM SERPL-MCNC: 7.7 MG/DL (ref 8.6–10.2)
CHLORIDE BLD-SCNC: 101 MMOL/L (ref 98–107)
CO2: 20 MMOL/L (ref 22–29)
CREAT SERPL-MCNC: 0.5 MG/DL (ref 0.5–1)
EOSINOPHILS ABSOLUTE: 0.11 E9/L (ref 0.05–0.5)
EOSINOPHILS RELATIVE PERCENT: 1.1 % (ref 0–6)
GFR AFRICAN AMERICAN: >60
GFR NON-AFRICAN AMERICAN: >60 ML/MIN/1.73
GLUCOSE BLD-MCNC: 122 MG/DL (ref 74–99)
HCT VFR BLD CALC: 25.8 % (ref 34–48)
HEMOGLOBIN: 8.6 G/DL (ref 11.5–15.5)
IMMATURE GRANULOCYTES #: 0.11 E9/L
IMMATURE GRANULOCYTES %: 1.1 % (ref 0–5)
LACTIC ACID: 1 MMOL/L (ref 0.5–2.2)
LYMPHOCYTES ABSOLUTE: 0.75 E9/L (ref 1.5–4)
LYMPHOCYTES RELATIVE PERCENT: 7.2 % (ref 20–42)
MCH RBC QN AUTO: 34 PG (ref 26–35)
MCHC RBC AUTO-ENTMCNC: 33.3 % (ref 32–34.5)
MCV RBC AUTO: 102 FL (ref 80–99.9)
METER GLUCOSE: 115 MG/DL (ref 74–99)
MONOCYTES ABSOLUTE: 0.77 E9/L (ref 0.1–0.95)
MONOCYTES RELATIVE PERCENT: 7.4 % (ref 2–12)
NEUTROPHILS ABSOLUTE: 8.64 E9/L (ref 1.8–7.3)
NEUTROPHILS RELATIVE PERCENT: 82.9 % (ref 43–80)
PDW BLD-RTO: 13.1 FL (ref 11.5–15)
PLATELET # BLD: 188 E9/L (ref 130–450)
PMV BLD AUTO: 9.8 FL (ref 7–12)
POTASSIUM SERPL-SCNC: 3.5 MMOL/L (ref 3.5–5)
PRO-BNP: ABNORMAL PG/ML (ref 0–125)
RBC # BLD: 2.53 E12/L (ref 3.5–5.5)
SODIUM BLD-SCNC: 133 MMOL/L (ref 132–146)
TOTAL PROTEIN: 5.7 G/DL (ref 6.4–8.3)
URIC ACID, SERUM: 3 MG/DL (ref 2.4–5.7)
WBC # BLD: 10.4 E9/L (ref 4.5–11.5)

## 2022-08-11 PROCEDURE — 85025 COMPLETE CBC W/AUTO DIFF WBC: CPT

## 2022-08-11 PROCEDURE — 36415 COLL VENOUS BLD VENIPUNCTURE: CPT

## 2022-08-11 PROCEDURE — 6370000000 HC RX 637 (ALT 250 FOR IP): Performed by: INTERNAL MEDICINE

## 2022-08-11 PROCEDURE — 96361 HYDRATE IV INFUSION ADD-ON: CPT

## 2022-08-11 PROCEDURE — 87040 BLOOD CULTURE FOR BACTERIA: CPT

## 2022-08-11 PROCEDURE — 2580000003 HC RX 258: Performed by: INTERNAL MEDICINE

## 2022-08-11 PROCEDURE — 6360000002 HC RX W HCPCS: Performed by: INTERNAL MEDICINE

## 2022-08-11 PROCEDURE — 96372 THER/PROPH/DIAG INJ SC/IM: CPT

## 2022-08-11 PROCEDURE — 82962 GLUCOSE BLOOD TEST: CPT

## 2022-08-11 PROCEDURE — 2700000000 HC OXYGEN THERAPY PER DAY

## 2022-08-11 PROCEDURE — 80053 COMPREHEN METABOLIC PANEL: CPT

## 2022-08-11 PROCEDURE — G0378 HOSPITAL OBSERVATION PER HR: HCPCS

## 2022-08-11 PROCEDURE — 2580000003 HC RX 258: Performed by: EMERGENCY MEDICINE

## 2022-08-11 PROCEDURE — 84550 ASSAY OF BLOOD/URIC ACID: CPT

## 2022-08-11 PROCEDURE — 87088 URINE BACTERIA CULTURE: CPT

## 2022-08-11 PROCEDURE — 83880 ASSAY OF NATRIURETIC PEPTIDE: CPT

## 2022-08-11 PROCEDURE — 71045 X-RAY EXAM CHEST 1 VIEW: CPT

## 2022-08-11 PROCEDURE — 93970 EXTREMITY STUDY: CPT

## 2022-08-11 PROCEDURE — 83605 ASSAY OF LACTIC ACID: CPT

## 2022-08-11 RX ORDER — COLCHICINE 0.6 MG/1
0.6 TABLET ORAL ONCE
Status: COMPLETED | OUTPATIENT
Start: 2022-08-11 | End: 2022-08-11

## 2022-08-11 RX ADMIN — CARVEDILOL 6.25 MG: 6.25 TABLET, FILM COATED ORAL at 18:07

## 2022-08-11 RX ADMIN — Medication 10 ML: at 08:46

## 2022-08-11 RX ADMIN — PRIMIDONE 150 MG: 50 TABLET ORAL at 12:39

## 2022-08-11 RX ADMIN — PRIMIDONE 150 MG: 50 TABLET ORAL at 22:08

## 2022-08-11 RX ADMIN — FOLIC ACID 1 MG: 1 TABLET ORAL at 08:46

## 2022-08-11 RX ADMIN — FAMOTIDINE 10 MG: 20 TABLET ORAL at 08:46

## 2022-08-11 RX ADMIN — LEVETIRACETAM 1500 MG: 100 SOLUTION ORAL at 18:07

## 2022-08-11 RX ADMIN — HEPARIN SODIUM 5000 UNITS: 10000 INJECTION INTRAVENOUS; SUBCUTANEOUS at 08:49

## 2022-08-11 RX ADMIN — Medication 5 ML: at 21:00

## 2022-08-11 RX ADMIN — COLCHICINE 0.6 MG: 0.6 TABLET ORAL at 12:39

## 2022-08-11 RX ADMIN — LACOSAMIDE 150 MG: 100 TABLET, FILM COATED ORAL at 05:33

## 2022-08-11 RX ADMIN — TACROLIMUS 3 MG: 1 CAPSULE ORAL at 08:46

## 2022-08-11 RX ADMIN — LACOSAMIDE 150 MG: 100 TABLET, FILM COATED ORAL at 12:39

## 2022-08-11 RX ADMIN — LEVETIRACETAM 1500 MG: 100 SOLUTION ORAL at 05:33

## 2022-08-11 RX ADMIN — AMLODIPINE BESYLATE 10 MG: 10 TABLET ORAL at 08:46

## 2022-08-11 RX ADMIN — PREDNISONE 5 MG: 5 TABLET ORAL at 08:46

## 2022-08-11 RX ADMIN — SODIUM CHLORIDE: 9 INJECTION, SOLUTION INTRAVENOUS at 17:59

## 2022-08-11 RX ADMIN — LACOSAMIDE 150 MG: 100 TABLET, FILM COATED ORAL at 22:08

## 2022-08-11 RX ADMIN — CARVEDILOL 6.25 MG: 6.25 TABLET, FILM COATED ORAL at 08:46

## 2022-08-11 RX ADMIN — HEPARIN SODIUM 5000 UNITS: 10000 INJECTION INTRAVENOUS; SUBCUTANEOUS at 22:09

## 2022-08-11 ASSESSMENT — PAIN SCALES - GENERAL
PAINLEVEL_OUTOF10: 10
PAINLEVEL_OUTOF10: 0

## 2022-08-11 ASSESSMENT — PAIN DESCRIPTION - DESCRIPTORS: DESCRIPTORS: DISCOMFORT;ACHING

## 2022-08-11 ASSESSMENT — PAIN DESCRIPTION - LOCATION: LOCATION: ARM

## 2022-08-11 ASSESSMENT — PAIN DESCRIPTION - ORIENTATION: ORIENTATION: RIGHT

## 2022-08-11 NOTE — PROGRESS NOTES
Admission  Energy (kcal/day):   Weight Used for Protein Requirements: Admission  Protein (g/day): 55-65 (1.2-1.4)  Method Used for Fluid Requirements: 1 ml/kcal  Fluid (ml/day):     Nutrition Diagnosis:   Moderate malnutrition, In context of chronic illness related to catabolic illness as evidenced by Criteria as identified in malnutrition assessment    Nutrition Interventions:   Food and/or Nutrient Delivery: Continue Current Diet, Continue Oral Nutrition Supplement (ADAT)  Nutrition Education/Counseling: No recommendation at this time  Coordination of Nutrition Care: Continue to monitor while inpatient       Goals:  Previous Goal Met: No Progress toward Goal(s)  Goals: PO intake 50% or greater, by next RD assessment       Nutrition Monitoring and Evaluation:      Food/Nutrient Intake Outcomes: Food and Nutrient Intake, Supplement Intake  Physical Signs/Symptoms Outcomes: Biochemical Data, GI Status, Fluid Status or Edema, Nutrition Focused Physical Findings, Skin, Weight    Discharge Planning:     Too soon to determine     Konrad Berry RD, LD  Contact: [phone]

## 2022-08-11 NOTE — SIGNIFICANT EVENT
RRT called about 1439 due to concern regarding patient with fever and swelling in upper extremities. Patient was seen and vitals were stable. She was awake and responsive to stimuli and pain. T 99.7 - 137/60; HR 94.  RR regular. O2 sat 95%. Monitor with SR.  Lungs were clear to A. Cor normal S1 S2. No rales or rhonchi. UE swollen bilaterally. Winces with movement. Blood sugar was 115. Work up for low grade temp. CMP, CBC, BNP, uric acid, blood cultures, UA and Urine culture. Repeat CXR. Bilateral upper extremity ultrasound. Spoke with  outside of the room. 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 She has normal renal function from a day ago. Will await results of labs and xrays obtained. Nursing contacting PCP - unable to speak to; left a message on voicemail. RUTHANN Harper MD  Hospitalist, [address]  ([id number]) : Labs within normal limit. BNP > 11K; no previous values for comparison CXR rotated but unchanged. US of bilateral UE not done yet.

## 2022-08-11 NOTE — PROGRESS NOTES
P Quality Flow/Interdisciplinary Rounds Progress Note        Quality Flow Rounds held on August 11, 2022    Disciplines Attending:  Bedside Nurse, , , and Nursing Unit Leadership    Melvern Duane was admitted on 8/3/2022  8:35 PM    Anticipated Discharge Date:       Disposition:    Suman Score:  Suman Scale Score: 15    Readmission Risk              Risk of Unplanned Readmission:  0           Discussed patient goal for the day, patient clinical progression, and barriers to discharge.   The following Goal(s) of the Day/Commitment(s) have been identified:  Check placement-      Carolyn Rosen RN  August 11, 2022

## 2022-08-11 NOTE — PROGRESS NOTES
Internal Medicine  Progress Note  Alan Tran MD     Subjective:     Patient is alert. Patient reports OK. Tolerating diet well no other c/o. Scheduled Meds:   amLODIPine  10 mg Oral QAM    carvedilol  6.25 mg Oral BID WC    famotidine  10 mg Oral Daily    levETIRAcetam  1,500 mg Oral BID    predniSONE  5 mg Oral QAM    primidone  150 mg Oral BID    tacrolimus  3 mg Oral Daily    lacosamide  150 mg Oral 3 times per day    folic acid  1 mg Oral Daily    sodium chloride flush  5-40 mL IntraVENous 2 times per day    heparin (porcine)  5,000 Units SubCUTAneous BID     Continuous Infusions:   sodium chloride      sodium chloride 100 mL/hr at 08/10/22 2041     PRN Meds:white petrolatum, acetaminophen, ondansetron, sodium chloride flush, sodium chloride flush, sodium chloride, [DISCONTINUED] ondansetron **OR** ondansetron    Objective:      Physical Exam:  Vitals:   /82   Pulse 87   Temp 98.3 °F (36.8 °C) (Oral)   Resp 20   Ht 5' (1.524 m)   Wt 115 lb 8 oz (52.4 kg)   SpO2 99%   BMI 22.56 kg/m²   Orthostatic BPs and Heart Rates:     I/O's    Intake/Output Summary (Last 24 hours) at 8/11/2022 0622  Last data filed at 8/10/2022 2200  Gross per 24 hour   Intake 60 ml   Output 900 ml   Net -840 ml     Exam:  General appearance: alert, appears stated age, and cooperative  Head: Normocephalic, without obvious abnormality, atraumatic  Eyes: conjunctivae/corneas clear. PERRL, EOM's intact. Fundi benign.   Throat: normal findings: lips normal without lesions  Neck: no adenopathy, no carotid bruit, no JVD, and supple, symmetrical, trachea midline  Back: negative  Lungs: clear to auscultation bilaterally  Chest wall: no tenderness  Heart: regular rate and rhythm  Abdomen: soft, non-tender; bowel sounds normal; no masses,  no organomegaly  Extremities: extremities normal, atraumatic, no cyanosis or edema  Pulses: 2+ and symmetric  Skin: Skin color, texture, turgor normal. No rashes or lesions  Lymph nodes: Cervical, supraclavicular, and axillary nodes normal.  Neurologic: Grossly normal      Imaging     Chest  Xray:  Results for orders placed during the hospital encounter of 19    XR CHEST STANDARD (2 VW)    Narrative  Patient MRN: [id number]  : 1955  Age:  60 years  Gender: Female  Order Date: 2019 9:00 AM  Exam: XR CHEST (2 VW)  Number of Images: 2 views  Indication:   cough  cough  Comparison: 2018    Findings: The heart is borderline  The lung fields demonstrate no significant pulmonary vascular  congestion and edema. The aorta is tortuous ectatic and calcified. There are infiltrates throughout the lung fields which are likely  chronic. There are infiltrates seen in the perihilar regions and at  the right lung base as well as the left lung base not convincingly  changed  Right trice is enlarged, this appears to be a chronic finding. Impression  Cardiomegaly  Findings compatible with atherosclerotic disease of the aorta. No acute infiltrate    Results for orders placed during the hospital encounter of 22    XR CHEST PORTABLE    Narrative  EXAMINATION:  ONE XRAY VIEW OF THE CHEST    8/3/2022 7:35 pm    COMPARISON:  2022    HISTORY:  ORDERING SYSTEM PROVIDED HISTORY: eval pneumonia  TECHNOLOGIST PROVIDED HISTORY:  Reason for exam:->eval pneumonia    FINDINGS:  The lungs are without acute focal process. There is no effusion or  pneumothorax. The cardiomediastinal silhouette is stable. The osseous  structures are stable. Impression  No acute process. Additional Imaging:  none    Lab Review   No results found for this or any previous visit (from the past 24 hour(s)).   Assessment:     Principal Problem:    TRACI (acute kidney injury) ([address]  Active Problems:    Immunosuppressed status (HCC)    Moderate protein-calorie malnutrition (HCC)    Folic acid deficiency    Megaloblastic anemia    Idiopathic pulmonary fibrosis (HCC)    Seizure disorder (HCC)    Osteoporosis    Acute on chronic kidney failure [facility])    Essential hypertension  Resolved Problems:    * No resolved hospital problems.  *      Plan:   Looks OK  MRI noted  Colchicine given for potential gout  Neuro wise she appears to be on the same continuim of neuro function that I have seen her on over the last 25 yrs or so  Will discharge to [facility] as the plan was earlier in week  Marco Olmstead MD  8/11/2022  6:22 AM

## 2022-08-11 NOTE — PROGRESS NOTES
Called RRT r/t patient status and need for doctor to see patient. VS WNL, however I was concerned about her breathing and swelling of her arms.

## 2022-08-11 NOTE — PROGRESS NOTES
Discharge ordered. Called physician in regards to patients left wrist/forearm. Checking to see if they want US of arm. Area is very warm to the touch and swollen.

## 2022-08-12 LAB
ADENOVIRUS BY PCR: NOT DETECTED
BORDETELLA PARAPERTUSSIS BY PCR: NOT DETECTED
BORDETELLA PERTUSSIS BY PCR: NOT DETECTED
C-REACTIVE PROTEIN: 23.2 MG/DL (ref 0–0.4)
CHLAMYDOPHILIA PNEUMONIAE BY PCR: NOT DETECTED
CORONAVIRUS 229E BY PCR: NOT DETECTED
CORONAVIRUS HKU1 BY PCR: NOT DETECTED
CORONAVIRUS NL63 BY PCR: NOT DETECTED
CORONAVIRUS OC43 BY PCR: NOT DETECTED
HUMAN METAPNEUMOVIRUS BY PCR: NOT DETECTED
HUMAN RHINOVIRUS/ENTEROVIRUS BY PCR: NOT DETECTED
INFLUENZA A BY PCR: NOT DETECTED
INFLUENZA B BY PCR: NOT DETECTED
MYCOPLASMA PNEUMONIAE BY PCR: NOT DETECTED
PARAINFLUENZA VIRUS 1 BY PCR: NOT DETECTED
PARAINFLUENZA VIRUS 2 BY PCR: NOT DETECTED
PARAINFLUENZA VIRUS 3 BY PCR: NOT DETECTED
PARAINFLUENZA VIRUS 4 BY PCR: NOT DETECTED
RESPIRATORY SYNCYTIAL VIRUS BY PCR: NOT DETECTED
SARS-COV-2, NAAT: NOT DETECTED
SARS-COV-2, PCR: NOT DETECTED
SEDIMENTATION RATE, ERYTHROCYTE: 98 MM/HR (ref 0–20)

## 2022-08-12 PROCEDURE — 86140 C-REACTIVE PROTEIN: CPT

## 2022-08-12 PROCEDURE — 2700000000 HC OXYGEN THERAPY PER DAY

## 2022-08-12 PROCEDURE — 36415 COLL VENOUS BLD VENIPUNCTURE: CPT

## 2022-08-12 PROCEDURE — 85651 RBC SED RATE NONAUTOMATED: CPT

## 2022-08-12 PROCEDURE — 6360000002 HC RX W HCPCS: Performed by: INTERNAL MEDICINE

## 2022-08-12 PROCEDURE — 6370000000 HC RX 637 (ALT 250 FOR IP): Performed by: SPECIALIST

## 2022-08-12 PROCEDURE — 6370000000 HC RX 637 (ALT 250 FOR IP): Performed by: INTERNAL MEDICINE

## 2022-08-12 PROCEDURE — 96372 THER/PROPH/DIAG INJ SC/IM: CPT

## 2022-08-12 PROCEDURE — 87635 SARS-COV-2 COVID-19 AMP PRB: CPT

## 2022-08-12 PROCEDURE — 2580000003 HC RX 258: Performed by: INTERNAL MEDICINE

## 2022-08-12 PROCEDURE — G0378 HOSPITAL OBSERVATION PER HR: HCPCS

## 2022-08-12 PROCEDURE — 0202U NFCT DS 22 TRGT SARS-COV-2: CPT

## 2022-08-12 PROCEDURE — 97110 THERAPEUTIC EXERCISES: CPT

## 2022-08-12 PROCEDURE — 1200000000 HC SEMI PRIVATE

## 2022-08-12 RX ORDER — COLCHICINE 0.6 MG/1
0.6 TABLET ORAL 3 TIMES DAILY
Status: COMPLETED | OUTPATIENT
Start: 2022-08-12 | End: 2022-08-14

## 2022-08-12 RX ADMIN — CARVEDILOL 6.25 MG: 6.25 TABLET, FILM COATED ORAL at 08:15

## 2022-08-12 RX ADMIN — PREDNISONE 5 MG: 5 TABLET ORAL at 08:14

## 2022-08-12 RX ADMIN — COLCHICINE 0.6 MG: 0.6 TABLET ORAL at 14:15

## 2022-08-12 RX ADMIN — LEVETIRACETAM 1500 MG: 100 SOLUTION ORAL at 06:30

## 2022-08-12 RX ADMIN — LACOSAMIDE 150 MG: 100 TABLET, FILM COATED ORAL at 06:30

## 2022-08-12 RX ADMIN — PRIMIDONE 150 MG: 50 TABLET ORAL at 08:14

## 2022-08-12 RX ADMIN — Medication 10 ML: at 21:00

## 2022-08-12 RX ADMIN — AMLODIPINE BESYLATE 10 MG: 10 TABLET ORAL at 08:15

## 2022-08-12 RX ADMIN — COLCHICINE 0.6 MG: 0.6 TABLET ORAL at 21:00

## 2022-08-12 RX ADMIN — LEVETIRACETAM 1500 MG: 100 SOLUTION ORAL at 17:30

## 2022-08-12 RX ADMIN — TACROLIMUS 3 MG: 1 CAPSULE ORAL at 08:15

## 2022-08-12 RX ADMIN — HEPARIN SODIUM 5000 UNITS: 10000 INJECTION INTRAVENOUS; SUBCUTANEOUS at 21:00

## 2022-08-12 RX ADMIN — Medication 10 ML: at 08:15

## 2022-08-12 RX ADMIN — HEPARIN SODIUM 5000 UNITS: 10000 INJECTION INTRAVENOUS; SUBCUTANEOUS at 08:15

## 2022-08-12 RX ADMIN — CARVEDILOL 6.25 MG: 6.25 TABLET, FILM COATED ORAL at 17:30

## 2022-08-12 RX ADMIN — LACOSAMIDE 150 MG: 100 TABLET, FILM COATED ORAL at 14:15

## 2022-08-12 RX ADMIN — LACOSAMIDE 150 MG: 100 TABLET, FILM COATED ORAL at 21:00

## 2022-08-12 RX ADMIN — FOLIC ACID 1 MG: 1 TABLET ORAL at 08:14

## 2022-08-12 RX ADMIN — FAMOTIDINE 10 MG: 20 TABLET ORAL at 08:15

## 2022-08-12 RX ADMIN — PRIMIDONE 150 MG: 50 TABLET ORAL at 21:00

## 2022-08-12 NOTE — CARE COORDINATION
Social work / Discharge Planning:        Patient accepted by [employer] and precert submitted. RRT yesterday. New ID consult. Covid negative result today. Electronically signed by SANDRA Stock on 8/12/2022 at 10:03 AM           Insurance denied [employer]. Peer to peer must be done by noon on Monday 8/15/22 by calling [phone] option 5. Reference number [id number]. RN updated and will inform physician.     Electronically signed by SANDRA Stock on 8/12/2022 at 2:27 PM

## 2022-08-12 NOTE — PROGRESS NOTES
Occupational Therapy  OT BEDSIDE TREATMENT NOTE      Date:2022  Patient Name: Gretel Webb  MRN: [id number]  : 1955  Room: [address] [place], [place] [address]     Evaluating OT: Dory Rain OTR/L   [id number]       Referring Corrinne Dice, MD    Specific Provider Orders/Date:OT eval and treat 2022       Diagnosis:  TRACI (acute kidney injury) ([facility] [address] [N17.9]  Other fatigue [R53.83]     Pertinent Medical History: hemiplegia, dialysis, CHF, falls, osteoporosis , back surgery, CVA, seizure     Precautions:  Fall Risk      Assessment of current deficits   [x] Functional mobility            [x]ADLs           [x] Strength                  [x]Cognition   [x] Functional transfers          [x] IADLs         [x] Safety Awareness   [x]Endurance   [] Fine Coordination                         [x] Balance      [] Vision/perception   []Sensation      []Gross Motor Coordination             [] ROM           [] Delirium                   [] Motor Control     OT PLAN OF CARE   OT POC based on physician orders, patient diagnosis and results of clinical assessment     Frequency/Duration  2-4 days/wk for 2 weeks PRN  Specific OT Treatment Interventions to include:   ADL retraining/adapted techniques and AE recommendations to increase functional independence within precautions                    Energy conservation techniques to improve tolerance for selfcare routine  Functional transfer/mobility training/DME recommendations for increased independence, safety and fall prevention         Patient/family education to increase safety and functional independence             Environmental modifications for safe mobility and completion of ADLs                             Therapeutic activity to improve functional performance during ADLs.                                          Therapeutic exercise to improve tolerance and functional strength for ADLs   Balance retraining/tolerance tasks for facilitation of postural control with dynamic challenges activity/exercises to increase strength and endurance for ADL/xfer activity      Comments:  Pt laying in the bed. Pt unable to tolerate any activity at this time. Yells out in pain with slight gentle touch. Cries out with attempts of slightest movement of UE's.  present and sitting on the couch. Explained to  that any intervention is causing pt pain and distress. Nursing notified. Education/treatment:  ADL retraining with facilitation of movement to increase self care skills. Therapeutic activity to address balance and endurance for ADL and transfers. Pt education of daily orientation. Pt has made decline of progress towards set goals.        Time In: 9:53  Time Out: 10:03      Min Units   Therapeutic Ex 85585 7 1   Therapeutic Activities 11999 3    ADL/Self Care 07229     Orthotic Management 43219     Neuro Re-Ed 01248     Non-Billable Time     TOTAL TIMED TREATMENT 10 300 [facility] MARY LOU/ [address]

## 2022-08-12 NOTE — CONSULTS
[address] [place] [address] [facility]  Consultation Note     Admit Date: 8/3/2022  8:35 PM    Reason for Consult:   Fever in immunocompromised patient    Attending Physician:  Cleo Watson MD    HISTORY OF PRESENT ILLNESS:             The history is obtained from extensive review of available past medical records. The patient is a 74 y.o. female who is previously known to the ID service. Patient has a history of idiopathic pulmonary fibrosis and CHF. She has had a right lung transplant. She presents with fatigue and was admitted to [facility] on 8/3/2022 with acute kidney injury. She was treated with 2 doses of colchicine for possible gout. At 1 point she became lethargic and had MRI of the brain was unremarkable an RRT was called on 8/11/2022 because of fever and swelling of upper extremities. She has had a T-max of 101.6 degrees. Blood cultures and urine cultures were ordered. Rapid SARS-CoV-2 was negative. She is complaining of pain on both wrists as well as swelling in the right upper extremity where she has an AV fistula. Denies dyspnea. Chest x-ray was unremarkable.   White count is normal.    Past Medical History:        Diagnosis Date    Acute on chronic respiratory failure with hypoxia ([address] 4/7/2017    Anemia     Cardiomegaly     CHF (congestive heart failure) (HCC)     Chronic kidney disease     Constipation     Diaphragmatic hernia     Diverticulitis     Epilepsy ([address]     Falls     GERD (gastroesophageal reflux disease)     Hemiparesis (HCC)     Hemiplegia (HCC)     Hemodialysis patient ([address]     MON - WED- FRI    History of blood transfusion     History of lung transplant ([address]     Hyperparathyroidism ([address]     Hypertension     Immunosuppressed status ([address] 4/7/2017    Insomnia     IPF (idiopathic pulmonary fibrosis) (HCC)     Kidney stone     Neutropenia (HCC)     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 Admitted to [facility] with a nonproductive cough and rhinorrhea. She was treated with Vancomycin and Cefepime. She tested positive for human metapneumovirus. Seen by ID for pneumonia. She did not require antibiotics and was discharged the next day.     Past Surgical History:        Procedure Laterality Date    AV FISTULA REPAIR Right 08/17/2017    BACK SURGERY      CHOLECYSTECTOMY      COLONOSCOPY      DIALYSIS FISTULA CREATION Right 04/27/2017    right arm AV fistula/Dr. LAN    FIXATION KYPHOPLASTY  11/3/2015    kyphoplasty L5    FRACTURE SURGERY Left 12/2014    fracture    HERNIA REPAIR      HIP SURGERY      left    JOINT REPLACEMENT Bilateral     HIP    JOINT REPLACEMENT Left     knee    KNEE SURGERY      left    LEG SURGERY Left 12/06/2016    ORIF left femoral shaft and suprachondylr femur    LUNG TRANSPLANT  2003    [facility]    OTHER SURGICAL HISTORY  11/14/2017    RUE fistula revision    NY ANASTOMOSIS,AV,ANY SITE Right 9/28/2018    AV FISTULA  REVISION -- RIGHT ARM performed by Rene Mcmahon MD at [address] [place] [address]     Current Medications:   Scheduled Meds:   amLODIPine  10 mg Oral QAM    carvedilol  6.25 mg Oral BID WC    famotidine  10 mg Oral Daily    levETIRAcetam  1,500 mg Oral BID    predniSONE  5 mg Oral QAM    primidone  150 mg Oral BID    tacrolimus  3 mg Oral Daily    lacosamide  150 mg Oral 3 times per day    folic acid  1 mg Oral Daily    sodium chloride flush  5-40 mL IntraVENous 2 times per day    heparin (porcine)  5,000 Units SubCUTAneous BID     Continuous Infusions:   sodium chloride      sodium chloride Stopped (08/11/22 1803)     PRN Meds:white petrolatum, acetaminophen, ondansetron, sodium chloride flush, sodium chloride flush, sodium chloride, [DISCONTINUED] ondansetron **OR** ondansetron    Allergies:  Ambien [zolpidem tartrate], Klonopin [clonazepam], Lorazepam, and Eszopiclone    Social History: Social History     Socioeconomic History    Marital status:    Tobacco Use    Smoking status: Never    Smokeless tobacco: Never   Substance and Sexual Activity    Alcohol use: No    Drug use: No    Sexual activity: Not Currently     Partners: Male      Pets: Dog  Travel: No  The patient lives at home with her     Family History:       Family history unknown: Yes   . Otherwise non-pertinent to the chief complaint. REVIEW OF SYSTEMS:    Constitutional: Fever. Lethargy. Fatigue. Neurologic: Negative   Psychiatric: Negative  Rheumatologic: Negative   Endocrine: Negative  Hematologic: Negative  Immunologic: Negative  ENT: Negative  Respiratory: No dyspnea or cough  Cardiovascular: Negative  GI: Negative  : Negative  Musculoskeletal: As in the HPI  Skin: No rashes. PHYSICAL EXAM:    Vitals:   BP (!) 162/87   Pulse 86   Temp 99.2 °F (37.3 °C) (Oral)   Resp 18   Ht 5' (1.524 m)   Wt 113 lb 9.6 oz (51.5 kg)   SpO2 95%   BMI 22.19 kg/m²   Constitutional: The patient is lying in bed. She is still lethargic but arousable. She answers questions and follows commands. Appears to be fatigued. Skin: Warm and dry. No rashes were noted. HEENT: Eyes show round, and reactive pupils. No jaundice. Moist mucous membranes, no ulcerations, no thrush. Neck: Supple to movements. No lymphadenopathy. Chest: No use of accessory muscles to breathe. Symmetrical expansion. Auscultation reveals no wheezing, crackles, or rhonchi. Cardiovascular: S1 and S2 are rhythmic and regular. No murmurs appreciated. Abdomen: Positive bowel sounds to auscultation. Slightly distended and tympanic. Benign to palpation. No masses felt. No hepatosplenomegaly. Extremities: Moderate edema right upper extremity. Right AV fistula. Both wrists are tender and swollen. Also tender elbows. Lines: Peripheral.  Pure wick.     CBC+dif:  Recent Labs     08/11/22  1503   WBC 10.4   HGB 8.6*   HCT 25.8*   .0*    NEUTROABS 8.64*     No results found for: CRP   No results found for: CRPHS  No results found for: SEDRATE  Lab Results   Component Value Date    ALT 13 08/11/2022    AST 40 (H) 08/11/2022    ALKPHOS 76 08/11/2022    BILITOT 0.5 08/11/2022     Lab Results   Component Value Date/Time     08/11/2022 03:03 PM    K 3.5 08/11/2022 03:03 PM    K 4.3 07/31/2022 10:41 AM     08/11/2022 03:03 PM    CO2 20 08/11/2022 03:03 PM    BUN 7 08/11/2022 03:03 PM    CREATININE 0.5 08/11/2022 03:03 PM    GFRAA >60 08/11/2022 03:03 PM    LABGLOM >60 08/11/2022 03:03 PM    GLUCOSE 122 08/11/2022 03:03 PM    GLUCOSE 85 03/25/2012 06:25 AM    PROT 5.7 08/11/2022 03:03 PM    LABALBU 2.6 08/11/2022 03:03 PM    CALCIUM 7.7 08/11/2022 03:03 PM    BILITOT 0.5 08/11/2022 03:03 PM    ALKPHOS 76 08/11/2022 03:03 PM    AST 40 08/11/2022 03:03 PM    ALT 13 08/11/2022 03:03 PM       Lab Results   Component Value Date/Time    PROTIME 11.1 10/03/2019 06:25 AM    INR 1.0 10/03/2019 06:25 AM       Lab Results   Component Value Date/Time    TSH 0.685 08/05/2022 03:45 AM       Lab Results   Component Value Date/Time    COLORU Yellow 08/04/2022 07:20 PM    PHUR 6.0 08/04/2022 07:20 PM    WBCUA 2-5 08/04/2022 07:20 PM    RBCUA 1-3 08/04/2022 07:20 PM    RBCUA NONE 10/29/2013 08:25 PM    MUCUS Present 07/20/2022 09:19 PM    BACTERIA MODERATE 08/04/2022 07:20 PM    CLARITYU Clear 08/04/2022 07:20 PM    SPECGRAV 1.020 08/04/2022 07:20 PM    LEUKOCYTESUR Negative 08/04/2022 07:20 PM    UROBILINOGEN 0.2 08/04/2022 07:20 PM    BILIRUBINUR Negative 08/04/2022 07:20 PM    BLOODU MODERATE 08/04/2022 07:20 PM    GLUCOSEU Negative 08/04/2022 07:20 PM    AMORPHOUS MODERATE 08/03/2022 10:09 PM       Lab Results   Component Value Date/Time    HCO3 12.4 12/07/2016 06:30 PM    BE -13.8 12/07/2016 06:30 PM    O2SAT 97.6 12/07/2016 06:30 PM    PH 7.236 12/07/2016 06:30 PM    PCO2 29.8 12/07/2016 06:30 PM    PO2 96.3 12/07/2016 06:30 PM

## 2022-08-12 NOTE — PROGRESS NOTES
Transfer order placed-patient was ADMITTED AS MED SURG /TELE SERVICE.     Electronically signed by Virl Osgood, RN on 8/12/2022 at 4:38 PM

## 2022-08-12 NOTE — PROGRESS NOTES
normal. No rashes or lesions  Lymph nodes: Cervical, supraclavicular, and axillary nodes normal.  Neurologic: Grossly normal      Imaging     Chest  Xray:  Results for orders placed during the hospital encounter of 19    XR CHEST STANDARD (2 VW)    Narrative  Patient MRN: [id number]  : 1955  Age:  60 years  Gender: Female  Order Date: 2019 9:00 AM  Exam: XR CHEST (2 VW)  Number of Images: 2 views  Indication:   cough  cough  Comparison: 2018    Findings: The heart is borderline  The lung fields demonstrate no significant pulmonary vascular  congestion and edema. The aorta is tortuous ectatic and calcified. There are infiltrates throughout the lung fields which are likely  chronic. There are infiltrates seen in the perihilar regions and at  the right lung base as well as the left lung base not convincingly  changed  Right trice is enlarged, this appears to be a chronic finding. Impression  Cardiomegaly  Findings compatible with atherosclerotic disease of the aorta. No acute infiltrate    Results for orders placed during the hospital encounter of 22    XR CHEST PORTABLE    Narrative  EXAMINATION:  ONE XRAY VIEW OF THE CHEST    2022 3:59 pm    COMPARISON:  2022    HISTORY:  ORDERING SYSTEM PROVIDED HISTORY: increased RR  TECHNOLOGIST PROVIDED HISTORY:  Reason for exam:->increased RR    FINDINGS:  The lungs are without acute focal process. There is no effusion or  pneumothorax. Cardiomegaly. The osseous structures are without acute  process. Impression  No acute process.       Additional Imaging:  none    Lab Review   Recent Results (from the past 24 hour(s))   POCT Glucose    Collection Time: 22  2:41 PM   Result Value Ref Range    Meter Glucose 115 (H) 74 - 99 mg/dL   Lactic Acid    Collection Time: 22  3:00 PM   Result Value Ref Range    Lactic Acid 1.0 0.5 - 2.2 mmol/L   Comprehensive Metabolic Panel    Collection Time: 22  3:03 PM   Result Value Ref Range    Sodium 133 132 - 146 mmol/L    Potassium 3.5 3.5 - 5.0 mmol/L    Chloride 101 98 - 107 mmol/L    CO2 20 (L) 22 - 29 mmol/L    Anion Gap 12 7 - 16 mmol/L    Glucose 122 (H) 74 - 99 mg/dL    BUN 7 6 - 23 mg/dL    Creatinine 0.5 0.5 - 1.0 mg/dL    GFR Non-African American >60 >=60 mL/min/1.73    GFR African American >60     Calcium 7.7 (L) 8.6 - 10.2 mg/dL    Total Protein 5.7 (L) 6.4 - 8.3 g/dL    Albumin 2.6 (L) 3.5 - 5.2 g/dL    Total Bilirubin 0.5 0.0 - 1.2 mg/dL    Alkaline Phosphatase 76 35 - 104 U/L    ALT 13 0 - 32 U/L    AST 40 (H) 0 - 31 U/L   CBC with Auto Differential    Collection Time: 08/11/22  3:03 PM   Result Value Ref Range    WBC 10.4 4.5 - 11.5 E9/L    RBC 2.53 (L) 3.50 - 5.50 E12/L    Hemoglobin 8.6 (L) 11.5 - 15.5 g/dL    Hematocrit 25.8 (L) 34.0 - 48.0 %    .0 (H) 80.0 - 99.9 fL    MCH 34.0 26.0 - 35.0 pg    MCHC 33.3 32.0 - 34.5 %    RDW 13.1 11.5 - 15.0 fL    Platelets 385 009 - 042 E9/L    MPV 9.8 7.0 - 12.0 fL    Neutrophils % 82.9 (H) 43.0 - 80.0 %    Immature Granulocytes % 1.1 0.0 - 5.0 %    Lymphocytes % 7.2 (L) 20.0 - 42.0 %    Monocytes % 7.4 2.0 - 12.0 %    Eosinophils % 1.1 0.0 - 6.0 %    Basophils % 0.3 0.0 - 2.0 %    Neutrophils Absolute 8.64 (H) 1.80 - 7.30 E9/L    Immature Granulocytes # 0.11 E9/L    Lymphocytes Absolute 0.75 (L) 1.50 - 4.00 E9/L    Monocytes Absolute 0.77 0.10 - 0.95 E9/L    Eosinophils Absolute 0.11 0.05 - 0.50 E9/L    Basophils Absolute 0.03 0.00 - 0.20 E9/L   Brain Natriuretic Peptide    Collection Time: 08/11/22  3:03 PM   Result Value Ref Range    Pro-BNP 11,362 (H) 0 - 125 pg/mL   Uric Acid    Collection Time: 08/11/22  3:03 PM   Result Value Ref Range    Uric Acid, Serum 3.0 2.4 - 5.7 mg/dL     Assessment:     Principal Problem:    TRACI (acute kidney injury) (HCC)  Active Problems:    Immunosuppressed status (HCC)    Moderate protein-calorie malnutrition (HCC)    Folic acid deficiency    Megaloblastic anemia    Idiopathic pulmonary fibrosis ([facility]    Seizure disorder ([facility]    Osteoporosis    Acute on chronic kidney failure ([facility]    Essential hypertension  Resolved Problems:    * No resolved hospital problems.  *      Plan:   RRT noted  Hospitalist ATTN appreciated  In light of temp with immune compromised status will ask ID to see as well as check Lico Hameed MD  8/12/2022  6:10 AM

## 2022-08-12 NOTE — PROGRESS NOTES
SW spoke to , wants to try [facility] ARU, patient was there before and had a positive experience. [place] requested a peer to peer. Dr. Amanda Mata or Dr. Aminata Decker please call [phone]  Option #5  Ref# [id number]  Must be called by noon Monday 8/15/22. Call placed to Dr. Anna Kumar office, left secure voicemail with above information.     Electronically signed by Elio Buchanan RN on 8/12/2022 at 3:26 PM

## 2022-08-13 LAB — URINE CULTURE, ROUTINE: NORMAL

## 2022-08-13 PROCEDURE — 1200000000 HC SEMI PRIVATE

## 2022-08-13 PROCEDURE — 2700000000 HC OXYGEN THERAPY PER DAY

## 2022-08-13 PROCEDURE — 6360000002 HC RX W HCPCS: Performed by: INTERNAL MEDICINE

## 2022-08-13 PROCEDURE — 6370000000 HC RX 637 (ALT 250 FOR IP): Performed by: INTERNAL MEDICINE

## 2022-08-13 PROCEDURE — 6370000000 HC RX 637 (ALT 250 FOR IP): Performed by: SPECIALIST

## 2022-08-13 PROCEDURE — 2580000003 HC RX 258: Performed by: INTERNAL MEDICINE

## 2022-08-13 RX ORDER — METHYLPREDNISOLONE SODIUM SUCCINATE 40 MG/ML
40 INJECTION, POWDER, LYOPHILIZED, FOR SOLUTION INTRAMUSCULAR; INTRAVENOUS EVERY 12 HOURS
Status: DISCONTINUED | OUTPATIENT
Start: 2022-08-13 | End: 2022-08-15

## 2022-08-13 RX ADMIN — CARVEDILOL 6.25 MG: 6.25 TABLET, FILM COATED ORAL at 17:30

## 2022-08-13 RX ADMIN — AMLODIPINE BESYLATE 10 MG: 10 TABLET ORAL at 08:52

## 2022-08-13 RX ADMIN — COLCHICINE 0.6 MG: 0.6 TABLET ORAL at 14:44

## 2022-08-13 RX ADMIN — HEPARIN SODIUM 5000 UNITS: 10000 INJECTION INTRAVENOUS; SUBCUTANEOUS at 08:52

## 2022-08-13 RX ADMIN — PRIMIDONE 150 MG: 50 TABLET ORAL at 20:22

## 2022-08-13 RX ADMIN — COLCHICINE 0.6 MG: 0.6 TABLET ORAL at 20:23

## 2022-08-13 RX ADMIN — Medication 10 ML: at 20:25

## 2022-08-13 RX ADMIN — METHYLPREDNISOLONE SODIUM SUCCINATE 40 MG: 40 INJECTION, POWDER, LYOPHILIZED, FOR SOLUTION INTRAMUSCULAR; INTRAVENOUS at 20:21

## 2022-08-13 RX ADMIN — LACOSAMIDE 150 MG: 100 TABLET, FILM COATED ORAL at 20:22

## 2022-08-13 RX ADMIN — PRIMIDONE 150 MG: 50 TABLET ORAL at 08:51

## 2022-08-13 RX ADMIN — CARVEDILOL 6.25 MG: 6.25 TABLET, FILM COATED ORAL at 08:52

## 2022-08-13 RX ADMIN — HEPARIN SODIUM 5000 UNITS: 10000 INJECTION INTRAVENOUS; SUBCUTANEOUS at 20:21

## 2022-08-13 RX ADMIN — COLCHICINE 0.6 MG: 0.6 TABLET ORAL at 08:51

## 2022-08-13 RX ADMIN — LACOSAMIDE 150 MG: 100 TABLET, FILM COATED ORAL at 06:08

## 2022-08-13 RX ADMIN — LEVETIRACETAM 1500 MG: 100 SOLUTION ORAL at 06:08

## 2022-08-13 RX ADMIN — FOLIC ACID 1 MG: 1 TABLET ORAL at 08:51

## 2022-08-13 RX ADMIN — Medication 10 ML: at 08:54

## 2022-08-13 RX ADMIN — FAMOTIDINE 10 MG: 20 TABLET ORAL at 08:51

## 2022-08-13 RX ADMIN — TACROLIMUS 3 MG: 1 CAPSULE ORAL at 08:51

## 2022-08-13 RX ADMIN — LACOSAMIDE 150 MG: 100 TABLET, FILM COATED ORAL at 14:44

## 2022-08-13 RX ADMIN — METHYLPREDNISOLONE SODIUM SUCCINATE 40 MG: 40 INJECTION, POWDER, LYOPHILIZED, FOR SOLUTION INTRAMUSCULAR; INTRAVENOUS at 08:52

## 2022-08-13 RX ADMIN — LEVETIRACETAM 1500 MG: 100 SOLUTION ORAL at 17:30

## 2022-08-13 ASSESSMENT — PAIN SCALES - GENERAL: PAINLEVEL_OUTOF10: 5

## 2022-08-13 NOTE — PROGRESS NOTES
[address] [place] [address] [facility]  Progress Note    SUBJECTIVE:  Chief Complaint   Patient presents with    Fatigue     Patient is tolerating medications. No reported adverse drug reactions. No nausea, vomiting, diarrhea. Family present. Patient weak     Review of systems:  As stated above in the chief complaint, otherwise negative. Medications:  Scheduled Meds:   methylPREDNISolone  40 mg IntraVENous Q12H    colchicine  0.6 mg Oral TID    amLODIPine  10 mg Oral QAM    carvedilol  6.25 mg Oral BID WC    famotidine  10 mg Oral Daily    levETIRAcetam  1,500 mg Oral BID    [Held by provider] predniSONE  5 mg Oral QAM    primidone  150 mg Oral BID    tacrolimus  3 mg Oral Daily    lacosamide  150 mg Oral 3 times per day    folic acid  1 mg Oral Daily    sodium chloride flush  5-40 mL IntraVENous 2 times per day    heparin (porcine)  5,000 Units SubCUTAneous BID     Continuous Infusions:   sodium chloride      sodium chloride Stopped (22 1803)     PRN Meds:white petrolatum, acetaminophen, ondansetron, sodium chloride flush, sodium chloride flush, sodium chloride, [DISCONTINUED] ondansetron **OR** ondansetron    OBJECTIVE:  /63   Pulse 79   Temp 99.2 °F (37.3 °C) (Oral)   Resp 18   Ht 5' (1.524 m)   Wt 111 lb 12.8 oz (50.7 kg)   SpO2 96%   BMI 21.83 kg/m²   Temp  Av.9 °F (37.2 °C)  Min: 98.4 °F (36.9 °C)  Max: 99.2 °F (37.3 °C)  Constitutional: The patient is awake, alert, and oriented. Fatigue. Skin: Warm and dry. No rashes were noted. HEENT: Round and reactive pupils. Moist mucous membranes. No ulcerations or thrush. Neck: Supple to movements. Chest: No use of accessory muscles to breathe. Symmetrical expansion. No wheezing, crackles or rhonchi. He is with cannula oxygen  Cardiovascular: S1 and S2 are rhythmic and regular. No murmurs appreciated. Abdomen: Positive bowel sounds to auscultation. Benign to palpation. No masses felt. No hepatosplenomegaly. Pubic. Extremities: No clubbing, no cyanosis, no edema. Bilateral wrist tender with swelling right versus the left. Poor range of motion. Right AV fistula. Lines: peripheral    Laboratory and Tests Review:  Lab Results   Component Value Date    WBC 10.4 08/11/2022    WBC 7.2 08/08/2022    WBC 5.9 08/07/2022    HGB 8.6 (L) 08/11/2022    HCT 25.8 (L) 08/11/2022    .0 (H) 08/11/2022     08/11/2022     Lab Results   Component Value Date    NEUTROABS 8.64 (H) 08/11/2022    NEUTROABS 3.40 08/03/2022    NEUTROABS 6.01 07/31/2022     No results found for: CRPHS  Lab Results   Component Value Date    ALT 13 08/11/2022    AST 40 (H) 08/11/2022    ALKPHOS 76 08/11/2022    BILITOT 0.5 08/11/2022     Lab Results   Component Value Date/Time     08/11/2022 03:03 PM    K 3.5 08/11/2022 03:03 PM    K 4.3 07/31/2022 10:41 AM     08/11/2022 03:03 PM    CO2 20 08/11/2022 03:03 PM    BUN 7 08/11/2022 03:03 PM    CREATININE 0.5 08/11/2022 03:03 PM    CREATININE 0.7 08/08/2022 04:36 AM    CREATININE 0.7 08/07/2022 04:15 AM    GFRAA >60 08/11/2022 03:03 PM    LABGLOM >60 08/11/2022 03:03 PM    GLUCOSE 122 08/11/2022 03:03 PM    GLUCOSE 85 03/25/2012 06:25 AM    PROT 5.7 08/11/2022 03:03 PM    LABALBU 2.6 08/11/2022 03:03 PM    CALCIUM 7.7 08/11/2022 03:03 PM    BILITOT 0.5 08/11/2022 03:03 PM    ALKPHOS 76 08/11/2022 03:03 PM    AST 40 08/11/2022 03:03 PM    ALT 13 08/11/2022 03:03 PM     Lab Results   Component Value Date    CRP 23.2 (H) 08/12/2022     Lab Results   Component Value Date    SEDRATE 98 (H) 08/12/2022     Radiology:      Microbiology:   Rapid SARS-CoV-2 8/2/2022: Negative  Urine culture 8/4/2022: Negative  Blood cultures 8/3/2022: Negative x2  Blood cultures 8/11/2022: Negative so far  Rapid SARS-CoV-2 8/12/2022: Negative  Respiratory panel: Negative    Assessment:  Gouty arthritis-bilateral wrists-right versus the left  Fever probably secondary to her gouty arthritis.   This seems to have improved after starting colchicine  Status post right lung transplant  Immunocompromised host  ESRD    Plan:    Continue stress doses of steroids  Colchicine 3 times daily. Change this to twice daily by tomorrow  Check cultures, baseline ESR, CRP  Respiratory panel  Will follow with you    ESTRELLA Samuel - CNS  9:27 AM  8/13/2022     Patient seen and examined. I had a face to face encounter with the patient. Agree with exam.  Assessment and plan as outlined above and directed by me. Addition and corrections were done as deemed appropriate. My exam and plan include: Patient is tolerating colchicine. She is now on steroids. Although the wrists are still hurting, they are/swollen and they were yesterday. She has not had any fevers in 24 hours. Continue current treatment. Cultures are negative. Antibiotics are not warranted.     Adelia Moore MD  8/13/2022  12:15 PM

## 2022-08-13 NOTE — PROGRESS NOTES
Subjective:  Joint/wrist feels better. The patient is awake and alert. No problems overnight. Denies chest pain, angina, and dyspnea. Denies abdominal pain. Tolerating diet   ie improving  No nausea or vomiting. Objective:     /77   Pulse 84   Temp 98.4 °F (36.9 °C) (Oral)   Resp 20   Ht 5' (1.524 m)   Wt 111 lb 12.8 oz (50.7 kg)   SpO2 98%   BMI 21.83 kg/m²     Heart:  RRR, no murmurs, gallops, or rubs.   Lungs:  CTA bilaterally, no wheeze,  \"dry\" basialr rales , no rhonchi  Abd: bowel sounds present, nontender, nondistended, no masses  Extrem:  No clubbing, cyanosis, or edema  Oral mucosa nl    CBC:   Lab Results   Component Value Date/Time    WBC 10.4 08/11/2022 03:03 PM    RBC 2.53 08/11/2022 03:03 PM    HGB 8.6 08/11/2022 03:03 PM    HCT 25.8 08/11/2022 03:03 PM    .0 08/11/2022 03:03 PM    MCH 34.0 08/11/2022 03:03 PM    MCHC 33.3 08/11/2022 03:03 PM    RDW 13.1 08/11/2022 03:03 PM     08/11/2022 03:03 PM    MPV 9.8 08/11/2022 03:03 PM     BMP:    Lab Results   Component Value Date/Time     08/11/2022 03:03 PM    K 3.5 08/11/2022 03:03 PM    K 4.3 07/31/2022 10:41 AM     08/11/2022 03:03 PM    CO2 20 08/11/2022 03:03 PM    BUN 7 08/11/2022 03:03 PM    LABALBU 2.6 08/11/2022 03:03 PM    CREATININE 0.5 08/11/2022 03:03 PM    CALCIUM 7.7 08/11/2022 03:03 PM    GFRAA >60 08/11/2022 03:03 PM    LABGLOM >60 08/11/2022 03:03 PM    GLUCOSE 122 08/11/2022 03:03 PM    GLUCOSE 85 03/25/2012 06:25 AM        Assessment:    Patient Active Problem List   Diagnosis    Megaloblastic anemia    Idiopathic pulmonary fibrosis ([facility]    Seizure disorder ([facility]    Osteoporosis    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[facility]    Acute on chronic kidney failure ([facility]    Encounter regarding vascular access for dialysis for ESRD ([facility]    Volume overload    Essential hypertension    Shortness of breath    Immunosuppressed status ([facility]    Acute on chronic respiratory failure with hypoxia ([facility])    Acute on chronic diastolic congestive heart failure ([facility])    Generalized seizure disorder ([facility])    Iron deficiency anemia    ESRD (end stage renal disease) on dialysis ([facility]    AVF (arteriovenous fistula) ([facility])    Hospital-acquired pneumonia    Pneumonia    Anemia    Inferior pubic ramus fracture, left, closed, initial encounter ([facility])    Nausea & vomiting    Tear of medial collateral ligament of left knee    TRACI (acute kidney injury) ([facility]    Moderate protein-calorie malnutrition ([facility])    Folic acid deficiency       Plan:  Brief Solumedrol course to quiet down joints.           Va Jordan MD  7:01 AM  8/13/2022

## 2022-08-14 LAB
ANION GAP SERPL CALCULATED.3IONS-SCNC: 11 MMOL/L (ref 7–16)
BUN BLDV-MCNC: 13 MG/DL (ref 6–23)
CALCIUM SERPL-MCNC: 8.3 MG/DL (ref 8.6–10.2)
CHLORIDE BLD-SCNC: 101 MMOL/L (ref 98–107)
CO2: 23 MMOL/L (ref 22–29)
CREAT SERPL-MCNC: 0.6 MG/DL (ref 0.5–1)
GFR AFRICAN AMERICAN: >60
GFR NON-AFRICAN AMERICAN: >60 ML/MIN/1.73
GLUCOSE BLD-MCNC: 120 MG/DL (ref 74–99)
HCT VFR BLD CALC: 25.2 % (ref 34–48)
HEMOGLOBIN: 8.2 G/DL (ref 11.5–15.5)
MCH RBC QN AUTO: 33.1 PG (ref 26–35)
MCHC RBC AUTO-ENTMCNC: 32.5 % (ref 32–34.5)
MCV RBC AUTO: 101.6 FL (ref 80–99.9)
PDW BLD-RTO: 12.5 FL (ref 11.5–15)
PLATELET # BLD: 212 E9/L (ref 130–450)
PMV BLD AUTO: 10.3 FL (ref 7–12)
POTASSIUM SERPL-SCNC: 3.7 MMOL/L (ref 3.5–5)
RBC # BLD: 2.48 E12/L (ref 3.5–5.5)
SODIUM BLD-SCNC: 135 MMOL/L (ref 132–146)
WBC # BLD: 5.3 E9/L (ref 4.5–11.5)

## 2022-08-14 PROCEDURE — 6370000000 HC RX 637 (ALT 250 FOR IP): Performed by: INTERNAL MEDICINE

## 2022-08-14 PROCEDURE — 6370000000 HC RX 637 (ALT 250 FOR IP): Performed by: SPECIALIST

## 2022-08-14 PROCEDURE — 85027 COMPLETE CBC AUTOMATED: CPT

## 2022-08-14 PROCEDURE — 2700000000 HC OXYGEN THERAPY PER DAY

## 2022-08-14 PROCEDURE — 80048 BASIC METABOLIC PNL TOTAL CA: CPT

## 2022-08-14 PROCEDURE — 80188 ASSAY OF PRIMIDONE: CPT

## 2022-08-14 PROCEDURE — 80177 DRUG SCRN QUAN LEVETIRACETAM: CPT

## 2022-08-14 PROCEDURE — 80184 ASSAY OF PHENOBARBITAL: CPT

## 2022-08-14 PROCEDURE — 6370000000 HC RX 637 (ALT 250 FOR IP): Performed by: CLINICAL NURSE SPECIALIST

## 2022-08-14 PROCEDURE — 1200000000 HC SEMI PRIVATE

## 2022-08-14 PROCEDURE — 6360000002 HC RX W HCPCS: Performed by: INTERNAL MEDICINE

## 2022-08-14 PROCEDURE — 36415 COLL VENOUS BLD VENIPUNCTURE: CPT

## 2022-08-14 PROCEDURE — 2580000003 HC RX 258: Performed by: INTERNAL MEDICINE

## 2022-08-14 RX ORDER — COLCHICINE 0.6 MG/1
0.6 TABLET ORAL 2 TIMES DAILY
Status: DISCONTINUED | OUTPATIENT
Start: 2022-08-14 | End: 2022-08-15

## 2022-08-14 RX ADMIN — Medication 10 ML: at 08:01

## 2022-08-14 RX ADMIN — FAMOTIDINE 10 MG: 20 TABLET ORAL at 08:01

## 2022-08-14 RX ADMIN — AMLODIPINE BESYLATE 10 MG: 10 TABLET ORAL at 08:01

## 2022-08-14 RX ADMIN — METHYLPREDNISOLONE SODIUM SUCCINATE 40 MG: 40 INJECTION, POWDER, LYOPHILIZED, FOR SOLUTION INTRAMUSCULAR; INTRAVENOUS at 08:01

## 2022-08-14 RX ADMIN — HEPARIN SODIUM 5000 UNITS: 10000 INJECTION INTRAVENOUS; SUBCUTANEOUS at 20:32

## 2022-08-14 RX ADMIN — LEVETIRACETAM 1500 MG: 100 SOLUTION ORAL at 05:20

## 2022-08-14 RX ADMIN — LACOSAMIDE 150 MG: 100 TABLET, FILM COATED ORAL at 20:25

## 2022-08-14 RX ADMIN — LEVETIRACETAM 1500 MG: 100 SOLUTION ORAL at 17:23

## 2022-08-14 RX ADMIN — LACOSAMIDE 150 MG: 100 TABLET, FILM COATED ORAL at 14:03

## 2022-08-14 RX ADMIN — PRIMIDONE 150 MG: 50 TABLET ORAL at 20:25

## 2022-08-14 RX ADMIN — CARVEDILOL 6.25 MG: 6.25 TABLET, FILM COATED ORAL at 08:01

## 2022-08-14 RX ADMIN — CARVEDILOL 6.25 MG: 6.25 TABLET, FILM COATED ORAL at 17:23

## 2022-08-14 RX ADMIN — Medication 10 ML: at 20:25

## 2022-08-14 RX ADMIN — HEPARIN SODIUM 5000 UNITS: 10000 INJECTION INTRAVENOUS; SUBCUTANEOUS at 08:02

## 2022-08-14 RX ADMIN — PRIMIDONE 150 MG: 50 TABLET ORAL at 08:01

## 2022-08-14 RX ADMIN — COLCHICINE 0.6 MG: 0.6 TABLET ORAL at 20:23

## 2022-08-14 RX ADMIN — FOLIC ACID 1 MG: 1 TABLET ORAL at 08:01

## 2022-08-14 RX ADMIN — METHYLPREDNISOLONE SODIUM SUCCINATE 40 MG: 40 INJECTION, POWDER, LYOPHILIZED, FOR SOLUTION INTRAMUSCULAR; INTRAVENOUS at 20:25

## 2022-08-14 RX ADMIN — COLCHICINE 0.6 MG: 0.6 TABLET ORAL at 08:01

## 2022-08-14 RX ADMIN — TACROLIMUS 3 MG: 1 CAPSULE ORAL at 08:01

## 2022-08-14 RX ADMIN — LACOSAMIDE 150 MG: 100 TABLET, FILM COATED ORAL at 05:20

## 2022-08-14 NOTE — PROGRESS NOTES
Subjective:  States joint/wrist pain gone    The patient is awake and alert, ie at her baseline. .  No problems overnight. Denies chest pain, angina, and dyspnea. Denies abdominal pain. Tolerating diet. No nausea or vomiting. Objective:  Unfortunately, did not sit up in chair yesterday. /80   Pulse 79   Temp 99 °F (37.2 °C) (Oral)   Resp 18   Ht 5' (1.524 m)   Wt 110 lb 12.8 oz (50.3 kg)   SpO2 99%   BMI 21.64 kg/m²     Heart:  RRR, no murmurs, gallops, or rubs.   Lungs:  CTA bilaterally, no wheeze, rales dry fibrotic basialr  Abd: bowel sounds present, nontender, nondistended, no masses  Extrem:  No clubbing, cyanosis, or edema  Neuro:  No focal deficits or seizure evidence    CBC:   Lab Results   Component Value Date/Time    WBC 5.3 08/14/2022 02:50 AM    RBC 2.48 08/14/2022 02:50 AM    HGB 8.2 08/14/2022 02:50 AM    HCT 25.2 08/14/2022 02:50 AM    .6 08/14/2022 02:50 AM    MCH 33.1 08/14/2022 02:50 AM    MCHC 32.5 08/14/2022 02:50 AM    RDW 12.5 08/14/2022 02:50 AM     08/14/2022 02:50 AM    MPV 10.3 08/14/2022 02:50 AM     BMP:    Lab Results   Component Value Date/Time     08/14/2022 02:50 AM    K 3.7 08/14/2022 02:50 AM    K 4.3 07/31/2022 10:41 AM     08/14/2022 02:50 AM    CO2 23 08/14/2022 02:50 AM    BUN 13 08/14/2022 02:50 AM    LABALBU 2.6 08/11/2022 03:03 PM    CREATININE 0.6 08/14/2022 02:50 AM    CALCIUM 8.3 08/14/2022 02:50 AM    GFRAA >60 08/14/2022 02:50 AM    LABGLOM >60 08/14/2022 02:50 AM    GLUCOSE 120 08/14/2022 02:50 AM    GLUCOSE 85 03/25/2012 06:25 AM        Assessment:    Patient Active Problem List   Diagnosis    Megaloblastic anemia    Idiopathic pulmonary fibrosis ([facility]    Seizure disorder ([facility]    Osteoporosis    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[facility]    Acute on chronic kidney failure ([facility]    Encounter regarding vascular access for dialysis for ESRD ([facility]    Volume overload    Essential hypertension Shortness of breath    Immunosuppressed status (HCC)    Acute on chronic respiratory failure with hypoxia (HCC)    Acute on chronic diastolic congestive heart failure (HCC)    Generalized seizure disorder (HCC)    Iron deficiency anemia    ESRD (end stage renal disease) on dialysis ([facility]    AVF (arteriovenous fistula) (HCC)    Hospital-acquired pneumonia    Pneumonia    Anemia    Inferior pubic ramus fracture, left, closed, initial encounter ([facility])    Nausea & vomiting    Tear of medial collateral ligament of left knee    TRACI (acute kidney injury) ([facility]    Moderate protein-calorie malnutrition (HCC)    Folic acid deficiency       Plan:  Up in chair. Check med levels again. DC planning in progress.   Unfortunately, at this point, I don't see her as an [place] participant\" in a regimented rehab program.          Libra Thorpe MD  9:00 AM  8/14/2022

## 2022-08-14 NOTE — PROGRESS NOTES
[address] [place] [address] [facility]  Progress Note    SUBJECTIVE:  Chief Complaint   Patient presents with    Fatigue     No nausea vomiting or diarrhea. Patient more awake and interactive today than yesterday. A lot more range of motion in the right wrist.    Review of systems:  As stated above in the chief complaint, otherwise negative. Medications:  Scheduled Meds:   methylPREDNISolone  40 mg IntraVENous Q12H    amLODIPine  10 mg Oral QAM    carvedilol  6.25 mg Oral BID WC    famotidine  10 mg Oral Daily    levETIRAcetam  1,500 mg Oral BID    [Held by provider] predniSONE  5 mg Oral QAM    primidone  150 mg Oral BID    tacrolimus  3 mg Oral Daily    lacosamide  150 mg Oral 3 times per day    folic acid  1 mg Oral Daily    sodium chloride flush  5-40 mL IntraVENous 2 times per day    heparin (porcine)  5,000 Units SubCUTAneous BID     Continuous Infusions:   sodium chloride      sodium chloride Stopped (22 1803)     PRN Meds:white petrolatum, acetaminophen, ondansetron, sodium chloride flush, sodium chloride flush, sodium chloride, [DISCONTINUED] ondansetron **OR** ondansetron    OBJECTIVE:  /80   Pulse 79   Temp 99 °F (37.2 °C) (Oral)   Resp 18   Ht 5' (1.524 m)   Wt 110 lb 12.8 oz (50.3 kg)   SpO2 99%   BMI 21.64 kg/m²   Temp  Av.6 °F (37 °C)  Min: 98.1 °F (36.7 °C)  Max: 99 °F (37.2 °C)  Constitutional: The patient is awake, alert, and oriented. Fatigue seems a little bit improved. Skin: Warm and dry. No rashes were noted. HEENT: Round and reactive pupils. Moist mucous membranes. No ulcerations or thrush. Neck: Supple to movements. Chest: No use of accessory muscles to breathe. Symmetrical expansion. No wheezing, crackles or rhonchi. He is with cannula oxygen  Cardiovascular: S1 and S2 are rhythmic and regular. No murmurs appreciated. Abdomen: Positive bowel sounds to auscultation. Benign to palpation. No masses felt. No hepatosplenomegaly. Pubic. Extremities: No clubbing, no cyanosis, no edema. Bilateral wrist tenderness right wrist with more swelling than the left. The range of motion is much improved on the right. Right AV fistula.   Lines: peripheral    Laboratory and Tests Review:  Lab Results   Component Value Date    WBC 5.3 08/14/2022    WBC 10.4 08/11/2022    WBC 7.2 08/08/2022    HGB 8.2 (L) 08/14/2022    HCT 25.2 (L) 08/14/2022    .6 (H) 08/14/2022     08/14/2022     Lab Results   Component Value Date    NEUTROABS 8.64 (H) 08/11/2022    NEUTROABS 3.40 08/03/2022    NEUTROABS 6.01 07/31/2022     No results found for: [facility]  Lab Results   Component Value Date    ALT 13 08/11/2022    AST 40 (H) 08/11/2022    ALKPHOS 76 08/11/2022    BILITOT 0.5 08/11/2022     Lab Results   Component Value Date/Time     08/14/2022 02:50 AM    K 3.7 08/14/2022 02:50 AM    K 4.3 07/31/2022 10:41 AM     08/14/2022 02:50 AM    CO2 23 08/14/2022 02:50 AM    BUN 13 08/14/2022 02:50 AM    CREATININE 0.6 08/14/2022 02:50 AM    CREATININE 0.5 08/11/2022 03:03 PM    CREATININE 0.7 08/08/2022 04:36 AM    GFRAA >60 08/14/2022 02:50 AM    LABGLOM >60 08/14/2022 02:50 AM    GLUCOSE 120 08/14/2022 02:50 AM    GLUCOSE 85 03/25/2012 06:25 AM    PROT 5.7 08/11/2022 03:03 PM    LABALBU 2.6 08/11/2022 03:03 PM    CALCIUM 8.3 08/14/2022 02:50 AM    BILITOT 0.5 08/11/2022 03:03 PM    ALKPHOS 76 08/11/2022 03:03 PM    AST 40 08/11/2022 03:03 PM    ALT 13 08/11/2022 03:03 PM     Lab Results   Component Value Date    CRP 23.2 (H) 08/12/2022     Lab Results   Component Value Date    SEDRATE 98 (H) 08/12/2022     Radiology:      Microbiology:   Rapid SARS-CoV-2 8/2/2022: Negative  Urine culture 8/4/2022: Negative  Blood cultures 8/3/2022: Negative x2  Blood cultures 8/11/2022: Negative so far  Rapid SARS-CoV-2 8/12/2022: Negative  Respiratory panel: Negative    Assessment:  Gouty arthritis-bilateral wrists-right versus the left  Fever probably secondary to her gouty arthritis. This seems to have improved after starting colchicine  Status post right lung transplant  Immunocompromised host  ESRD    Plan:    Continue stress doses of steroids  Colchicine switched to BID  Check cultures  Respiratory panel  Will follow with you    ESTRELLA Pratt - CNS  9:02 AM  8/14/2022     Patient seen and examined. I had a face to face encounter with the patient. Agree with exam.  Assessment and plan as outlined above and directed by me. Addition and corrections were done as deemed appropriate. My exam and plan include: Patient is doing significantly better. The inflammatory process on the wrist has improved. Continue colchicine twice daily.     Alberta Silveira MD  8/14/2022  12:40 PM

## 2022-08-15 LAB
ANION GAP SERPL CALCULATED.3IONS-SCNC: 11 MMOL/L (ref 7–16)
BUN BLDV-MCNC: 17 MG/DL (ref 6–23)
CALCIUM SERPL-MCNC: 8.8 MG/DL (ref 8.6–10.2)
CHLORIDE BLD-SCNC: 102 MMOL/L (ref 98–107)
CO2: 21 MMOL/L (ref 22–29)
CREAT SERPL-MCNC: 0.6 MG/DL (ref 0.5–1)
GFR AFRICAN AMERICAN: >60
GFR NON-AFRICAN AMERICAN: >60 ML/MIN/1.73
GLUCOSE BLD-MCNC: 149 MG/DL (ref 74–99)
HCT VFR BLD CALC: 28.4 % (ref 34–48)
HEMOGLOBIN: 9.1 G/DL (ref 11.5–15.5)
MCH RBC QN AUTO: 33.5 PG (ref 26–35)
MCHC RBC AUTO-ENTMCNC: 32 % (ref 32–34.5)
MCV RBC AUTO: 104.4 FL (ref 80–99.9)
PDW BLD-RTO: 12.7 FL (ref 11.5–15)
PLATELET # BLD: 266 E9/L (ref 130–450)
PMV BLD AUTO: 10.3 FL (ref 7–12)
POTASSIUM SERPL-SCNC: 4 MMOL/L (ref 3.5–5)
RBC # BLD: 2.72 E12/L (ref 3.5–5.5)
SODIUM BLD-SCNC: 134 MMOL/L (ref 132–146)
WBC # BLD: 5.4 E9/L (ref 4.5–11.5)

## 2022-08-15 PROCEDURE — 85027 COMPLETE CBC AUTOMATED: CPT

## 2022-08-15 PROCEDURE — 80048 BASIC METABOLIC PNL TOTAL CA: CPT

## 2022-08-15 PROCEDURE — 1200000000 HC SEMI PRIVATE

## 2022-08-15 PROCEDURE — 97530 THERAPEUTIC ACTIVITIES: CPT

## 2022-08-15 PROCEDURE — 6370000000 HC RX 637 (ALT 250 FOR IP): Performed by: INTERNAL MEDICINE

## 2022-08-15 PROCEDURE — 6370000000 HC RX 637 (ALT 250 FOR IP): Performed by: CLINICAL NURSE SPECIALIST

## 2022-08-15 PROCEDURE — 36415 COLL VENOUS BLD VENIPUNCTURE: CPT

## 2022-08-15 PROCEDURE — 2580000003 HC RX 258: Performed by: INTERNAL MEDICINE

## 2022-08-15 PROCEDURE — 6360000002 HC RX W HCPCS: Performed by: INTERNAL MEDICINE

## 2022-08-15 RX ORDER — PREDNISONE 1 MG/1
10 TABLET ORAL 2 TIMES DAILY WITH MEALS
Status: DISCONTINUED | OUTPATIENT
Start: 2022-08-18 | End: 2022-08-17 | Stop reason: HOSPADM

## 2022-08-15 RX ORDER — PREDNISONE 20 MG/1
20 TABLET ORAL 2 TIMES DAILY WITH MEALS
Status: DISCONTINUED | OUTPATIENT
Start: 2022-08-15 | End: 2022-08-17 | Stop reason: HOSPADM

## 2022-08-15 RX ORDER — PREDNISONE 1 MG/1
10 TABLET ORAL DAILY
Status: DISCONTINUED | OUTPATIENT
Start: 2022-08-15 | End: 2022-08-15

## 2022-08-15 RX ORDER — COLCHICINE 0.6 MG/1
0.6 TABLET ORAL DAILY
Status: DISCONTINUED | OUTPATIENT
Start: 2022-08-16 | End: 2022-08-17 | Stop reason: HOSPADM

## 2022-08-15 RX ORDER — LEVETIRACETAM 500 MG/1
1500 TABLET ORAL 2 TIMES DAILY
Status: DISCONTINUED | OUTPATIENT
Start: 2022-08-15 | End: 2022-08-17

## 2022-08-15 RX ORDER — PREDNISONE 1 MG/1
5 TABLET ORAL DAILY
Status: DISCONTINUED | OUTPATIENT
Start: 2022-08-21 | End: 2022-08-17 | Stop reason: HOSPADM

## 2022-08-15 RX ADMIN — AMLODIPINE BESYLATE 10 MG: 10 TABLET ORAL at 09:24

## 2022-08-15 RX ADMIN — HEPARIN SODIUM 5000 UNITS: 10000 INJECTION INTRAVENOUS; SUBCUTANEOUS at 22:00

## 2022-08-15 RX ADMIN — PRIMIDONE 150 MG: 50 TABLET ORAL at 22:00

## 2022-08-15 RX ADMIN — Medication 10 ML: at 09:30

## 2022-08-15 RX ADMIN — COLCHICINE 0.6 MG: 0.6 TABLET ORAL at 09:25

## 2022-08-15 RX ADMIN — FOLIC ACID 1 MG: 1 TABLET ORAL at 09:26

## 2022-08-15 RX ADMIN — Medication 10 ML: at 22:00

## 2022-08-15 RX ADMIN — LACOSAMIDE 150 MG: 100 TABLET, FILM COATED ORAL at 22:00

## 2022-08-15 RX ADMIN — PRIMIDONE 150 MG: 50 TABLET ORAL at 09:26

## 2022-08-15 RX ADMIN — CARVEDILOL 6.25 MG: 6.25 TABLET, FILM COATED ORAL at 17:54

## 2022-08-15 RX ADMIN — PREDNISONE 20 MG: 20 TABLET ORAL at 09:25

## 2022-08-15 RX ADMIN — FAMOTIDINE 10 MG: 20 TABLET ORAL at 09:23

## 2022-08-15 RX ADMIN — LACOSAMIDE 150 MG: 100 TABLET, FILM COATED ORAL at 17:42

## 2022-08-15 RX ADMIN — LEVETIRACETAM 1500 MG: 100 SOLUTION ORAL at 05:57

## 2022-08-15 RX ADMIN — HEPARIN SODIUM 5000 UNITS: 10000 INJECTION INTRAVENOUS; SUBCUTANEOUS at 09:28

## 2022-08-15 RX ADMIN — LACOSAMIDE 150 MG: 100 TABLET, FILM COATED ORAL at 05:57

## 2022-08-15 RX ADMIN — CARVEDILOL 6.25 MG: 6.25 TABLET, FILM COATED ORAL at 09:26

## 2022-08-15 RX ADMIN — LEVETIRACETAM 1500 MG: 500 TABLET, FILM COATED ORAL at 22:00

## 2022-08-15 RX ADMIN — TACROLIMUS 3 MG: 1 CAPSULE ORAL at 09:27

## 2022-08-15 RX ADMIN — PREDNISONE 20 MG: 20 TABLET ORAL at 17:54

## 2022-08-15 NOTE — CARE COORDINATION
Transition of Care-Met with patient and  bedside to discuss discharge planning. [facility] required peer to peer, per physician note, unable to initiate. [facility] unable to accept as well as [facility]. Reviewed other SNF choices, [facility]st choice is [facility], second choice is [facility], reached out to both facilities to see if beds were available or were able to accept. Third choice is [facility] and last is [address] Awaiting acceptance . Addendum: [facility] can accept-will initiate pre cert as soon as therapy scores are in. [facility], transportation form and envelope in soft chart.     Stanley LENNONN, RN  [address] [facility]

## 2022-08-15 NOTE — PROGRESS NOTES
Physical Therapy  Facility/Department: [facility]  Daily Treatment Note  NAME: Marshall Samson  : 1955  MRN: [id number]    Date of Service: 8/15/2022        Patient Diagnosis(es): The primary encounter diagnosis was TRACI (acute kidney injury) ([facility] A diagnosis of Other fatigue was also pertinent to this visit. Patient Diagnosis(es): The primary encounter diagnosis was TRACI (acute kidney injury) ([facility] A diagnosis of Other fatigue was also pertinent to this visit. Evaluating Therapist: Rashaun Tanner PT     Room #:  8743/9941-V  Diagnosis:  TRACI (acute kidney injury) ([facility] [N17.9]  Other fatigue [R53.83]  PMHx/PSHx:  CHF, CKD, hx of lung transplant, Epilepsy  Precautions:  falls, alarm        Social:  Pt lives with  in a 1 floor plan with ramped entrance. Ambulates with ww. Initial Evaluation  Date: 22 Treatment  8/15/2022 Short Term/ Long Term   Goals   Was pt agreeable to Eval/treatment? yes With encouragement      Does pt have pain? Neck pain None reported      Bed Mobility Rolling: max assist  Supine to sit: max assist  Sit to supine: max assist  Scooting: max assist Rolling: max assist   Supine to sit: Max A  Sit to supine:  NT     Scooting: Max A to sitting EOB    Min assist   Transfers Sit to stand: max assist  Stand to sit: max assist  Stand pivot: NT Sit to stand :  Mod assist   Stand to sit : max assist  Min assist   Ambulation    NT 10 feet x 1 with ww mod assist  25 feet with ww with min assist   Stair Negotiation  Ascended and descended  NT    N/A   LE strength     B ankle dorsiflexion 0/5 all else 3-/5     3+/5   balance      poor       AM-PAC Raw score               10/24 11/24           Patient education  Pt educated on PT objectives during treatment session, posture while sitting EOB     Patient response to education:  Pt verbalized understanding Pt demonstrated skill Pt requires further education in this area       yes      ASSESSMENT:     Comments:  Pt found in bed  and left up in chair with call light in reach and alarm on. Treatment:  Patient practiced and was instructed in the following treatment:  Functional mobility performed as documented above. Pt's spouse present for session. Pt willing to participate after encouragement given . Log rolling technique with supine to sit; cues for sequencing and technique with bed mobility . Instructed in hand placement with transfers and posture in sit and stand. Pt needed assist with ww maneuvering and balance with gait. Fatigues quickly         PLAN:     Patient is making progress towards established goals. Will continue with current POC.      Time in  1305   Time out  1318     Total Treatment Time  13 minutes     CPT codes:     [x] Therapeutic activities 17626 13 minutes  [] Therapeutic exercises 17537  minutes    Lian Alberto   PT 2998

## 2022-08-15 NOTE — PROGRESS NOTES
Called as requested in the 8/12 Care Coordinator note with the reference number provided. I was unable to initiate a Peer-to Review because of inadequate/unavailable  demographic information. Time spent:  9918-0342. I will be seeing patients on a scheduled/on-time basis in my office for the rest of the day.

## 2022-08-15 NOTE — PROGRESS NOTES
access for dialysis for ESRD ([facility]    Volume overload    Essential hypertension    Shortness of breath    Immunosuppressed status (HCC)    Acute on chronic respiratory failure with hypoxia (HCC)    Acute on chronic diastolic congestive heart failure (HCC)    Generalized seizure disorder (HCC)    Iron deficiency anemia    ESRD (end stage renal disease) on dialysis ([facility])    AVF (arteriovenous fistula) ([facility])    Hospital-acquired pneumonia    Pneumonia    Anemia    Inferior pubic ramus fracture, left, closed, initial encounter ([facility])    Nausea & vomiting    Tear of medial collateral ligament of left knee    TRACI (acute kidney injury) ([facility]    Moderate protein-calorie malnutrition ([facility])    Folic acid deficiency       Plan:  Change to po prednisone. DC planning in progress. To [facility] if approved.         Sheeba Whittaker MD  6:51 AM  8/15/2022

## 2022-08-15 NOTE — PLAN OF CARE
Problem: Discharge Planning  Goal: Discharge to home or other facility with appropriate resources  Outcome: Progressing     Problem: Skin/Tissue Integrity  Goal: Absence of new skin breakdown  Description: 1. Monitor for areas of redness and/or skin breakdown  2. Assess vascular access sites hourly  3. Every 4-6 hours minimum:  Change oxygen saturation probe site  4. Every 4-6 hours:  If on nasal continuous positive airway pressure, respiratory therapy assess nares and determine need for appliance change or resting period.   Outcome: Progressing     Problem: Safety - Adult  Goal: Free from fall injury  Outcome: Progressing     Problem: Chronic Conditions and Co-morbidities  Goal: Patient's chronic conditions and co-morbidity symptoms are monitored and maintained or improved  Outcome: Progressing     Problem: Nutrition Deficit:  Goal: Optimize nutritional status  Outcome: Progressing     Problem: Pain  Goal: Verbalizes/displays adequate comfort level or baseline comfort level  Outcome: Progressing

## 2022-08-15 NOTE — PROGRESS NOTES
[address] [place] [address] [facility]  Progress Note    SUBJECTIVE:  Chief Complaint   Patient presents with    Fatigue     Patient is sitting up in the chair, tired  Tolerating medications  No fevers  Denies pain to the wrist today     Review of systems:  As stated above in the chief complaint, otherwise negative. Medications:  Scheduled Meds:   levETIRAcetam  1,500 mg Oral BID    predniSONE  20 mg Oral BID WC    Followed by    Vince Scott ON 2022] predniSONE  10 mg Oral BID WC    Followed by    Vince Scott ON 2022] predniSONE  5 mg Oral Daily    colchicine  0.6 mg Oral BID    amLODIPine  10 mg Oral QAM    carvedilol  6.25 mg Oral BID WC    famotidine  10 mg Oral Daily    primidone  150 mg Oral BID    tacrolimus  3 mg Oral Daily    lacosamide  150 mg Oral 3 times per day    folic acid  1 mg Oral Daily    sodium chloride flush  5-40 mL IntraVENous 2 times per day    heparin (porcine)  5,000 Units SubCUTAneous BID     Continuous Infusions:   sodium chloride       PRN Meds:white petrolatum, acetaminophen, ondansetron, sodium chloride flush, sodium chloride flush, sodium chloride, [DISCONTINUED] ondansetron **OR** ondansetron    OBJECTIVE:  BP (!) 133/93   Pulse 73   Temp 98 °F (36.7 °C) (Oral)   Resp 16   Ht 5' (1.524 m)   Wt 103 lb 1.6 oz (46.8 kg)   SpO2 99%   BMI 20.14 kg/m²   Temp  Av.6 °F (36.4 °C)  Min: 97 °F (36.1 °C)  Max: 98 °F (36.7 °C)  Constitutional: The patient is awake, alert, and oriented. Sitting up in the chair, resting. Skin: Warm and dry. No rashes were noted. HEENT: Round and reactive pupils. Moist mucous membranes. No ulcerations or thrush. Neck: Supple to movements. Chest: No respiratory distress. Symmetrical expansion. No wheezing, crackles or rhonchi. Cardiovascular: S1 and S2 are rhythmic and regular. No murmurs appreciated. Abdomen: Positive bowel sounds to auscultation. Benign to palpation. No masses felt. Extremities: No edema.   Bilateral wrist tenderness is improved. Swelling and ROM is improved   Right AV fistula. Lines: peripheral    Laboratory and Tests Review:  Lab Results   Component Value Date    WBC 5.4 08/15/2022    WBC 5.3 08/14/2022    WBC 10.4 08/11/2022    HGB 9.1 (L) 08/15/2022    HCT 28.4 (L) 08/15/2022    .4 (H) 08/15/2022     08/15/2022     Lab Results   Component Value Date    NEUTROABS 8.64 (H) 08/11/2022    NEUTROABS 3.40 08/03/2022    NEUTROABS 6.01 07/31/2022     No results found for: [facility]  Lab Results   Component Value Date    ALT 13 08/11/2022    AST 40 (H) 08/11/2022    ALKPHOS 76 08/11/2022    BILITOT 0.5 08/11/2022     Lab Results   Component Value Date/Time     08/15/2022 02:43 AM    K 4.0 08/15/2022 02:43 AM    K 4.3 07/31/2022 10:41 AM     08/15/2022 02:43 AM    CO2 21 08/15/2022 02:43 AM    BUN 17 08/15/2022 02:43 AM    CREATININE 0.6 08/15/2022 02:43 AM    CREATININE 0.6 08/14/2022 02:50 AM    CREATININE 0.5 08/11/2022 03:03 PM    GFRAA >60 08/15/2022 02:43 AM    LABGLOM >60 08/15/2022 02:43 AM    GLUCOSE 149 08/15/2022 02:43 AM    GLUCOSE 85 03/25/2012 06:25 AM    PROT 5.7 08/11/2022 03:03 PM    LABALBU 2.6 08/11/2022 03:03 PM    CALCIUM 8.8 08/15/2022 02:43 AM    BILITOT 0.5 08/11/2022 03:03 PM    ALKPHOS 76 08/11/2022 03:03 PM    AST 40 08/11/2022 03:03 PM    ALT 13 08/11/2022 03:03 PM     Lab Results   Component Value Date    CRP 23.2 (H) 08/12/2022     Lab Results   Component Value Date    SEDRATE 98 (H) 08/12/2022     Radiology:      Microbiology:   Rapid SARS-CoV-2 8/2/2022: Negative  Urine culture 8/4/2022: Negative  Blood cultures 8/3/2022: Negative final   Blood cultures 8/11/2022: Negative so far  Rapid SARS-CoV-2 8/12/2022: Negative  Respiratory panel: Negative    Assessment:  Gouty arthritis-bilateral wrists-right versus the left  Fever probably secondary to her gouty arthritis.   This seems to have improved after starting colchicine  Status post right lung transplant  Immunocompromised host  ESRD    Plan:    Continue Colchicine -- decreased to daily x 3 more days   Labs and cultures reviewed   Discharge plan is pending   No further recommendations from ID -- we will sign off    ESTRELLA Manriquez CNP  11:37 AM  8/15/2022     Patient seen and examined. I had a face to face encounter with the patient. Agree with exam.  Assessment and plan as outlined above and directed by me. Addition and corrections were done as deemed appropriate. My exam and plan include: The patient is doing significantly better. This is because of the steroids and the colchicine. The colchicine can be discontinued in a couple of days. No further recommendations. We will sign off. Spoke with .     Adelia Moore MD  8/15/2022  1:49 PM

## 2022-08-15 NOTE — PROGRESS NOTES
Occupational Therapy  OT BEDSIDE TREATMENT NOTE      Date:8/15/2022  Patient Name: Amanda Concepcion  MRN: [id number]  : 1955  Room: [address] [place] [address]     Evaluating OT: Roman Mcpherson OTR/L   [id number]       Referring Pamela Minor MD    Specific Provider Orders/Date:OT eval and treat 2022       Diagnosis:  TRACI (acute kidney injury) ([facility] [N17.9]  Other fatigue [R53.83]     Pertinent Medical History: hemiplegia, dialysis, CHF, falls, osteoporosis , back surgery, CVA, seizure     Precautions:  Fall Risk      Assessment of current deficits   [x] Functional mobility            [x]ADLs           [x] Strength                  [x]Cognition   [x] Functional transfers          [x] IADLs         [x] Safety Awareness   [x]Endurance   [] Fine Coordination                         [x] Balance      [] Vision/perception   []Sensation      []Gross Motor Coordination             [] ROM           [] Delirium                   [] Motor Control     OT PLAN OF CARE   OT POC based on physician orders, patient diagnosis and results of clinical assessment     Frequency/Duration  2-4 days/wk for 2 weeks PRN  Specific OT Treatment Interventions to include:   ADL retraining/adapted techniques and AE recommendations to increase functional independence within precautions                    Energy conservation techniques to improve tolerance for selfcare routine  Functional transfer/mobility training/DME recommendations for increased independence, safety and fall prevention         Patient/family education to increase safety and functional independence             Environmental modifications for safe mobility and completion of ADLs                             Therapeutic activity to improve functional performance during ADLs.                                          Therapeutic exercise to improve tolerance and functional strength for ADLs   Balance retraining/tolerance tasks for facilitation of postural control with dynamic challenges during ADLs . Positioning to improve functional independence  Cognitive retraining ex's to improve problem solving skills & safe participation in ADLs/IADLs           Recommended Adaptive Equipment: continue to assess      Home Living: Pt lives with , 1 story with ramp    Bathroom setup: tub/shower . Tub bench, elevated toilet seat    Equipment owned: walker , wheelchair, raised toilet seat      Prior Level of Function: recent assist as  with ADLs , assist  with IADLs; ambulated with walker         Pain Level: patient did not complain of pain  Cognition: Awake, slow to respond but follows commands                Functional Assessment:  AM-PAC Daily Activity Raw Score: 12/24    Initial Eval Status  Date: 8/4/22 Treatment Status  Date:8/15/22 STGs = LTGs  Time frame: 10-14 days   Feeding Min A Mod A to bring cup to mouth and drink from straw Supervision    Grooming Mod A,seated  Decrease standing balance/tolerance     Patient cued to lift her head up - neck flexed position -  reports this has been patient's position more recently Patient observed adjusting/donning glasses while seated EOB SBA    UB Dressing Max A Max A to arrange gown    SBA    LB Dressing Max A Max A Min A   Bathing Max A   Min A    Toileting Max A Pure wick    Min A    Bed Mobility  Max A  Supine <>sit  supine to sit max A CGA   Functional Transfers Max A  Sit - stand from bed  sit to stand mod A  Stand to sit max A due to fatigue Min A    Functional Mobility Attempted side steps to [facility]  Max A using walket  Patient fatigued , feeling alittle dizzy - returned to bed Mod A with WW in room for short distance Min A with good tolerance   Balance Sitting:    Static:  Min A- neck flexed     Dynamic:Max  A  Standing: Max A     sitting balance min A, patient with neck flexed, cues to correct with minimal carry over.   Standing balance mod A with [employer] SBA - sitting  Min/CGA - standing    Activity Tolerance Poor with light activity  No SOB

## 2022-08-16 LAB
ANION GAP SERPL CALCULATED.3IONS-SCNC: 11 MMOL/L (ref 7–16)
BLOOD CULTURE, ROUTINE: NORMAL
BUN BLDV-MCNC: 16 MG/DL (ref 6–23)
CALCIUM SERPL-MCNC: 8.7 MG/DL (ref 8.6–10.2)
CHLORIDE BLD-SCNC: 101 MMOL/L (ref 98–107)
CO2: 23 MMOL/L (ref 22–29)
CREAT SERPL-MCNC: 0.6 MG/DL (ref 0.5–1)
CULTURE, BLOOD 2: NORMAL
GFR AFRICAN AMERICAN: >60
GFR NON-AFRICAN AMERICAN: >60 ML/MIN/1.73
GLUCOSE BLD-MCNC: 104 MG/DL (ref 74–99)
HCT VFR BLD CALC: 26.3 % (ref 34–48)
HEMOGLOBIN: 8.4 G/DL (ref 11.5–15.5)
KEPPRA: 98 UG/ML (ref 10–40)
MCH RBC QN AUTO: 32.8 PG (ref 26–35)
MCHC RBC AUTO-ENTMCNC: 31.9 % (ref 32–34.5)
MCV RBC AUTO: 102.7 FL (ref 80–99.9)
PDW BLD-RTO: 12.7 FL (ref 11.5–15)
PHENOBARBITAL LEVEL: 10.6 UG/ML (ref 15–40)
PLATELET # BLD: 265 E9/L (ref 130–450)
PMV BLD AUTO: 10.4 FL (ref 7–12)
POTASSIUM SERPL-SCNC: 3.7 MMOL/L (ref 3.5–5)
PRIMIDONE LEVEL: 10.8 UG/ML (ref 5–12)
RBC # BLD: 2.56 E12/L (ref 3.5–5.5)
SODIUM BLD-SCNC: 135 MMOL/L (ref 132–146)
WBC # BLD: 4.6 E9/L (ref 4.5–11.5)

## 2022-08-16 PROCEDURE — 80048 BASIC METABOLIC PNL TOTAL CA: CPT

## 2022-08-16 PROCEDURE — 6360000002 HC RX W HCPCS: Performed by: INTERNAL MEDICINE

## 2022-08-16 PROCEDURE — 1200000000 HC SEMI PRIVATE

## 2022-08-16 PROCEDURE — 36415 COLL VENOUS BLD VENIPUNCTURE: CPT

## 2022-08-16 PROCEDURE — 85027 COMPLETE CBC AUTOMATED: CPT

## 2022-08-16 PROCEDURE — 2580000003 HC RX 258: Performed by: INTERNAL MEDICINE

## 2022-08-16 PROCEDURE — 6370000000 HC RX 637 (ALT 250 FOR IP): Performed by: REGISTERED NURSE

## 2022-08-16 PROCEDURE — 6370000000 HC RX 637 (ALT 250 FOR IP): Performed by: INTERNAL MEDICINE

## 2022-08-16 RX ADMIN — FOLIC ACID 1 MG: 1 TABLET ORAL at 09:00

## 2022-08-16 RX ADMIN — Medication 10 ML: at 21:24

## 2022-08-16 RX ADMIN — LEVETIRACETAM 1500 MG: 500 TABLET, FILM COATED ORAL at 21:22

## 2022-08-16 RX ADMIN — AMLODIPINE BESYLATE 10 MG: 10 TABLET ORAL at 09:00

## 2022-08-16 RX ADMIN — LACOSAMIDE 150 MG: 100 TABLET, FILM COATED ORAL at 21:23

## 2022-08-16 RX ADMIN — HEPARIN SODIUM 5000 UNITS: 10000 INJECTION INTRAVENOUS; SUBCUTANEOUS at 21:22

## 2022-08-16 RX ADMIN — LEVETIRACETAM 1500 MG: 500 TABLET, FILM COATED ORAL at 09:00

## 2022-08-16 RX ADMIN — HEPARIN SODIUM 5000 UNITS: 10000 INJECTION INTRAVENOUS; SUBCUTANEOUS at 09:02

## 2022-08-16 RX ADMIN — FAMOTIDINE 10 MG: 20 TABLET ORAL at 09:02

## 2022-08-16 RX ADMIN — CARVEDILOL 6.25 MG: 6.25 TABLET, FILM COATED ORAL at 17:08

## 2022-08-16 RX ADMIN — TACROLIMUS 3 MG: 1 CAPSULE ORAL at 09:01

## 2022-08-16 RX ADMIN — ONDANSETRON 4 MG: 2 INJECTION INTRAMUSCULAR; INTRAVENOUS at 21:31

## 2022-08-16 RX ADMIN — PRIMIDONE 150 MG: 50 TABLET ORAL at 21:23

## 2022-08-16 RX ADMIN — Medication 10 ML: at 09:10

## 2022-08-16 RX ADMIN — LACOSAMIDE 150 MG: 100 TABLET, FILM COATED ORAL at 13:59

## 2022-08-16 RX ADMIN — COLCHICINE 0.6 MG: 0.6 TABLET ORAL at 09:01

## 2022-08-16 RX ADMIN — LACOSAMIDE 150 MG: 100 TABLET, FILM COATED ORAL at 05:48

## 2022-08-16 RX ADMIN — PRIMIDONE 150 MG: 50 TABLET ORAL at 09:01

## 2022-08-16 RX ADMIN — PREDNISONE 20 MG: 20 TABLET ORAL at 09:00

## 2022-08-16 RX ADMIN — CARVEDILOL 6.25 MG: 6.25 TABLET, FILM COATED ORAL at 09:02

## 2022-08-16 RX ADMIN — PREDNISONE 20 MG: 20 TABLET ORAL at 17:07

## 2022-08-16 ASSESSMENT — PAIN SCALES - GENERAL
PAINLEVEL_OUTOF10: 2
PAINLEVEL_OUTOF10: 1
PAINLEVEL_OUTOF10: 0

## 2022-08-16 ASSESSMENT — PAIN SCALES - WONG BAKER: WONGBAKER_NUMERICALRESPONSE: 0

## 2022-08-16 ASSESSMENT — PAIN - FUNCTIONAL ASSESSMENT: PAIN_FUNCTIONAL_ASSESSMENT: ACTIVITIES ARE NOT PREVENTED

## 2022-08-16 ASSESSMENT — PAIN DESCRIPTION - DESCRIPTORS: DESCRIPTORS: ACHING

## 2022-08-16 ASSESSMENT — PAIN DESCRIPTION - ORIENTATION: ORIENTATION: MID

## 2022-08-16 NOTE — PROGRESS NOTES
Immunosuppressed status ([facility]    Acute on chronic respiratory failure with hypoxia (HCC)    Acute on chronic diastolic congestive heart failure (HCC)    Generalized seizure disorder (HCC)    Iron deficiency anemia    ESRD (end stage renal disease) on dialysis ([facility]    AVF (arteriovenous fistula) (HCC)    Hospital-acquired pneumonia    Pneumonia    Anemia    Inferior pubic ramus fracture, left, closed, initial encounter ([facility])    Nausea & vomiting    Tear of medial collateral ligament of left knee    TRACI (acute kidney injury) ([facility]    Moderate protein-calorie malnutrition (HCC)    Folic acid deficiency       Plan:  Present tx. DC planning in progress.           Va Jordan MD  6:56 AM  8/16/2022

## 2022-08-16 NOTE — CARE COORDINATION
Social work / Discharge Planning:         Awaiting precert approval for [facility]. Will need negative covid result on day of discharge.    Electronically signed by SANDRA Wynne on 8/16/2022 at 8:20 AM

## 2022-08-16 NOTE — PLAN OF CARE
Problem: Discharge Planning  Goal: Discharge to home or other facility with appropriate resources  8/16/2022 0519 by Hugo Avalos RN  Outcome: Progressing  8/16/2022 0509 by Hugo Avalos RN  Outcome: Progressing  Flowsheets  Taken 8/16/2022 0509  Discharge to home or other facility with appropriate resources:   Identify barriers to discharge with patient and caregiver   Arrange for needed discharge resources and transportation as appropriate  Taken 8/15/2022 2030  Discharge to home or other facility with appropriate resources: Identify barriers to discharge with patient and caregiver     Problem: Skin/Tissue Integrity  Goal: Absence of new skin breakdown  Description: 1. Monitor for areas of redness and/or skin breakdown  2. Assess vascular access sites hourly  3. Every 4-6 hours minimum:  Change oxygen saturation probe site  4. Every 4-6 hours:  If on nasal continuous positive airway pressure, respiratory therapy assess nares and determine need for appliance change or resting period.   8/16/2022 0519 by Hugo Avalos RN  Outcome: Progressing  8/16/2022 0509 by Hugo Avalos RN  Outcome: Progressing     Problem: Safety - Adult  Goal: Free from fall injury  8/16/2022 0519 by Hugo Avalos RN  Flowsheets (Taken 8/16/2022 1588)  Free From Fall Injury:   Instruct family/caregiver on patient safety   Based on caregiver fall risk screen, instruct family/caregiver to ask for assistance with transferring infant if caregiver noted to have fall risk factors  8/16/2022 0509 by Hugo Avalos RN  Outcome: Jeferson Abbott (Taken 8/15/2022 1756 by Blake Loja RN)  Free From Fall Injury: Instruct family/caregiver on patient safety     Problem: Chronic Conditions and Co-morbidities  Goal: Patient's chronic conditions and co-morbidity symptoms are monitored and maintained or improved  Flowsheets  Taken 8/16/2022 0519  Care Plan - Patient's Chronic Conditions and Co-Morbidity Symptoms are Monitored and Maintained or Improved:   Monitor and assess patient's chronic conditions and comorbid symptoms for stability, deterioration, or improvement   Collaborate with multidisciplinary team to address chronic and comorbid conditions and prevent exacerbation or deterioration  Taken 8/15/2022 2030  Care Plan - Patient's Chronic Conditions and Co-Morbidity Symptoms are Monitored and Maintained or Improved: Monitor and assess patient's chronic conditions and comorbid symptoms for stability, deterioration, or improvement     Problem: Pain  Goal: Verbalizes/displays adequate comfort level or baseline comfort level  Flowsheets (Taken 8/16/2022 0519)  Verbalizes/displays adequate comfort level or baseline comfort level:   Assess pain using appropriate pain scale   Encourage patient to monitor pain and request assistance   Implement non-pharmacological measures as appropriate and evaluate response

## 2022-08-16 NOTE — PLAN OF CARE
Problem: Discharge Planning  Goal: Discharge to home or other facility with appropriate resources  8/16/2022 1807 by Neeru Jose LPN  Outcome: Progressing  8/16/2022 0519 by Mady Lockett RN  Outcome: Progressing  8/16/2022 0509 by Mady Lockett RN  Outcome: Progressing  Flowsheets  Taken 8/16/2022 0509  Discharge to home or other facility with appropriate resources:   Identify barriers to discharge with patient and caregiver   Arrange for needed discharge resources and transportation as appropriate  Taken 8/15/2022 2030  Discharge to home or other facility with appropriate resources: Identify barriers to discharge with patient and caregiver     Problem: Skin/Tissue Integrity  Goal: Absence of new skin breakdown  Description: 1. Monitor for areas of redness and/or skin breakdown  2. Assess vascular access sites hourly  3. Every 4-6 hours minimum:  Change oxygen saturation probe site  4. Every 4-6 hours:  If on nasal continuous positive airway pressure, respiratory therapy assess nares and determine need for appliance change or resting period.   8/16/2022 1807 by Neeru Jose LPN  Outcome: Progressing  8/16/2022 0519 by Mady Lockett RN  Outcome: Progressing  8/16/2022 0509 by Mady Lockett RN  Outcome: Progressing     Problem: Safety - Adult  Goal: Free from fall injury  8/16/2022 1807 by Neeru Jose LPN  Outcome: Progressing  8/16/2022 0519 by Mady Lockett RN  Flowsheets (Taken 8/16/2022 3012)  Free From Fall Injury:   Instruct family/caregiver on patient safety   Based on caregiver fall risk screen, instruct family/caregiver to ask for assistance with transferring infant if caregiver noted to have fall risk factors  8/16/2022 0509 by Mady Lockett RN  Outcome: Progressing  Flowsheets (Taken 8/15/2022 1756 by Sheeba Agee RN)  Free From Fall Injury: Instruct family/caregiver on patient safety     Problem: Chronic Conditions and Co-morbidities  Goal: Patient's chronic conditions and co-morbidity symptoms are monitored and maintained or improved  8/16/2022 1807 by Yanni Epstein LPN  Outcome: Progressing  8/16/2022 0519 by Tino Fabian RN  Flowsheets  Taken 8/16/2022 6383  Care Plan - Patient's Chronic Conditions and Co-Morbidity Symptoms are Monitored and Maintained or Improved:   Monitor and assess patient's chronic conditions and comorbid symptoms for stability, deterioration, or improvement   Collaborate with multidisciplinary team to address chronic and comorbid conditions and prevent exacerbation or deterioration  Taken 8/15/2022 2030  Care Plan - Patient's Chronic Conditions and Co-Morbidity Symptoms are Monitored and Maintained or Improved: Monitor and assess patient's chronic conditions and comorbid symptoms for stability, deterioration, or improvement     Problem: Nutrition Deficit:  Goal: Optimize nutritional status  Outcome: Progressing     Problem: Pain  Goal: Verbalizes/displays adequate comfort level or baseline comfort level  8/16/2022 1807 by Yanni Epstein LPN  Outcome: Progressing  8/16/2022 0519 by Tino Fabian RN  Flowsheets (Taken 8/16/2022 4096)  Verbalizes/displays adequate comfort level or baseline comfort level:   Assess pain using appropriate pain scale   Encourage patient to monitor pain and request assistance   Implement non-pharmacological measures as appropriate and evaluate response

## 2022-08-16 NOTE — PROGRESS NOTES
[facility] Quality Flow/Interdisciplinary Rounds Progress Note        Quality Flow Rounds held on August 16, 2022    Disciplines Attending:  Bedside Nurse, , , and Nursing Unit Leadership    Kelsea Oh was admitted on 8/3/2022  8:35 PM    Anticipated Discharge Date:       Disposition:    Suman Score:  Suman Scale Score: 15    Readmission Risk              Risk of Unplanned Readmission:  23           Discussed patient goal for the day, patient clinical progression, and barriers to discharge. The following Goal(s) of the Day/Commitment(s) have been identified:  Auth submitted 8/15 for Izora Age, discharge pending precert.       Bandar Ramos RN  August 16, 2022

## 2022-08-17 VITALS
BODY MASS INDEX: 20.91 KG/M2 | WEIGHT: 106.5 LBS | DIASTOLIC BLOOD PRESSURE: 72 MMHG | HEIGHT: 60 IN | RESPIRATION RATE: 17 BRPM | HEART RATE: 71 BPM | OXYGEN SATURATION: 97 % | TEMPERATURE: 97.9 F | SYSTOLIC BLOOD PRESSURE: 112 MMHG

## 2022-08-17 LAB — SARS-COV-2, NAAT: NOT DETECTED

## 2022-08-17 PROCEDURE — 2580000003 HC RX 258: Performed by: INTERNAL MEDICINE

## 2022-08-17 PROCEDURE — 6360000002 HC RX W HCPCS: Performed by: INTERNAL MEDICINE

## 2022-08-17 PROCEDURE — 6370000000 HC RX 637 (ALT 250 FOR IP): Performed by: REGISTERED NURSE

## 2022-08-17 PROCEDURE — 6370000000 HC RX 637 (ALT 250 FOR IP): Performed by: INTERNAL MEDICINE

## 2022-08-17 PROCEDURE — 87635 SARS-COV-2 COVID-19 AMP PRB: CPT

## 2022-08-17 RX ORDER — LEVETIRACETAM 500 MG/1
1000 TABLET ORAL NIGHTLY
Status: DISCONTINUED | OUTPATIENT
Start: 2022-08-17 | End: 2022-08-17 | Stop reason: HOSPADM

## 2022-08-17 RX ORDER — TACROLIMUS 1 MG/1
3 CAPSULE ORAL DAILY
Qty: 60 CAPSULE | Refills: 3 | DISCHARGE
Start: 2022-08-18 | End: 2022-08-19

## 2022-08-17 RX ORDER — PREDNISONE 20 MG/1
20 TABLET ORAL 2 TIMES DAILY WITH MEALS
Qty: 1 TABLET | Refills: 0 | DISCHARGE
Start: 2022-08-17 | End: 2022-08-18

## 2022-08-17 RX ORDER — LEVETIRACETAM 500 MG/1
1500 TABLET ORAL DAILY
Status: DISCONTINUED | OUTPATIENT
Start: 2022-08-17 | End: 2022-08-17 | Stop reason: HOSPADM

## 2022-08-17 RX ORDER — FOLIC ACID 1 MG/1
1 TABLET ORAL DAILY
Qty: 30 TABLET | Refills: 3 | DISCHARGE
Start: 2022-08-18

## 2022-08-17 RX ORDER — CARVEDILOL 6.25 MG/1
6.25 TABLET ORAL 2 TIMES DAILY WITH MEALS
Qty: 60 TABLET | Refills: 3 | DISCHARGE
Start: 2022-08-17

## 2022-08-17 RX ORDER — LACOSAMIDE 150 MG/1
150 TABLET ORAL EVERY 8 HOURS SCHEDULED
Qty: 60 TABLET | Status: SHIPPED | DISCHARGE
Start: 2022-08-17 | End: 2022-08-19

## 2022-08-17 RX ORDER — PREDNISONE 1 MG/1
5 TABLET ORAL DAILY
Qty: 10 TABLET | Refills: 0 | DISCHARGE
Start: 2022-08-21 | End: 2022-08-19

## 2022-08-17 RX ORDER — PREDNISONE 10 MG/1
10 TABLET ORAL 2 TIMES DAILY WITH MEALS
Qty: 6 TABLET | Refills: 0 | DISCHARGE
Start: 2022-08-18 | End: 2022-08-19

## 2022-08-17 RX ORDER — LEVETIRACETAM 750 MG/1
1500 TABLET ORAL DAILY
Qty: 60 TABLET | Refills: 3 | DISCHARGE
Start: 2022-08-18

## 2022-08-17 RX ORDER — COLCHICINE 0.6 MG/1
0.6 TABLET ORAL DAILY
Qty: 30 TABLET | Refills: 3 | DISCHARGE
Start: 2022-08-18 | End: 2022-08-19

## 2022-08-17 RX ORDER — LEVETIRACETAM 1000 MG/1
1000 TABLET ORAL NIGHTLY
Qty: 60 TABLET | Refills: 3 | DISCHARGE
Start: 2022-08-17

## 2022-08-17 RX ADMIN — AMLODIPINE BESYLATE 10 MG: 10 TABLET ORAL at 08:51

## 2022-08-17 RX ADMIN — FOLIC ACID 1 MG: 1 TABLET ORAL at 08:51

## 2022-08-17 RX ADMIN — Medication 10 ML: at 08:55

## 2022-08-17 RX ADMIN — COLCHICINE 0.6 MG: 0.6 TABLET ORAL at 08:51

## 2022-08-17 RX ADMIN — PREDNISONE 20 MG: 20 TABLET ORAL at 08:52

## 2022-08-17 RX ADMIN — HEPARIN SODIUM 5000 UNITS: 10000 INJECTION INTRAVENOUS; SUBCUTANEOUS at 08:49

## 2022-08-17 RX ADMIN — LEVETIRACETAM 1500 MG: 500 TABLET, FILM COATED ORAL at 08:52

## 2022-08-17 RX ADMIN — CARVEDILOL 6.25 MG: 6.25 TABLET, FILM COATED ORAL at 08:52

## 2022-08-17 RX ADMIN — TACROLIMUS 3 MG: 1 CAPSULE ORAL at 08:52

## 2022-08-17 RX ADMIN — LACOSAMIDE 150 MG: 100 TABLET, FILM COATED ORAL at 05:36

## 2022-08-17 RX ADMIN — LACOSAMIDE 150 MG: 100 TABLET, FILM COATED ORAL at 14:33

## 2022-08-17 RX ADMIN — PRIMIDONE 150 MG: 50 TABLET ORAL at 08:51

## 2022-08-17 RX ADMIN — FAMOTIDINE 10 MG: 20 TABLET ORAL at 08:51

## 2022-08-17 ASSESSMENT — PAIN SCALES - GENERAL: PAINLEVEL_OUTOF10: 0

## 2022-08-17 NOTE — PROGRESS NOTES
Subjective: The patient is awake and alert. No problems overnight. Denies chest pain, angina, and dyspnea. Denies abdominal pain. Tolerating diet. No nausea or vomiting. Objective:    /80   Pulse 75   Temp 98.2 °F (36.8 °C) (Oral)   Resp 14   Ht 5' (1.524 m)   Wt 101 lb 1.6 oz (45.9 kg)   SpO2 100%   BMI 19.74 kg/m²     Heart:  RRR, no murmurs, gallops, or rubs. Lungs:  CTA bilaterally with dry fibrotic bases, no wheeze, rales or rhonchi  Abd: bowel sounds present, nontender, nondistended, no masses  Extrem:  No clubbing, cyanosis, or edema  Neuro:  CNs, Sensory, Cerebellar intact    CBC:   Lab Results   Component Value Date/Time    WBC 4.6 08/16/2022 04:24 AM    RBC 2.56 08/16/2022 04:24 AM    HGB 8.4 08/16/2022 04:24 AM    HCT 26.3 08/16/2022 04:24 AM    .7 08/16/2022 04:24 AM    MCH 32.8 08/16/2022 04:24 AM    MCHC 31.9 08/16/2022 04:24 AM    RDW 12.7 08/16/2022 04:24 AM     08/16/2022 04:24 AM    MPV 10.4 08/16/2022 04:24 AM     BMP:    Lab Results   Component Value Date/Time     08/16/2022 04:24 AM    K 3.7 08/16/2022 04:24 AM    K 4.3 07/31/2022 10:41 AM     08/16/2022 04:24 AM    CO2 23 08/16/2022 04:24 AM    BUN 16 08/16/2022 04:24 AM    LABALBU 2.6 08/11/2022 03:03 PM    CREATININE 0.6 08/16/2022 04:24 AM    CALCIUM 8.7 08/16/2022 04:24 AM    GFRAA >60 08/16/2022 04:24 AM    LABGLOM >60 08/16/2022 04:24 AM    GLUCOSE 104 08/16/2022 04:24 AM    GLUCOSE 85 03/25/2012 06:25 AM     Ionized Calcium:  No results found for: IONCA     Assessment:  Keppra level acknowledged. Will decrease slightly acknowledging her brittleness for seizure breakthrough.     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 [facility])    Acute on chronic kidney failure [facility])    Encounter regarding vascular access for dialysis for ESRD (HCC)    Volume overload    Essential hypertension    Shortness of breath    Immunosuppressed status (HCC)    Acute on chronic respiratory failure with hypoxia (HCC)    Acute on chronic diastolic congestive heart failure (HCC)    Generalized seizure disorder (HCC)    Iron deficiency anemia    ESRD (end stage renal disease) on dialysis (HCC)    AVF (arteriovenous fistula) (HCC)    Hospital-acquired pneumonia    Pneumonia    Anemia    Inferior pubic ramus fracture, left, closed, initial encounter ([facility])    Nausea & vomiting    Tear of medial collateral ligament of left knee    TRACI (acute kidney injury) ([facility] [address]    Moderate protein-calorie malnutrition (HCC)    Folic acid deficiency       Plan:  Ready for DC.           Jorge Eric MD  7:08 AM  8/17/2022

## 2022-08-17 NOTE — PROGRESS NOTES
JACKELYN/ALXEIA with Dr. Charles Tolbert office requesting discharge order. Awaiting return call.     Electronically signed by Marline Quinones RN on 8/17/2022 at 9:10 AM

## 2022-08-17 NOTE — PROGRESS NOTES
Comprehensive Nutrition Assessment    Type and Reason for Visit:  Reassess    Nutrition Recommendations/Plan:    Pt on full liquid diet >5 days. Please consider advancing diet as tolerated prior to discharge- recommend  2 gm Na+ diet. If unable to advance diet please consider enteral or parenteral nutrition &  consult the dietitian. Continue ONS BID  Monitor while inpatient. Malnutrition Assessment:  Malnutrition Status: Moderate malnutrition (08/04/22 1345)    Context:  Chronic Illness     Findings of the 6 clinical characteristics of malnutrition:  Energy Intake:  75% or less estimated energy requirements for 1 month or longer  Weight Loss:  Unable to assess (d/t hx CHF)     Body Fat Loss:  Mild body fat loss Triceps, Orbital, Buccal region   Muscle Mass Loss:   (moderate) Temples (temporalis), Clavicles (pectoralis & deltoids), Scapula (trapezius)  Fluid Accumulation:  No significant fluid accumulation     Strength:  Not Performed    Nutrition Assessment:    ADM: Fever probably secondary to her gouty arthritis per ID. HX: CHF, CKD, non-traumatic cerebral hemmorhage, hemiparesis, Status post right lung transplant. Pt remains at nutritional risk 2/2 moderate malnutrition, underweight & being on full liquid diet since admission. Recommend to ADAT to 2 gm Na+ diet-if not able- consider enteral or parenteral nutrition. , PO meal intake appears <50%, taking most magic cup per observation. Will continue ONS BID(does not tolerate ensure -diarrhea) Will continue to monitor. Nutrition Related Findings:    A&O, R AVF (not using), pt says she is tired of  liquids & would like diet advanced. +1/+2/tr edema, + BS, flatus, weakness. Denies problems chewing/swallowing/diarrhea/constipation.  Wound Type: None (redness)       Current Nutrition Intake & Therapies:    Average Meal Intake: 26-50% (Pt says she would eat more if she got something different than fluids.)  Average Supplements Intake: %  ADULT DIET; Full Liquid  ADULT ORAL NUTRITION SUPPLEMENT; Lunch, Dinner; Frozen Oral Supplement    Anthropometric Measures:  Height: 5' (152.4 cm)  Ideal Body Weight (IBW): 100 lbs (45 kg)    Admission Body Weight: 104 lb 12.8 oz (47.5 kg) (8/4 bed)  Current Body Weight: 106 lb 8 oz (48.3 kg), 106.5 % IBW. Weight Source: Bed Scale (8/17)  Current BMI (kg/m2): 20.8  Usual Body Weight: 113 lb (51.3 kg) (10/2021 actual per EMR OV, 110# X 3 months OV)  % Weight Change (Calculated): -7.3  Weight Adjustment For: No Adjustment                 BMI Categories: Underweight (BMI less than 22) age over 72    Estimated Daily Nutrient Needs:  Energy Requirements Based On: Kcal/kg  Weight Used for Energy Requirements: Current  Energy (kcal/day):   Weight Used for Protein Requirements: Current  Protein (g/day): 55-65  Method Used for Fluid Requirements: 1 ml/kcal  Fluid (ml/day): 6138-2614    Nutrition Diagnosis:   Moderate malnutrition, In context of chronic illness related to catabolic illness as evidenced by Criteria as identified in malnutrition assessment    Nutrition Interventions:   Food and/or Nutrient Delivery: Modify Current Diet, Continue Oral Nutrition Supplement (to ADAT- 2 gm Na+, magic cup BID)  Nutrition Education/Counseling: Education not indicated  Coordination of Nutrition Care: Continue to monitor while inpatient       Goals:  Previous Goal Met: Progressing toward Goal(s)  Goals: Meet at least 75% of estimated needs, within 7 days       Nutrition Monitoring and Evaluation:      Food/Nutrient Intake Outcomes: Diet Advancement/Tolerance, Food and Nutrient Intake, Supplement Intake  Physical Signs/Symptoms Outcomes: Diarrhea, Nausea or Vomiting, Weight, Skin, Nutrition Focused Physical Findings, Fluid Status or Edema    Discharge Planning:    Continue Oral Nutrition Supplement     ANISH Willard  Contact: [phone]

## 2022-08-17 NOTE — CARE COORDINATION
Social work / Discharge Planning:         Precert approved for [employer]. Awaiting negative covid result. [facility] will  at 12:30pm  for transport.   Social work updated RN, facility liaison and patient's  at bedside.,   Electronically signed by SANDRA Osman on 8/17/2022 at 9:38 AM

## 2022-08-17 NOTE — PROGRESS NOTES
Second call placed to Dr. Tim Westfall office requesting discharge order and medication reconciliation.     Electronically signed by Ana María Ward RN on 8/17/2022 at 10:07 AM

## 2022-08-18 LAB
ALBUMIN SERPL-MCNC: 3 G/DL (ref 3.5–5.2)
ALP BLD-CCNC: 93 U/L (ref 35–104)
ALT SERPL-CCNC: 23 U/L (ref 0–32)
ANION GAP SERPL CALCULATED.3IONS-SCNC: 13 MMOL/L (ref 7–16)
AST SERPL-CCNC: 22 U/L (ref 0–31)
BILIRUB SERPL-MCNC: 0.4 MG/DL (ref 0–1.2)
BUN BLDV-MCNC: 12 MG/DL (ref 6–23)
CALCIUM SERPL-MCNC: 8.7 MG/DL (ref 8.6–10.2)
CHLORIDE BLD-SCNC: 99 MMOL/L (ref 98–107)
CO2: 26 MMOL/L (ref 22–29)
CREAT SERPL-MCNC: 0.6 MG/DL (ref 0.5–1)
GFR AFRICAN AMERICAN: >60
GFR NON-AFRICAN AMERICAN: >60 ML/MIN/1.73
GLUCOSE BLD-MCNC: 85 MG/DL (ref 74–99)
HCT VFR BLD CALC: 26.1 % (ref 34–48)
HEMOGLOBIN: 8.4 G/DL (ref 11.5–15.5)
MCH RBC QN AUTO: 33.1 PG (ref 26–35)
MCHC RBC AUTO-ENTMCNC: 32.2 % (ref 32–34.5)
MCV RBC AUTO: 102.8 FL (ref 80–99.9)
PDW BLD-RTO: 12.9 FL (ref 11.5–15)
PLATELET # BLD: 341 E9/L (ref 130–450)
PMV BLD AUTO: 10.1 FL (ref 7–12)
POTASSIUM SERPL-SCNC: 3.8 MMOL/L (ref 3.5–5)
RBC # BLD: 2.54 E12/L (ref 3.5–5.5)
SODIUM BLD-SCNC: 138 MMOL/L (ref 132–146)
TOTAL PROTEIN: 6 G/DL (ref 6.4–8.3)
VITAMIN D 25-HYDROXY: 30 NG/ML (ref 30–100)
WBC # BLD: 8.6 E9/L (ref 4.5–11.5)

## 2022-08-19 ENCOUNTER — APPOINTMENT (OUTPATIENT)
Dept: GENERAL RADIOLOGY | Age: 67
DRG: 374 | End: 2022-08-19
Payer: COMMERCIAL

## 2022-08-19 ENCOUNTER — HOSPITAL ENCOUNTER (INPATIENT)
Age: 67
LOS: 8 days | Discharge: ANOTHER HOSPITAL | DRG: 374 | End: 2022-08-30
Attending: EMERGENCY MEDICINE | Admitting: INTERNAL MEDICINE
Payer: COMMERCIAL

## 2022-08-19 DIAGNOSIS — R13.10 DYSPHAGIA, UNSPECIFIED TYPE: Primary | ICD-10-CM

## 2022-08-19 DIAGNOSIS — T17.908A ASPIRATION OF FOREIGN BODY, INITIAL ENCOUNTER: ICD-10-CM

## 2022-08-19 LAB
ALBUMIN SERPL-MCNC: 3.7 G/DL (ref 3.5–5.2)
ALP BLD-CCNC: 104 U/L (ref 35–104)
ALT SERPL-CCNC: 21 U/L (ref 0–32)
ANION GAP SERPL CALCULATED.3IONS-SCNC: 16 MMOL/L (ref 7–16)
AST SERPL-CCNC: 14 U/L (ref 0–31)
BASOPHILS ABSOLUTE: 0.02 E9/L (ref 0–0.2)
BASOPHILS RELATIVE PERCENT: 0.2 % (ref 0–2)
BILIRUB SERPL-MCNC: 0.5 MG/DL (ref 0–1.2)
BUN BLDV-MCNC: 10 MG/DL (ref 6–23)
CALCIUM SERPL-MCNC: 9.2 MG/DL (ref 8.6–10.2)
CHLORIDE BLD-SCNC: 98 MMOL/L (ref 98–107)
CO2: 28 MMOL/L (ref 22–29)
CREAT SERPL-MCNC: 0.6 MG/DL (ref 0.5–1)
EOSINOPHILS ABSOLUTE: 0.07 E9/L (ref 0.05–0.5)
EOSINOPHILS RELATIVE PERCENT: 0.7 % (ref 0–6)
GFR AFRICAN AMERICAN: >60
GFR NON-AFRICAN AMERICAN: >60 ML/MIN/1.73
GLUCOSE BLD-MCNC: 108 MG/DL (ref 74–99)
HCT VFR BLD CALC: 30.3 % (ref 34–48)
HEMOGLOBIN: 9.8 G/DL (ref 11.5–15.5)
IMMATURE GRANULOCYTES #: 0.07 E9/L
IMMATURE GRANULOCYTES %: 0.7 % (ref 0–5)
KEPPRA: 43 UG/ML (ref 10–40)
LYMPHOCYTES ABSOLUTE: 1 E9/L (ref 1.5–4)
LYMPHOCYTES RELATIVE PERCENT: 9.8 % (ref 20–42)
MCH RBC QN AUTO: 33.6 PG (ref 26–35)
MCHC RBC AUTO-ENTMCNC: 32.3 % (ref 32–34.5)
MCV RBC AUTO: 103.8 FL (ref 80–99.9)
MONOCYTES ABSOLUTE: 0.69 E9/L (ref 0.1–0.95)
MONOCYTES RELATIVE PERCENT: 6.8 % (ref 2–12)
NEUTROPHILS ABSOLUTE: 8.36 E9/L (ref 1.8–7.3)
NEUTROPHILS RELATIVE PERCENT: 81.8 % (ref 43–80)
PDW BLD-RTO: 13.2 FL (ref 11.5–15)
PLATELET # BLD: 363 E9/L (ref 130–450)
PMV BLD AUTO: 10.1 FL (ref 7–12)
POTASSIUM REFLEX MAGNESIUM: 3.8 MMOL/L (ref 3.5–5)
RBC # BLD: 2.92 E12/L (ref 3.5–5.5)
SODIUM BLD-SCNC: 142 MMOL/L (ref 132–146)
TOTAL PROTEIN: 6.7 G/DL (ref 6.4–8.3)
WBC # BLD: 10.2 E9/L (ref 4.5–11.5)

## 2022-08-19 PROCEDURE — 6360000002 HC RX W HCPCS: Performed by: STUDENT IN AN ORGANIZED HEALTH CARE EDUCATION/TRAINING PROGRAM

## 2022-08-19 PROCEDURE — 93005 ELECTROCARDIOGRAM TRACING: CPT | Performed by: EMERGENCY MEDICINE

## 2022-08-19 PROCEDURE — 99285 EMERGENCY DEPT VISIT HI MDM: CPT

## 2022-08-19 PROCEDURE — G0378 HOSPITAL OBSERVATION PER HR: HCPCS

## 2022-08-19 PROCEDURE — 71045 X-RAY EXAM CHEST 1 VIEW: CPT

## 2022-08-19 PROCEDURE — 2580000003 HC RX 258: Performed by: NURSE PRACTITIONER

## 2022-08-19 PROCEDURE — 96365 THER/PROPH/DIAG IV INF INIT: CPT

## 2022-08-19 PROCEDURE — 85025 COMPLETE CBC W/AUTO DIFF WBC: CPT

## 2022-08-19 PROCEDURE — 2580000003 HC RX 258: Performed by: EMERGENCY MEDICINE

## 2022-08-19 PROCEDURE — 80053 COMPREHEN METABOLIC PANEL: CPT

## 2022-08-19 RX ORDER — POLYETHYLENE GLYCOL 3350 17 G/17G
17 POWDER, FOR SOLUTION ORAL DAILY PRN
Status: DISCONTINUED | OUTPATIENT
Start: 2022-08-19 | End: 2022-08-30 | Stop reason: HOSPADM

## 2022-08-19 RX ORDER — TACROLIMUS 1 MG/1
2 CAPSULE ORAL NIGHTLY
COMMUNITY

## 2022-08-19 RX ORDER — ACETAMINOPHEN 650 MG/1
650 SUPPOSITORY RECTAL EVERY 6 HOURS PRN
Status: DISCONTINUED | OUTPATIENT
Start: 2022-08-19 | End: 2022-08-30 | Stop reason: HOSPADM

## 2022-08-19 RX ORDER — ENOXAPARIN SODIUM 100 MG/ML
30 INJECTION SUBCUTANEOUS DAILY
Status: DISCONTINUED | OUTPATIENT
Start: 2022-08-19 | End: 2022-08-23

## 2022-08-19 RX ORDER — ZINC SULFATE 50(220)MG
50 CAPSULE ORAL DAILY
COMMUNITY

## 2022-08-19 RX ORDER — PREDNISONE 1 MG/1
10 TABLET ORAL 2 TIMES DAILY
COMMUNITY
Start: 2022-08-18 | End: 2022-08-20

## 2022-08-19 RX ORDER — SODIUM CHLORIDE 0.9 % (FLUSH) 0.9 %
5-40 SYRINGE (ML) INJECTION EVERY 12 HOURS SCHEDULED
Status: DISCONTINUED | OUTPATIENT
Start: 2022-08-19 | End: 2022-08-25 | Stop reason: SDUPTHER

## 2022-08-19 RX ORDER — METOPROLOL TARTRATE 5 MG/5ML
2.5 INJECTION INTRAVENOUS EVERY 8 HOURS
Status: DISCONTINUED | OUTPATIENT
Start: 2022-08-19 | End: 2022-08-30 | Stop reason: HOSPADM

## 2022-08-19 RX ORDER — LEVETIRACETAM 15 MG/ML
1500 INJECTION INTRAVASCULAR DAILY
Status: DISCONTINUED | OUTPATIENT
Start: 2022-08-20 | End: 2022-08-23 | Stop reason: SDUPTHER

## 2022-08-19 RX ORDER — ACETAMINOPHEN 325 MG/1
650 TABLET ORAL EVERY 6 HOURS PRN
Status: DISCONTINUED | OUTPATIENT
Start: 2022-08-19 | End: 2022-08-30 | Stop reason: HOSPADM

## 2022-08-19 RX ORDER — LEVETIRACETAM 10 MG/ML
1000 INJECTION INTRAVASCULAR ONCE
Status: COMPLETED | OUTPATIENT
Start: 2022-08-19 | End: 2022-08-19

## 2022-08-19 RX ORDER — SODIUM CHLORIDE 9 MG/ML
INJECTION, SOLUTION INTRAVENOUS CONTINUOUS
Status: DISCONTINUED | OUTPATIENT
Start: 2022-08-19 | End: 2022-08-20

## 2022-08-19 RX ORDER — TACROLIMUS 1 MG/1
3 CAPSULE ORAL DAILY
Status: DISCONTINUED | OUTPATIENT
Start: 2022-08-19 | End: 2022-08-20 | Stop reason: DRUGHIGH

## 2022-08-19 RX ORDER — PRIMIDONE 50 MG/1
150 TABLET ORAL 2 TIMES DAILY
Status: DISCONTINUED | OUTPATIENT
Start: 2022-08-19 | End: 2022-08-30 | Stop reason: HOSPADM

## 2022-08-19 RX ORDER — COLCHICINE 0.6 MG/1
0.6 TABLET ORAL DAILY
COMMUNITY

## 2022-08-19 RX ORDER — LEVETIRACETAM 10 MG/ML
1000 INJECTION INTRAVASCULAR NIGHTLY
Status: DISCONTINUED | OUTPATIENT
Start: 2022-08-20 | End: 2022-08-23 | Stop reason: SDUPTHER

## 2022-08-19 RX ORDER — SODIUM CHLORIDE 0.9 % (FLUSH) 0.9 %
10 SYRINGE (ML) INJECTION PRN
Status: DISCONTINUED | OUTPATIENT
Start: 2022-08-19 | End: 2022-08-25 | Stop reason: SDUPTHER

## 2022-08-19 RX ORDER — AMLODIPINE BESYLATE 10 MG/1
10 TABLET ORAL EVERY MORNING
Status: DISCONTINUED | OUTPATIENT
Start: 2022-08-20 | End: 2022-08-30 | Stop reason: HOSPADM

## 2022-08-19 RX ORDER — ONDANSETRON 2 MG/ML
4 INJECTION INTRAMUSCULAR; INTRAVENOUS EVERY 6 HOURS PRN
Status: DISCONTINUED | OUTPATIENT
Start: 2022-08-19 | End: 2022-08-30 | Stop reason: HOSPADM

## 2022-08-19 RX ORDER — ONDANSETRON 4 MG/1
4 TABLET, FILM COATED ORAL EVERY 8 HOURS PRN
COMMUNITY

## 2022-08-19 RX ORDER — FAMOTIDINE 20 MG/1
20 TABLET, FILM COATED ORAL DAILY
COMMUNITY

## 2022-08-19 RX ORDER — ASCORBIC ACID 500 MG
500 TABLET ORAL 3 TIMES DAILY
COMMUNITY

## 2022-08-19 RX ORDER — LACOSAMIDE 150 MG/1
150 TABLET ORAL 3 TIMES DAILY
COMMUNITY
Start: 2022-08-18 | End: 2022-09-17

## 2022-08-19 RX ORDER — SODIUM CHLORIDE 9 MG/ML
INJECTION, SOLUTION INTRAVENOUS PRN
Status: DISCONTINUED | OUTPATIENT
Start: 2022-08-19 | End: 2022-08-30 | Stop reason: HOSPADM

## 2022-08-19 RX ORDER — ONDANSETRON 4 MG/1
4 TABLET, ORALLY DISINTEGRATING ORAL EVERY 8 HOURS PRN
Status: DISCONTINUED | OUTPATIENT
Start: 2022-08-19 | End: 2022-08-30 | Stop reason: HOSPADM

## 2022-08-19 RX ADMIN — Medication 10 ML: at 22:30

## 2022-08-19 RX ADMIN — LEVETIRACETAM 1000 MG: 10 INJECTION INTRAVENOUS at 16:27

## 2022-08-19 RX ADMIN — SODIUM CHLORIDE: 9 INJECTION, SOLUTION INTRAVENOUS at 17:31

## 2022-08-19 ASSESSMENT — PAIN SCALES - GENERAL: PAINLEVEL_OUTOF10: 0

## 2022-08-19 NOTE — ED PROVIDER NOTES
Patient is a 72-year-old female who presents to the emergency department from skilled nursing facility for reported dysphagia and vomiting. Patient was recently discharged from hospital 2 days ago and presented to a skilled nursing facility after treatment for TRACI. At the skilled nursing facility patient has had vomiting and has been dysphagia of fluids. Patient states that she was able to tolerate small bites of scrambled eggs yesterday however today was unable to take her medications and solid foods as she vomited all. She states she had difficulty swallowing. Patient states she was able to tolerate thin liquids. This was reiterated by the nursing home. Nursing home also stated patient has \"malnutrition\" as she has not been able to take any good p.o. intake. Patient denies any chest pain or shortness of breath, abdominal pain, paresthesias or numbness. Patient was sent in for evaluation after SLP at the nursing home recommended video swallow and possible PEG tube. Review of Systems   Constitutional:  Negative for chills and fever. HENT:  Positive for trouble swallowing. Negative for congestion, drooling, rhinorrhea, sore throat and voice change. Eyes:  Negative for visual disturbance. Respiratory:  Negative for cough and shortness of breath. Cardiovascular:  Negative for chest pain. Gastrointestinal:  Positive for vomiting. Negative for abdominal pain, diarrhea and nausea. Endocrine: Negative for polyuria. Genitourinary:  Negative for dysuria and urgency. Musculoskeletal:  Negative for arthralgias and myalgias. Skin:  Negative for rash and wound. Allergic/Immunologic: Negative for immunocompromised state. Neurological:  Negative for dizziness, syncope, weakness, light-headedness, numbness and headaches. Psychiatric/Behavioral:  Negative for confusion. The patient is not nervous/anxious. Physical Exam  Vitals and nursing note reviewed.    Constitutional:       General: She is awake. She is not in acute distress. Appearance: She is underweight. She is not ill-appearing. HENT:      Head: Normocephalic and atraumatic. Mouth/Throat:      Mouth: Mucous membranes are moist.      Pharynx: Oropharynx is clear. Eyes:      Pupils: Pupils are equal, round, and reactive to light. Cardiovascular:      Rate and Rhythm: Normal rate and regular rhythm. Pulses: Normal pulses. Radial pulses are 2+ on the right side and 2+ on the left side. Heart sounds: Normal heart sounds. Pulmonary:      Effort: Pulmonary effort is normal.      Breath sounds: Normal breath sounds. Abdominal:      Palpations: Abdomen is soft. Tenderness: There is no abdominal tenderness. Musculoskeletal:         General: Normal range of motion. Cervical back: Normal range of motion and neck supple. Skin:     General: Skin is warm and dry. Capillary Refill: Capillary refill takes less than 2 seconds. Neurological:      General: No focal deficit present. Mental Status: She is alert and oriented to person, place, and time. Psychiatric:         Behavior: Behavior is cooperative. MDM  Number of Diagnoses or Management Options  Dysphagia, unspecified type  Diagnosis management comments: Patient is a 51-year-old female presents from skilled nursing facility for difficulty swallowing. Patient has had difficulty swallowing solids, but did swallow scrambled eggs yesterday, today was having vomiting with swallowing. Patient was recently admitted and per EMR patient was on a full liquid diet. Patient was seen by SLP today and recommended further swallow evaluation. Patient presents awake, alert, speaking full sentences, no respiratory distress, no drooling. Vital signs were noted. Physical exam shows no significant findings. EMR was reviewed. Swallow screen was performed in the emergency department patient passed, did not have nausea or vomiting.   Decision was made to admit for observation and further evaluation. DEVAUGHN was consulted, accepted the patient. Patient was monitored in the emergency department that further change. Work-up in the emergency department was reviewed. Patient was admitted in stable condition. Amount and/or Complexity of Data Reviewed  Clinical lab tests: ordered and reviewed  Tests in the radiology section of CPT®: ordered and reviewed       EKG: This EKG is signed and interpreted by me. Rate: 93  Rhythm: Sinus  Interpretation: Normal sinus rhythm, normal [place] interval, normal QRS, normal QT interval, no acute ST or T wave changes  Comparison: stable as compared to patient's most recent EKG     --------------------------------------------- PAST HISTORY ---------------------------------------------  Past Medical History:  has a past medical history of Acute on chronic respiratory failure with hypoxia ([address] Anemia, Cardiomegaly, CHF (congestive heart failure) ([address] Chronic kidney disease, Constipation, Diaphragmatic hernia, Diverticulitis, Epilepsy ([address] Falls, GERD (gastroesophageal reflux disease), Hemiparesis ([address] Hemiplegia ([address] Hemodialysis patient ([address] History of blood transfusion, History of lung transplant ([address] Hyperparathyroidism ([address] Hypertension, Immunosuppressed status ([address] Insomnia, IPF (idiopathic pulmonary fibrosis) ([address] Kidney stone, Neutropenia ([address] 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 Past Surgical History:  has a past surgical history that includes Lung transplant (2003); hip surgery; knee surgery; hernia repair; Cholecystectomy; Fixation Kyphoplasty (11/3/2015); Leg Surgery (Left, 12/06/2016); Dialysis fistula creation (Right, 04/27/2017); back surgery; AV fistula repair (Right, 08/17/2017);  Colonoscopy; other surgical history (11/14/2017); fracture surgery (Left, 12/2014); joint replacement (Bilateral); joint replacement (Left); and pr anastomosis,av,any site (Right, 9/28/2018). Social History:  reports that she has never smoked. She has never used smokeless tobacco. She reports that she does not drink alcohol and does not use drugs. Family History: Family history is unknown by patient. The patients home medications have been reviewed.     Allergies: Ambien [zolpidem tartrate], Klonopin [clonazepam], Lorazepam, and Eszopiclone    -------------------------------------------------- RESULTS -------------------------------------------------    LABS:  Results for orders placed or performed during the hospital encounter of 08/19/22   CBC with Auto Differential   Result Value Ref Range    WBC 10.2 4.5 - 11.5 E9/L    RBC 2.92 (L) 3.50 - 5.50 E12/L    Hemoglobin 9.8 (L) 11.5 - 15.5 g/dL    Hematocrit 30.3 (L) 34.0 - 48.0 %    .8 (H) 80.0 - 99.9 fL    MCH 33.6 26.0 - 35.0 pg    MCHC 32.3 32.0 - 34.5 %    RDW 13.2 11.5 - 15.0 fL    Platelets 239 060 - 097 E9/L    MPV 10.1 7.0 - 12.0 fL    Neutrophils % 81.8 (H) 43.0 - 80.0 %    Immature Granulocytes % 0.7 0.0 - 5.0 %    Lymphocytes % 9.8 (L) 20.0 - 42.0 %    Monocytes % 6.8 2.0 - 12.0 %    Eosinophils % 0.7 0.0 - 6.0 %    Basophils % 0.2 0.0 - 2.0 %    Neutrophils Absolute 8.36 (H) 1.80 - 7.30 E9/L    Immature Granulocytes # 0.07 E9/L    Lymphocytes Absolute 1.00 (L) 1.50 - 4.00 E9/L    Monocytes Absolute 0.69 0.10 - 0.95 E9/L    Eosinophils Absolute 0.07 0.05 - 0.50 E9/L    Basophils Absolute 0.02 0.00 - 0.20 E9/L   Comprehensive Metabolic Panel w/ Reflex to MG   Result Value Ref Range    Sodium 142 132 - 146 mmol/L    Potassium reflex Magnesium 3.8 3.5 - 5.0 mmol/L    Chloride 98 98 - 107 mmol/L    CO2 28 22 - 29 mmol/L    Anion Gap 16 7 - 16 mmol/L    Glucose 108 (H) 74 - 99 mg/dL    BUN 10 6 - 23 mg/dL    Creatinine 0.6 0.5 - 1.0 mg/dL    GFR Non-African American >60 >=60 mL/min/1.73    GFR African American >60     Calcium 9.2 8.6 - 10.2 mg/dL    Total Protein 6.7 6.4 - 8.3 g/dL    Albumin 3.7 3.5 - 5.2 g/dL    Total Bilirubin 0.5 0.0 - 1.2 mg/dL    Alkaline Phosphatase 104 35 - 104 U/L    ALT 21 0 - 32 U/L    AST 14 0 - 31 U/L       RADIOLOGY:  XR CHEST PORTABLE   Final Result   Suspect retrocardiac infiltrates, significantly improved since prior study. FL MODIFIED BARIUM SWALLOW W VIDEO    (Results Pending)     ------------------------- NURSING NOTES AND VITALS REVIEWED ---------------------------  Date / Time Roomed:  8/19/2022  2:39 PM  ED Bed Assignment:  [id number]    The nursing notes within the ED encounter and vital signs as below have been reviewed. Patient Vitals for the past 24 hrs:   BP Temp Temp src Pulse Resp SpO2 Height Weight   08/19/22 1845 -- -- -- -- -- -- -- 115 lb (52.2 kg)   08/19/22 1820 (!) 142/83 98.1 °F (36.7 °C) Oral 84 18 100 % -- --   08/19/22 1443 133/67 98.2 °F (36.8 °C) Oral 90 16 96 % 5' (1.524 m) 106 lb (48.1 kg)       Oxygen Saturation Interpretation: Normal    ------------------------------------------ PROGRESS NOTES ------------------------------------------  Re-evaluation(s):  Patients symptoms show no change  Repeat physical examination is not changed    Counseling:  I have spoken with the patient and discussed todays results, in addition to providing specific details for the plan of care and counseling regarding the diagnosis and prognosis. Their questions are answered at this time and they are agreeable with the plan of admission.    --------------------------------- ADDITIONAL PROVIDER NOTES ---------------------------------  Consultations:  Spoke with [facility] for [facility]. Discussed case. They will admit the patient. This patient's ED course included: a personal history and physicial examination and re-evaluation prior to disposition    This patient has remained hemodynamically stable during their ED course. Diagnosis:  1.  Dysphagia, unspecified type      Disposition:  Patient's disposition: Admit to med/surg floor  Patient's condition is stable. 8/19/22, 9:15 PM EDT.     This note is prepared by Aspen Dan MD -PGY- 3             Aspen Dan MD  Resident  08/20/22 0934

## 2022-08-19 NOTE — LETTER
[place] Department Medicaid  CERTIFICATION OF NECESSITY  FOR NON-EMERGENCY TRANSPORTATION   BY GROUND AMBULANCE      Individual Information   1. Name: Chago Christie 2. [place] Medicaid Billing Number:    3. Address: [address] [place] [address]      Transportation Provider Information   4. Provider Name:    5. [place] Provider Number:  National Provider Identifier (NPI):      Certification  7. Criteria:  During transport, this individual requires:  [x] Medical treatment or continuous     supervision by an EMT. [] The administration or regulation of oxygen by another person. [] Supervised protective restraint. 8. Period Beginning Date:    5. Length  [x] Not more than 5 day(s)  [] One Year     Additional Information Relevant to Certification   10. Comments or Explanations, If Necessary or Appropriate   Seizure disorder, history of lung and kidney transplant, gait dysfunction, poor balance, strength and endurance     Certifying Practitioner Information   11. Name of Practitioner: Dr. Gurdeep Sheehan   12. [place] Medicaid Provider Number, If Applicable:  [address] Provider Identifier (NPI):      Signature Information   14. Date of Signature: 22 15. Name of [facility]   16. Signature and Professional Designation: Electronically signed by SANDRA Reyes on 2022 at 1:33 PM       [facility] [address]  Rev. 2015          [address] Encounter Date/Time: 2022 [address] [place] [address],[address] Account: [de-identified]    MRN: [id number]    Patient: Chago Christie    Contact Serial #: [id number]      ENCOUNTER          Patient Class: I Private Enc?   No Unit  [facility] 0243/0634-[id number]   Hospital Service: MED   Encounter DX: Dysphagia [R13.10]   ADM Provider: Gurdeep Sheehan MD   Procedure:     ATT Provider: Gurdeep Sheehan MD   REF Provider:        Admission DX: Dysphagia, Dysphagia, unspecified type and DX codes: R13.10, R13.10      PATIENT                 Name: Chago Christie : 1955 (67 yrs)   Address: [address] [place] [address] [address] Sex: Female   [facility]o [facility] [address]         Marital Status:    Employer: RETIRED         Temple: [employer] [facility])   Primary Care Provider: Tanisha Gallardo MD         Primary Phone: [phone]   EMERGENCY CONTACT   Contact Name Legal Guardian? Relationship to Patient Home Phone Work Phone   1. Moises Linn  2. *No Contact Specified*      Spouse    [phone]                 GUARANTOR            Guarantor: Marvel Silva     : 1955   Address: [address] [place] Sex: Female   [place] [address]     Relation to Patient: Self       Home Phone: [phone]   Guarantor ID: [id number]       Work Phone:     Guarantor Employer: RETIRED         Status: RETIRED      COVERAGE        PRIMARY INSURANCE   Payor: [address] Address: [place], [address]       Group Number: [id number] Type: INDEMNITY   Subscriber Name: Juan Daniel Paul : 1955   Subscriber ID: [id number] Pat. Rel. to Sub: Self   SECONDARY INSURANCE   Payor:   Plan:     Payor Address:  ,           Group Number:   Insurance Type:     Subscriber Name:   Subscriber :     Subscriber ID:   Pat.  Rel. to Sub:

## 2022-08-20 ENCOUNTER — APPOINTMENT (OUTPATIENT)
Dept: GENERAL RADIOLOGY | Age: 67
DRG: 374 | End: 2022-08-20
Payer: COMMERCIAL

## 2022-08-20 LAB
ANION GAP SERPL CALCULATED.3IONS-SCNC: 15 MMOL/L (ref 7–16)
BASOPHILS ABSOLUTE: 0.03 E9/L (ref 0–0.2)
BASOPHILS RELATIVE PERCENT: 0.4 % (ref 0–2)
BUN BLDV-MCNC: 10 MG/DL (ref 6–23)
CALCIUM SERPL-MCNC: 8.4 MG/DL (ref 8.6–10.2)
CHLORIDE BLD-SCNC: 96 MMOL/L (ref 98–107)
CO2: 26 MMOL/L (ref 22–29)
CREAT SERPL-MCNC: 0.6 MG/DL (ref 0.5–1)
EOSINOPHILS ABSOLUTE: 0.2 E9/L (ref 0.05–0.5)
EOSINOPHILS RELATIVE PERCENT: 2.4 % (ref 0–6)
GFR AFRICAN AMERICAN: >60
GFR NON-AFRICAN AMERICAN: >60 ML/MIN/1.73
GLUCOSE BLD-MCNC: 75 MG/DL (ref 74–99)
HCT VFR BLD CALC: 26.6 % (ref 34–48)
HEMOGLOBIN: 8.6 G/DL (ref 11.5–15.5)
IMMATURE GRANULOCYTES #: 0.08 E9/L
IMMATURE GRANULOCYTES %: 1 % (ref 0–5)
LYMPHOCYTES ABSOLUTE: 1.26 E9/L (ref 1.5–4)
LYMPHOCYTES RELATIVE PERCENT: 15.2 % (ref 20–42)
MAGNESIUM: 1.4 MG/DL (ref 1.6–2.6)
MCH RBC QN AUTO: 33.3 PG (ref 26–35)
MCHC RBC AUTO-ENTMCNC: 32.3 % (ref 32–34.5)
MCV RBC AUTO: 103.1 FL (ref 80–99.9)
MONOCYTES ABSOLUTE: 0.72 E9/L (ref 0.1–0.95)
MONOCYTES RELATIVE PERCENT: 8.7 % (ref 2–12)
NEUTROPHILS ABSOLUTE: 6.02 E9/L (ref 1.8–7.3)
NEUTROPHILS RELATIVE PERCENT: 72.3 % (ref 43–80)
PDW BLD-RTO: 13.2 FL (ref 11.5–15)
PLATELET # BLD: 360 E9/L (ref 130–450)
PMV BLD AUTO: 10.1 FL (ref 7–12)
POTASSIUM REFLEX MAGNESIUM: 3.4 MMOL/L (ref 3.5–5)
RBC # BLD: 2.58 E12/L (ref 3.5–5.5)
SODIUM BLD-SCNC: 137 MMOL/L (ref 132–146)
WBC # BLD: 8.3 E9/L (ref 4.5–11.5)

## 2022-08-20 PROCEDURE — G0378 HOSPITAL OBSERVATION PER HR: HCPCS

## 2022-08-20 PROCEDURE — 96372 THER/PROPH/DIAG INJ SC/IM: CPT

## 2022-08-20 PROCEDURE — 96365 THER/PROPH/DIAG IV INF INIT: CPT

## 2022-08-20 PROCEDURE — C9113 INJ PANTOPRAZOLE SODIUM, VIA: HCPCS | Performed by: NURSE PRACTITIONER

## 2022-08-20 PROCEDURE — 2500000003 HC RX 250 WO HCPCS: Performed by: RADIOLOGY

## 2022-08-20 PROCEDURE — 2580000003 HC RX 258: Performed by: EMERGENCY MEDICINE

## 2022-08-20 PROCEDURE — 6360000002 HC RX W HCPCS: Performed by: PHYSICIAN ASSISTANT

## 2022-08-20 PROCEDURE — 92611 MOTION FLUOROSCOPY/SWALLOW: CPT

## 2022-08-20 PROCEDURE — 74230 X-RAY XM SWLNG FUNCJ C+: CPT

## 2022-08-20 PROCEDURE — 96376 TX/PRO/DX INJ SAME DRUG ADON: CPT

## 2022-08-20 PROCEDURE — 83735 ASSAY OF MAGNESIUM: CPT

## 2022-08-20 PROCEDURE — 96374 THER/PROPH/DIAG INJ IV PUSH: CPT

## 2022-08-20 PROCEDURE — 2500000003 HC RX 250 WO HCPCS: Performed by: NURSE PRACTITIONER

## 2022-08-20 PROCEDURE — 80048 BASIC METABOLIC PNL TOTAL CA: CPT

## 2022-08-20 PROCEDURE — 85025 COMPLETE CBC W/AUTO DIFF WBC: CPT

## 2022-08-20 PROCEDURE — 2580000003 HC RX 258: Performed by: NURSE PRACTITIONER

## 2022-08-20 PROCEDURE — 96375 TX/PRO/DX INJ NEW DRUG ADDON: CPT

## 2022-08-20 PROCEDURE — 36415 COLL VENOUS BLD VENIPUNCTURE: CPT

## 2022-08-20 PROCEDURE — 6360000002 HC RX W HCPCS: Performed by: NURSE PRACTITIONER

## 2022-08-20 PROCEDURE — A4216 STERILE WATER/SALINE, 10 ML: HCPCS | Performed by: NURSE PRACTITIONER

## 2022-08-20 RX ORDER — TACROLIMUS 1 MG/1
2 CAPSULE ORAL NIGHTLY
Status: DISCONTINUED | OUTPATIENT
Start: 2022-08-20 | End: 2022-08-28 | Stop reason: SDUPTHER

## 2022-08-20 RX ORDER — POTASSIUM CHLORIDE 7.45 MG/ML
10 INJECTION INTRAVENOUS PRN
Status: DISCONTINUED | OUTPATIENT
Start: 2022-08-20 | End: 2022-08-30 | Stop reason: HOSPADM

## 2022-08-20 RX ORDER — TACROLIMUS 1 MG/1
3 CAPSULE ORAL 2 TIMES DAILY
Status: DISCONTINUED | OUTPATIENT
Start: 2022-08-20 | End: 2022-08-28 | Stop reason: SDUPTHER

## 2022-08-20 RX ORDER — POTASSIUM CHLORIDE 20 MEQ/1
40 TABLET, EXTENDED RELEASE ORAL PRN
Status: DISCONTINUED | OUTPATIENT
Start: 2022-08-20 | End: 2022-08-30 | Stop reason: HOSPADM

## 2022-08-20 RX ORDER — MAGNESIUM SULFATE IN WATER 40 MG/ML
2000 INJECTION, SOLUTION INTRAVENOUS ONCE
Status: COMPLETED | OUTPATIENT
Start: 2022-08-20 | End: 2022-08-20

## 2022-08-20 RX ADMIN — METOPROLOL TARTRATE 2.5 MG: 1 INJECTION, SOLUTION INTRAVENOUS at 03:52

## 2022-08-20 RX ADMIN — MAGNESIUM SULFATE HEPTAHYDRATE 2000 MG: 40 INJECTION, SOLUTION INTRAVENOUS at 10:52

## 2022-08-20 RX ADMIN — BARIUM SULFATE 15 ML: 0.81 POWDER, FOR SUSPENSION ORAL at 10:25

## 2022-08-20 RX ADMIN — BARIUM SULFATE 15 ML: 400 SUSPENSION ORAL at 10:25

## 2022-08-20 RX ADMIN — BARIUM SULFATE 15 ML: 400 PASTE ORAL at 10:25

## 2022-08-20 RX ADMIN — METOPROLOL TARTRATE 2.5 MG: 1 INJECTION, SOLUTION INTRAVENOUS at 10:51

## 2022-08-20 RX ADMIN — ENOXAPARIN SODIUM 30 MG: 100 INJECTION SUBCUTANEOUS at 10:51

## 2022-08-20 RX ADMIN — SODIUM CHLORIDE: 9 INJECTION, SOLUTION INTRAVENOUS at 03:56

## 2022-08-20 RX ADMIN — LEVETIRACETAM 1000 MG: 10 INJECTION INTRAVENOUS at 20:42

## 2022-08-20 RX ADMIN — Medication 10 ML: at 10:52

## 2022-08-20 RX ADMIN — ONDANSETRON HYDROCHLORIDE 4 MG: 2 SOLUTION INTRAMUSCULAR; INTRAVENOUS at 20:13

## 2022-08-20 RX ADMIN — LEVETIRACETAM 1500 MG: 15 INJECTION INTRAVENOUS at 10:48

## 2022-08-20 RX ADMIN — SODIUM CHLORIDE 40 MG: 9 INJECTION INTRAMUSCULAR; INTRAVENOUS; SUBCUTANEOUS at 10:51

## 2022-08-20 RX ADMIN — Medication 10 ML: at 20:14

## 2022-08-20 RX ADMIN — METOPROLOL TARTRATE 2.5 MG: 1 INJECTION, SOLUTION INTRAVENOUS at 18:17

## 2022-08-20 RX ADMIN — TACROLIMUS 3 MG: 1 CAPSULE ORAL at 12:12

## 2022-08-20 ASSESSMENT — ENCOUNTER SYMPTOMS
SORE THROAT: 0
DIARRHEA: 0
VOICE CHANGE: 0
RHINORRHEA: 0
SHORTNESS OF BREATH: 0
VOMITING: 1
NAUSEA: 0
ABDOMINAL PAIN: 0
TROUBLE SWALLOWING: 1
COUGH: 0

## 2022-08-20 ASSESSMENT — PAIN SCALES - WONG BAKER
WONGBAKER_NUMERICALRESPONSE: 0
WONGBAKER_NUMERICALRESPONSE: 0

## 2022-08-20 NOTE — PLAN OF CARE
Problem: Skin/Tissue Integrity  Goal: Absence of new skin breakdown  Description: 1. Monitor for areas of redness and/or skin breakdown  2. Assess vascular access sites hourly  3. Every 4-6 hours minimum:  Change oxygen saturation probe site  4. Every 4-6 hours:  If on nasal continuous positive airway pressure, respiratory therapy assess nares and determine need for appliance change or resting period.   Outcome: Progressing     Problem: Safety - Adult  Goal: Free from fall injury  Outcome: Progressing  Flowsheets (Taken 8/20/2022 0002)  Free From Fall Injury: Instruct family/caregiver on patient safety     Problem: ABCDS Injury Assessment  Goal: Absence of physical injury  Outcome: Progressing

## 2022-08-20 NOTE — PROGRESS NOTES
SPEECH/LANGUAGE PATHOLOGY  VIDEOFLUOROSCOPIC STUDY OF SWALLOWING (MBS)   and PLAN OF CARE    PATIENT NAME:  Tianna Wilson  (female)     MRN:  [id number]    :  1955  (74 y.o.)  STATUS:  Inpatient: Room 8423/8423-A    TODAY'S DATE:  2022  REFERRING PROVIDER:   ESTRELLA Ferrer CNP   SPECIFIC PROVIDER ORDER: SLP swallowing-dysphagia evaluation and treatment  FL modified barium swallow with video  Date of order:  2022   REASON FOR REFERRAL: Assess swallow fx   EVALUATING THERAPIST: JAVON Kimball      RESULTS:      DYSPHAGIA DIAGNOSIS:  normal swallow function    DIET RECOMMENDATIONS:  Regular consistency solids (IDDSI level 7) with  thin liquids (IDDSI level 0), MEDICATION ADMINISTRATION, and Per patient choice/nursing judgement    FEEDING RECOMMENDATIONS:    Assistance level:  Stand by assistance is needed during all oral intake secondary to vision deficits, Encourage self-feeding     Compensatory strategies recommended: Double swallow, Small bites/sips and Alternate solids and liquids     Discussed recommendations with nursing and/or faxed report to referring provider: Nursing not available, diet change recommendation placed in Physician sticky note section       Laryngeal Penetration and Aspiration:  Penetration WITHOUT aspiration was observed in today's study with  thin liquid 1x    SPEECH THERAPY  PLAN OF CARE   The dysphagia POC is established based on physician order and dysphagia diagnosis    Dysphagia therapy is not recommended       Conditions Requiring Skilled Therapeutic Intervention for dysphagia:    Not applicable    SPECIFIC DYSPHAGIA INTERVENTIONS TO INCLUDE:     not applicable    Specific instructions for next treatment:  not applicable   Treatment Goals:    Short Term Goals:  Not applicable no therapy warranted     Long Term Goals:   Not applicable no therapy warranted      Patient/family Goal:    To be able to [facility] discussed with Patient   The Patient understand(s) the diagnosis, prognosis and plan of care     Rehabilitation Potential/Prognosis: n/a                      ADMITTING DIAGNOSIS: Dysphagia [R13.10]  Dysphagia, unspecified type [R13.10]     VISIT DIAGNOSIS:         PATIENT REPORT/COMPLAINT: difficulty swallowing pills    PRIOR LEVEL OF SWALLOW FUNCTION:    Past History of Dysphagia?:  none reported    Home diet: Not on file  Current Diet Order:  Diet NPO    PROCEDURE:  Consistencies Administered During the Evaluation   Liquids: thin liquid   Solids:  pureed foods and solid foods      Method of Intake:   cup, straw, spoon  Self fed, Fed by clinician, Hand over hand assist      Position:   Seated, upright    INSTRUMENTAL ASSESSMENT:    ORAL PREP/ ORAL PHASE:    The oral stage of swallowing was within functional limits for consistencies administered     PHARYNGEAL PHASE:     ONSET TIME       Onset time of the pharyngeal swallow was adequate       PHARYNGEAL RESIDUALS        Vallecula/Pharyngeal Wall           Mild residuals in the vallecula due to inadequate epiglottic inversion were noted for thin liquid  which partially-mostly cleared  with cued double swallow and liquid chaser       Pyriform Sinuses      No significant residuals were noted in the pyriform sinuses     LARYNGEAL PENETRATION   Laryngeal penetration occurred in the absence of aspiration DURING the swallow for thin liquid due to  inadequate laryngeal closure which cleared from the laryngeal vestibule spontaneously (transient).  Laryngeal penetration was mild and occurred inconsistently   an absent cough/throat clear was noted    ASPIRATION  Aspiration was not present during this evaluation    PENETRATION-ASPIRATION SCALE (PAS):  THIN 2 = Material enters the airway, remains above vocal folds, and is ejected from the airway 1x; other trials material does not enter airway  MILDLY THICK item not administered  MODERATELY THICK item not administered  PUREE 1 = Material does not enter the Shortness of breath    Immunosuppressed status (HCC)    Acute on chronic respiratory failure with hypoxia (HCC)    Acute on chronic diastolic congestive heart failure (HCC)    Generalized seizure disorder (HCC)    Iron deficiency anemia    ESRD (end stage renal disease) on dialysis ([facility]    AVF (arteriovenous fistula) (HCC)    Hospital-acquired pneumonia    Pneumonia    Anemia    Inferior pubic ramus fracture, left, closed, initial encounter (HCC)    Nausea & vomiting    Tear of medial collateral ligament of left knee    TRACI (acute kidney injury) ([facility]    Moderate protein-calorie malnutrition (HCC)    Folic acid deficiency    Dysphagia       Nidhi Roberts.  Angélica ADAMS, Reva Yen. [id number]

## 2022-08-20 NOTE — H&P
[facility]  History and Physical      CHIEF COMPLAINT:    Chief Complaint   Patient presents with    Dysphagia     Sent from NH for difficulty swallowing and malnutrition. Patient of Jabier Villavicencio MD presents with:  Dysphagia    History of Present Illness:   Patient is a 72-year-old female with a past medical history of cardiomegaly, CHF, CKD, epilepsy, GERD, lung and kidney transplant, hypertension, diaphragmatic hernia who presents to the emergency room for dysphagia. Patient was just recently admitted and discharged from [facility] for an acute kidney injury and was transferred to a nursing home for rehab. Patient began having episodes of dysphagia with food resulting in emesis and patient was sent to [facility] to be evaluated. Chest x-ray shows improvement of retrocardiac infiltrates. And lab work overall has been relatively unremarkable. Patient denies any CP, SOB, abdominal pain, fever, chills, N/V/D. Patient states he just feels like her food gets stuck in her throat subsequently causing emesis after. Patient is admitted to [place] [address] for further work-up and treatment. REVIEW OF SYSTEMS:  Pertinent negatives are above in HPI. 10 point ROS otherwise negative.       Past Medical History:   Diagnosis Date    Acute on chronic respiratory failure with hypoxia ([address] 4/7/2017    Anemia     Cardiomegaly     CHF (congestive heart failure) (HCC)     Chronic kidney disease     Constipation     Diaphragmatic hernia     Diverticulitis     Epilepsy ([address]     Falls     GERD (gastroesophageal reflux disease)     Hemiparesis (HCC)     Hemiplegia (HCC)     Hemodialysis patient ([address]     MON - WED- FRI    History of blood transfusion     History of lung transplant ([address]     Hyperparathyroidism ([address]     Hypertension     Immunosuppressed status ([address] 4/7/2017    Insomnia     IPF (idiopathic pulmonary fibrosis) ([address]     Kidney stone     Neutropenia (HCC)     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 LAN    FIXATION KYPHOPLASTY  11/3/2015    kyphoplasty L5    FRACTURE SURGERY Left 12/2014    fracture    HERNIA REPAIR      HIP SURGERY      left    JOINT REPLACEMENT Bilateral     HIP    JOINT REPLACEMENT Left     knee    KNEE SURGERY      left    LEG SURGERY Left 12/06/2016    ORIF left femoral shaft and suprachondylr femur    LUNG TRANSPLANT  2003    [facility]    OTHER SURGICAL HISTORY  11/14/2017    RUE fistula revision    OH ANASTOMOSIS,AV,ANY SITE Right 9/28/2018    AV FISTULA  REVISION -- RIGHT ARM performed by Charlotte Gerardo MD at [address] [place] [address]       Medications Prior to Admission:    Medications Prior to Admission: vitamin C (ASCORBIC ACID) 500 MG tablet, Take 500 mg by mouth 3 times daily  famotidine (PEPCID) 20 MG tablet, Take 20 mg by mouth daily  lacosamide (VIMPAT) 150 MG TABS tablet, Take 150 mg by mouth in the morning, at noon, and at bedtime.   ondansetron (ZOFRAN) 4 MG tablet, Take 4 mg by mouth every 8 hours as needed for Nausea or Vomiting  predniSONE (DELTASONE) 5 MG tablet, Take 10 mg by mouth 2 times daily  tacrolimus (PROGRAF) 1 MG capsule, Take 2 mg by mouth at bedtime  vitamin D (CHOLECALCIFEROL) 25 MCG (1000 UT) TABS tablet, Take 1,000 Units by mouth daily  zinc sulfate (ZINCATE) 220 (50 Zn) MG capsule, Take 50 mg by mouth daily  colchicine (COLCRYS) 0.6 MG tablet, Take 0.6 mg by mouth daily  levETIRAcetam (KEPPRA) 750 MG tablet, Take 2 tablets by mouth daily  levETIRAcetam (KEPPRA) 1000 MG tablet, Take 1 tablet by mouth nightly  carvedilol (COREG) 6.25 MG tablet, Take 1 tablet by mouth 2 times daily (with meals)  folic acid (FOLVITE) 1 MG tablet, Take 1 tablet by mouth daily  acetaminophen (TYLENOL) 325 MG tablet, Take 650 mg by mouth every 4 hours as needed for Pain or Fever  primidone (MYSOLINE) 50 MG tablet, Take 3 tablets by mouth 2 times daily  amLODIPine (NORVASC) 10 MG tablet, Take 1 tablet by mouth daily  tacrolimus (PROGRAF) 1 MG capsule, Take 3 mg by mouth 2 times daily    Note that the patient's home medications were reviewed and the above list is accurate to the best of my knowledge at the time of the exam.    Allergies:    Ambien [zolpidem tartrate], Klonopin [clonazepam], Lorazepam, and Eszopiclone    Social History:    reports that she has never smoked. She has never used smokeless tobacco. She reports that she does not drink alcohol and does not use drugs. Family History:   Family history is unknown by patient. PHYSICAL EXAM:    Vitals:  /81   Pulse 94   Temp 98.2 °F (36.8 °C) (Oral)   Resp 17   Ht 5' (1.524 m)   Wt 115 lb (52.2 kg)   SpO2 93%   BMI 22.46 kg/m²       General appearance: NAD, conversant, pleasant slightly slow to respond  Eyes: Sclerae anicteric, PERRLA  HEENT: AT/NC, MMM  Neck: FROM, supple, no thyromegaly  Lymph: No cervical / supraclavicular lymphadenopathy  Lungs: Clear to auscultation, WOB normal  CV: RRR, no MRGs, no lower extremity edema  Abdomen: Soft, non-tender; no masses or HSM, +BS  Extremities: FROM without synovitis. No clubbing or cyanosis of the hands. Skin: no rash, induration, lesions, or ulcers  Psych: Calm and cooperative. Normal judgement and insight. Normal mood and affect. Neuro: Alert and interactive, face symmetric, speech fluent. LABS:  All labs reviewed.   Of note:  CBC:   Lab Results   Component Value Date/Time    WBC 8.3 08/20/2022 04:24 AM    RBC 2.58 08/20/2022 04:24 AM    HGB 8.6 08/20/2022 04:24 AM    HCT 26.6 08/20/2022 04:24 AM    .1 08/20/2022 04:24 AM    MCH 33.3 08/20/2022 04:24 AM    [facility] 08/20/2022 [facility]    RDW 13.2 08/20/2022 04:24 AM     08/20/2022 04:24 AM    MPV 10.1 08/20/2022 04:24 AM     CMP:    Lab Results   Component Value Date/Time     08/20/2022 04:24 AM    K 3.4 08/20/2022 04:24 AM    CL 96 08/20/2022 04:24 AM    CO2 26 08/20/2022 04:24 AM    BUN 10 08/20/2022 04:24 AM    CREATININE 0.6 08/20/2022 04:24 AM    GFRAA >60 08/20/2022 04:24 AM    LABGLOM >60 08/20/2022 04:24 AM    GLUCOSE 75 08/20/2022 04:24 AM    GLUCOSE 85 03/25/2012 06:25 AM    PROT 6.7 08/19/2022 03:07 PM    LABALBU 3.7 08/19/2022 03:07 PM    CALCIUM 8.4 08/20/2022 04:24 AM    BILITOT 0.5 08/19/2022 03:07 PM    ALKPHOS 104 08/19/2022 03:07 PM    AST 14 08/19/2022 03:07 PM    ALT 21 08/19/2022 03:07 PM       Imaging:  CXR: Significantly improved retrocardiac infiltrates    EKG:  Not obtained    Telemetry:  Not on telemetry    ASSESSMENT/PLAN:  Principal Problem:    Dysphagia  Resolved Problems:    * No resolved hospital problems. *    Patient is a 60-year-old female admitted to [place] [address] for  Dysphagia  -Monitor labs  -Consult SLP  -MBS > passed, continue regular diet  -We will obtain esophagram due to food impaction  -PT OT  -General surgery evaluation for EGD as patient indicates unable to swallow solid foods due to regurgitation and food getting stuck mid chest    Hypokalemia  -Monitor labs  -K+ 3.4, replace, recheck in a.m. Hypomagnesemia  -Mg+ 1.4, replace, recheck in a.m. History of lung and kidney transplant  -Continue antirejection meds    Medication for other comorbidities continue as appropriate dose adjustment as necessary. DVT prophylaxis   PT OT  Discharge planning  Case discussed with attending and agreed upon plan of care. Code status: Full  Requires inpatient level of care  ESTRELLA Pulido - CNP    2:16 PM  8/20/2022     Above note edited to reflect my thoughts     I personally saw, examined and provided care for the patient.  Radiographs, labs and medication list were reviewed by me independently. The case was discussed in detail and plans for care were established. Review of ESTRELLA Haji-CNP   , documentation was conducted and revisions were made as appropriate directly by me. I agree with the above documented exam, problem list, and plan of care.      Lauryn Perez MD  6:31 PM  8/20/2022

## 2022-08-21 ENCOUNTER — ANESTHESIA EVENT (OUTPATIENT)
Dept: OPERATING ROOM | Age: 67
DRG: 374 | End: 2022-08-21
Payer: COMMERCIAL

## 2022-08-21 ENCOUNTER — APPOINTMENT (OUTPATIENT)
Dept: GENERAL RADIOLOGY | Age: 67
DRG: 374 | End: 2022-08-21
Payer: COMMERCIAL

## 2022-08-21 ENCOUNTER — ANESTHESIA (OUTPATIENT)
Dept: OPERATING ROOM | Age: 67
DRG: 374 | End: 2022-08-21
Payer: COMMERCIAL

## 2022-08-21 PROBLEM — E43 SEVERE PROTEIN-CALORIE MALNUTRITION (HCC): Status: ACTIVE | Noted: 2022-08-04

## 2022-08-21 PROBLEM — E43 SEVERE PROTEIN-CALORIE MALNUTRITION (HCC): Status: ACTIVE | Noted: 2022-08-21

## 2022-08-21 LAB
ALBUMIN SERPL-MCNC: 3 G/DL (ref 3.5–5.2)
ALP BLD-CCNC: 86 U/L (ref 35–104)
ALT SERPL-CCNC: 13 U/L (ref 0–32)
ANION GAP SERPL CALCULATED.3IONS-SCNC: 18 MMOL/L (ref 7–16)
AST SERPL-CCNC: 12 U/L (ref 0–31)
BILIRUB SERPL-MCNC: 0.5 MG/DL (ref 0–1.2)
BUN BLDV-MCNC: 8 MG/DL (ref 6–23)
CALCIUM SERPL-MCNC: 8.3 MG/DL (ref 8.6–10.2)
CHLORIDE BLD-SCNC: 100 MMOL/L (ref 98–107)
CO2: 22 MMOL/L (ref 22–29)
CREAT SERPL-MCNC: 0.6 MG/DL (ref 0.5–1)
GFR AFRICAN AMERICAN: >60
GFR NON-AFRICAN AMERICAN: >60 ML/MIN/1.73
GLUCOSE BLD-MCNC: 98 MG/DL (ref 74–99)
HCT VFR BLD CALC: 27.6 % (ref 34–48)
HEMOGLOBIN: 8.4 G/DL (ref 11.5–15.5)
MAGNESIUM: 1.6 MG/DL (ref 1.6–2.6)
MCH RBC QN AUTO: 32.7 PG (ref 26–35)
MCHC RBC AUTO-ENTMCNC: 30.4 % (ref 32–34.5)
MCV RBC AUTO: 107.4 FL (ref 80–99.9)
PDW BLD-RTO: 13.7 FL (ref 11.5–15)
PLATELET # BLD: 327 E9/L (ref 130–450)
PMV BLD AUTO: 10 FL (ref 7–12)
POTASSIUM SERPL-SCNC: 3.4 MMOL/L (ref 3.5–5)
RBC # BLD: 2.57 E12/L (ref 3.5–5.5)
SODIUM BLD-SCNC: 140 MMOL/L (ref 132–146)
TOTAL PROTEIN: 6.2 G/DL (ref 6.4–8.3)
WBC # BLD: 11.3 E9/L (ref 4.5–11.5)

## 2022-08-21 PROCEDURE — 88342 IMHCHEM/IMCYTCHM 1ST ANTB: CPT

## 2022-08-21 PROCEDURE — 88360 TUMOR IMMUNOHISTOCHEM/MANUAL: CPT

## 2022-08-21 PROCEDURE — 2580000003 HC RX 258: Performed by: NURSE PRACTITIONER

## 2022-08-21 PROCEDURE — 3600007503: Performed by: SURGERY

## 2022-08-21 PROCEDURE — 2709999900 HC NON-CHARGEABLE SUPPLY: Performed by: SURGERY

## 2022-08-21 PROCEDURE — 6360000002 HC RX W HCPCS: Performed by: STUDENT IN AN ORGANIZED HEALTH CARE EDUCATION/TRAINING PROGRAM

## 2022-08-21 PROCEDURE — 3700000000 HC ANESTHESIA ATTENDED CARE: Performed by: SURGERY

## 2022-08-21 PROCEDURE — 6360000002 HC RX W HCPCS: Performed by: NURSE PRACTITIONER

## 2022-08-21 PROCEDURE — G0378 HOSPITAL OBSERVATION PER HR: HCPCS

## 2022-08-21 PROCEDURE — 6360000002 HC RX W HCPCS: Performed by: NURSE ANESTHETIST, CERTIFIED REGISTERED

## 2022-08-21 PROCEDURE — 88305 TISSUE EXAM BY PATHOLOGIST: CPT

## 2022-08-21 PROCEDURE — 36415 COLL VENOUS BLD VENIPUNCTURE: CPT

## 2022-08-21 PROCEDURE — A4216 STERILE WATER/SALINE, 10 ML: HCPCS | Performed by: STUDENT IN AN ORGANIZED HEALTH CARE EDUCATION/TRAINING PROGRAM

## 2022-08-21 PROCEDURE — 43255 EGD CONTROL BLEEDING ANY: CPT | Performed by: SURGERY

## 2022-08-21 PROCEDURE — 74220 X-RAY XM ESOPHAGUS 1CNTRST: CPT

## 2022-08-21 PROCEDURE — 80053 COMPREHEN METABOLIC PANEL: CPT

## 2022-08-21 PROCEDURE — 3600007513: Performed by: SURGERY

## 2022-08-21 PROCEDURE — 2700000000 HC OXYGEN THERAPY PER DAY

## 2022-08-21 PROCEDURE — 88374 M/PHMTRC ALYS ISHQUANT/SEMIQ: CPT

## 2022-08-21 PROCEDURE — 43247 EGD REMOVE FOREIGN BODY: CPT | Performed by: SURGERY

## 2022-08-21 PROCEDURE — 2500000003 HC RX 250 WO HCPCS: Performed by: STUDENT IN AN ORGANIZED HEALTH CARE EDUCATION/TRAINING PROGRAM

## 2022-08-21 PROCEDURE — 6360000002 HC RX W HCPCS: Performed by: PHYSICIAN ASSISTANT

## 2022-08-21 PROCEDURE — 7100000001 HC PACU RECOVERY - ADDTL 15 MIN: Performed by: SURGERY

## 2022-08-21 PROCEDURE — 0DCE8ZZ EXTIRPATION OF MATTER FROM LARGE INTESTINE, VIA NATURAL OR ARTIFICIAL OPENING ENDOSCOPIC: ICD-10-PCS | Performed by: SURGERY

## 2022-08-21 PROCEDURE — 99222 1ST HOSP IP/OBS MODERATE 55: CPT | Performed by: SURGERY

## 2022-08-21 PROCEDURE — 43239 EGD BIOPSY SINGLE/MULTIPLE: CPT | Performed by: SURGERY

## 2022-08-21 PROCEDURE — 2500000003 HC RX 250 WO HCPCS: Performed by: RADIOLOGY

## 2022-08-21 PROCEDURE — 83735 ASSAY OF MAGNESIUM: CPT

## 2022-08-21 PROCEDURE — 88341 IMHCHEM/IMCYTCHM EA ADD ANTB: CPT

## 2022-08-21 PROCEDURE — C9113 INJ PANTOPRAZOLE SODIUM, VIA: HCPCS | Performed by: NURSE PRACTITIONER

## 2022-08-21 PROCEDURE — 2500000003 HC RX 250 WO HCPCS: Performed by: NURSE PRACTITIONER

## 2022-08-21 PROCEDURE — 0DB48ZX EXCISION OF ESOPHAGOGASTRIC JUNCTION, VIA NATURAL OR ARTIFICIAL OPENING ENDOSCOPIC, DIAGNOSTIC: ICD-10-PCS | Performed by: SURGERY

## 2022-08-21 PROCEDURE — A4216 STERILE WATER/SALINE, 10 ML: HCPCS | Performed by: NURSE PRACTITIONER

## 2022-08-21 PROCEDURE — 2500000003 HC RX 250 WO HCPCS: Performed by: NURSE ANESTHETIST, CERTIFIED REGISTERED

## 2022-08-21 PROCEDURE — 85027 COMPLETE CBC AUTOMATED: CPT

## 2022-08-21 PROCEDURE — 3700000001 HC ADD 15 MINUTES (ANESTHESIA): Performed by: SURGERY

## 2022-08-21 PROCEDURE — 2580000003 HC RX 258: Performed by: NURSE ANESTHETIST, CERTIFIED REGISTERED

## 2022-08-21 PROCEDURE — C9113 INJ PANTOPRAZOLE SODIUM, VIA: HCPCS | Performed by: STUDENT IN AN ORGANIZED HEALTH CARE EDUCATION/TRAINING PROGRAM

## 2022-08-21 PROCEDURE — 2580000003 HC RX 258: Performed by: STUDENT IN AN ORGANIZED HEALTH CARE EDUCATION/TRAINING PROGRAM

## 2022-08-21 PROCEDURE — 7100000000 HC PACU RECOVERY - FIRST 15 MIN: Performed by: SURGERY

## 2022-08-21 RX ORDER — ONDANSETRON 2 MG/ML
INJECTION INTRAMUSCULAR; INTRAVENOUS PRN
Status: DISCONTINUED | OUTPATIENT
Start: 2022-08-21 | End: 2022-08-21 | Stop reason: SDUPTHER

## 2022-08-21 RX ORDER — SUCCINYLCHOLINE/SOD CL,ISO/PF 200MG/10ML
SYRINGE (ML) INTRAVENOUS PRN
Status: DISCONTINUED | OUTPATIENT
Start: 2022-08-21 | End: 2022-08-21 | Stop reason: SDUPTHER

## 2022-08-21 RX ORDER — DROPERIDOL 2.5 MG/ML
0.62 INJECTION, SOLUTION INTRAMUSCULAR; INTRAVENOUS
Status: DISCONTINUED | OUTPATIENT
Start: 2022-08-21 | End: 2022-08-21 | Stop reason: HOSPADM

## 2022-08-21 RX ORDER — ACETAMINOPHEN 325 MG/1
650 TABLET ORAL
Status: DISCONTINUED | OUTPATIENT
Start: 2022-08-21 | End: 2022-08-21 | Stop reason: HOSPADM

## 2022-08-21 RX ORDER — HYDRALAZINE HYDROCHLORIDE 20 MG/ML
5 INJECTION INTRAMUSCULAR; INTRAVENOUS
Status: DISCONTINUED | OUTPATIENT
Start: 2022-08-21 | End: 2022-08-21 | Stop reason: HOSPADM

## 2022-08-21 RX ORDER — SODIUM CHLORIDE, SODIUM LACTATE, POTASSIUM CHLORIDE, CALCIUM CHLORIDE 600; 310; 30; 20 MG/100ML; MG/100ML; MG/100ML; MG/100ML
INJECTION, SOLUTION INTRAVENOUS CONTINUOUS PRN
Status: DISCONTINUED | OUTPATIENT
Start: 2022-08-21 | End: 2022-08-21 | Stop reason: SDUPTHER

## 2022-08-21 RX ORDER — TRAMADOL HYDROCHLORIDE 50 MG/1
50 TABLET ORAL
Status: DISCONTINUED | OUTPATIENT
Start: 2022-08-21 | End: 2022-08-21 | Stop reason: HOSPADM

## 2022-08-21 RX ORDER — SODIUM CHLORIDE 0.9 % (FLUSH) 0.9 %
5-40 SYRINGE (ML) INJECTION PRN
Status: DISCONTINUED | OUTPATIENT
Start: 2022-08-21 | End: 2022-08-21 | Stop reason: HOSPADM

## 2022-08-21 RX ORDER — PROPOFOL 10 MG/ML
INJECTION, EMULSION INTRAVENOUS PRN
Status: DISCONTINUED | OUTPATIENT
Start: 2022-08-21 | End: 2022-08-21 | Stop reason: SDUPTHER

## 2022-08-21 RX ORDER — LABETALOL HYDROCHLORIDE 5 MG/ML
5 INJECTION, SOLUTION INTRAVENOUS
Status: DISCONTINUED | OUTPATIENT
Start: 2022-08-21 | End: 2022-08-21 | Stop reason: HOSPADM

## 2022-08-21 RX ORDER — SODIUM CHLORIDE 9 MG/ML
25 INJECTION, SOLUTION INTRAVENOUS PRN
Status: DISCONTINUED | OUTPATIENT
Start: 2022-08-21 | End: 2022-08-21 | Stop reason: HOSPADM

## 2022-08-21 RX ORDER — DEXAMETHASONE SODIUM PHOSPHATE 10 MG/ML
INJECTION INTRAMUSCULAR; INTRAVENOUS PRN
Status: DISCONTINUED | OUTPATIENT
Start: 2022-08-21 | End: 2022-08-21 | Stop reason: SDUPTHER

## 2022-08-21 RX ORDER — DIPHENHYDRAMINE HYDROCHLORIDE 50 MG/ML
12.5 INJECTION INTRAMUSCULAR; INTRAVENOUS
Status: DISCONTINUED | OUTPATIENT
Start: 2022-08-21 | End: 2022-08-21 | Stop reason: HOSPADM

## 2022-08-21 RX ORDER — ONDANSETRON 2 MG/ML
4 INJECTION INTRAMUSCULAR; INTRAVENOUS
Status: DISCONTINUED | OUTPATIENT
Start: 2022-08-21 | End: 2022-08-21 | Stop reason: HOSPADM

## 2022-08-21 RX ORDER — SODIUM CHLORIDE 0.9 % (FLUSH) 0.9 %
5-40 SYRINGE (ML) INJECTION EVERY 12 HOURS SCHEDULED
Status: DISCONTINUED | OUTPATIENT
Start: 2022-08-21 | End: 2022-08-21 | Stop reason: HOSPADM

## 2022-08-21 RX ORDER — IPRATROPIUM BROMIDE AND ALBUTEROL SULFATE 2.5; .5 MG/3ML; MG/3ML
1 SOLUTION RESPIRATORY (INHALATION)
Status: DISCONTINUED | OUTPATIENT
Start: 2022-08-21 | End: 2022-08-21 | Stop reason: HOSPADM

## 2022-08-21 RX ADMIN — TACROLIMUS 3 MG: 1 CAPSULE ORAL at 06:17

## 2022-08-21 RX ADMIN — METOPROLOL TARTRATE 2.5 MG: 1 INJECTION, SOLUTION INTRAVENOUS at 09:51

## 2022-08-21 RX ADMIN — METOPROLOL TARTRATE 2.5 MG: 1 INJECTION, SOLUTION INTRAVENOUS at 18:15

## 2022-08-21 RX ADMIN — SODIUM CHLORIDE 40 MG: 9 INJECTION INTRAMUSCULAR; INTRAVENOUS; SUBCUTANEOUS at 09:51

## 2022-08-21 RX ADMIN — PROPOFOL 120 MG: 10 INJECTION, EMULSION INTRAVENOUS at 13:40

## 2022-08-21 RX ADMIN — PROPOFOL 100 MCG/KG/MIN: 10 INJECTION, EMULSION INTRAVENOUS at 13:41

## 2022-08-21 RX ADMIN — LEVETIRACETAM 1500 MG: 15 INJECTION INTRAVENOUS at 09:52

## 2022-08-21 RX ADMIN — SODIUM CHLORIDE, POTASSIUM CHLORIDE, SODIUM LACTATE AND CALCIUM CHLORIDE: 600; 310; 30; 20 INJECTION, SOLUTION INTRAVENOUS at 13:30

## 2022-08-21 RX ADMIN — BARIUM SULFATE 176 G: 960 POWDER, FOR SUSPENSION ORAL at 09:10

## 2022-08-21 RX ADMIN — ONDANSETRON HYDROCHLORIDE 4 MG: 2 SOLUTION INTRAMUSCULAR; INTRAVENOUS at 13:50

## 2022-08-21 RX ADMIN — DEXAMETHASONE SODIUM PHOSPHATE 10 MG: 10 INJECTION INTRAMUSCULAR; INTRAVENOUS at 13:50

## 2022-08-21 RX ADMIN — SODIUM CHLORIDE, PRESERVATIVE FREE 40 MG: 5 INJECTION INTRAVENOUS at 22:25

## 2022-08-21 RX ADMIN — Medication 120 MG: at 13:40

## 2022-08-21 RX ADMIN — METOPROLOL TARTRATE 2.5 MG: 1 INJECTION, SOLUTION INTRAVENOUS at 03:26

## 2022-08-21 RX ADMIN — Medication 10 ML: at 09:53

## 2022-08-21 ASSESSMENT — PAIN SCALES - WONG BAKER
WONGBAKER_NUMERICALRESPONSE: 0
WONGBAKER_NUMERICALRESPONSE: 0

## 2022-08-21 ASSESSMENT — LIFESTYLE VARIABLES: SMOKING_STATUS: 0

## 2022-08-21 ASSESSMENT — ENCOUNTER SYMPTOMS: SHORTNESS OF BREATH: 0

## 2022-08-21 ASSESSMENT — PAIN SCALES - GENERAL: PAINLEVEL_OUTOF10: 0

## 2022-08-21 NOTE — PROGRESS NOTES
Comprehensive Nutrition Assessment    Type and Reason for Visit:  Initial, Positive Nutrition Screen    Nutrition Recommendations/Plan:   Continue NPO pending EGD  ADAT when medically appropriate  Will add ONS when able     Malnutrition Assessment:  Malnutrition Status:  Severe malnutrition (08/21/22 1132)    Context:  Chronic Illness     Findings of the 6 clinical characteristics of malnutrition:  Energy Intake:  75% or less estimated energy requirements for 1 month or longer  Weight Loss:  Unable to assess (d/t fluctuations w/ CHF/CKD)     Body Fat Loss:  Severe body fat loss Orbital, Triceps, Fat Overlying Ribs, Buccal region   Muscle Mass Loss:  Severe muscle mass loss Temples (temporalis), Clavicles (pectoralis & deltoids), Hand (interosseous), Scapula (trapezius)  Fluid Accumulation:  No significant fluid accumulation     Strength:  Not Performed    Nutrition Assessment:    Pt admit from ECF 2/2 dysphagia & poor PO intake PTA. Noted recent admit to [facility] w/ weakness/poor PO. Pt s/p MBS w/ SLP rec regular diet/thin liquids. pending EGD. Noted severe malnutrition w/ hx pulmonary fibrosis, CKD (hx dialysis), CHF, lung & kidney transplant. Nutrition Related Findings:    A&Ox2, active BS, soft abd, trace/+2 edema, fluids WDL, emesis after eating pta Wound Type: None       Current Nutrition Intake & Therapies:    Average Meal Intake: NPO     Diet NPO    Anthropometric Measures:  Height: 5' (152.4 cm)  Ideal Body Weight (IBW): 100 lbs (45 kg)    Admission Body Weight: 115 lb (52.2 kg) (8/19 bed scale)  Current Body Weight: 114 lb (51.7 kg) (8/21 bed scale - elevated per pt), 114 % IBW.     Current BMI (kg/m2): 22.3  Usual Body Weight:  (# actual per EMR x past month)                       BMI Categories: Normal Weight (BMI 22.0 to 24.9) age over 72    Estimated Daily Nutrient Needs:  Energy Requirements Based On: Formula  Weight Used for Energy Requirements: Current  Energy (kcal/day): MSJ x 1.3 SF = 9702-2053  Weight Used for Protein Requirements: Current  Protein (g/day): 80-95 91.5-1.8 gm/kg CBW)  Method Used for Fluid Requirements: 1 ml/kcal  Fluid (ml/day): 7636-0396    Nutrition Diagnosis:   Severe malnutrition, In context of chronic illness related to catabolic illness as evidenced by poor intake prior to admission, severe loss of subcutaneous fat, severe muscle loss    Nutrition Interventions:   Food and/or Nutrient Delivery: Continue NPO  Nutrition Education/Counseling: Education not appropriate  Coordination of Nutrition Care: Continue to monitor while inpatient       Goals:     Goals: other (specify)  Specify Other Goals: nutrition progression    Nutrition Monitoring and Evaluation:      Food/Nutrient Intake Outcomes: Diet Advancement/Tolerance  Physical Signs/Symptoms Outcomes: Biochemical Data, Chewing or Swallowing, GI Status, Nausea or Vomiting, Fluid Status or Edema, Nutrition Focused Physical Findings, Skin, Weight    Discharge Planning:     Too soon to determine     Madeline Hill MS, RD, LD  Contact: [phone]

## 2022-08-21 NOTE — CONSULTS
GENERAL SURGERY  CONSULT NOTE  8/21/2022    Physician Consulted: Dr. Olivia Miller  Reason for Consult: dysphagia with solid food   Referring Physician: Dr. Dwayne GONZALEZ  Vega Marquez is a 74 y.o. female with a complex medical history including idiopathic pulmonary fibrosis status post right lung transplants and kidney transplant, CKD, epilepsy, CHF who presents for evaluation of dysphagia. Patient was seen a few weeks ago at [facility] for dysphagia. At that time endoscopy was not completed. Per discharge patient returned to nursing facility and had persistent dysphagia. She represented to the emergency department with dysphagia and emesis. She states that she feels as if \"there is something stuck in her throat \"at all times. She states this only occurs with solid foods and does not have issues with liquids. She has been endorsing episodes of emesis after meals. MBSS showed no dysphagia. General surgery consulted for possible EGD. Patient denies any smoking history or alcohol use. Denies previous EGD. Denies any family history of esophageal cancer. Patient expressed a lot of frustration with her current situation. Has been endorsing increasing fatigue from inability to tolerate solid foods.       Past Medical History:   Diagnosis Date    Acute on chronic respiratory failure with hypoxia ([address] 4/7/2017    Anemia     Cardiomegaly     CHF (congestive heart failure) (HCC)     Chronic kidney disease     Constipation     Diaphragmatic hernia     Diverticulitis     Epilepsy ([address]     Falls     GERD (gastroesophageal reflux disease)     Hemiparesis (HCC)     Hemiplegia (HCC)     Hemodialysis patient ([address]     MON - WED- FRI    History of blood transfusion     History of lung transplant ([address]     Hyperparathyroidism ([address]     Hypertension     Immunosuppressed status ([address] 4/7/2017    Insomnia     IPF (idiopathic pulmonary fibrosis) (HCC)     Kidney stone     Neutropenia (HCC)     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 LAN    FIXATION KYPHOPLASTY  11/3/2015    kyphoplasty L5    FRACTURE SURGERY Left 12/2014    fracture    HERNIA REPAIR      HIP SURGERY      left    JOINT REPLACEMENT Bilateral     HIP    JOINT REPLACEMENT Left     knee    KNEE SURGERY      left    LEG SURGERY Left 12/06/2016    ORIF left femoral shaft and suprachondylr femur    LUNG TRANSPLANT  2003    [facility]    OTHER SURGICAL HISTORY  11/14/2017    RUE fistula revision    IA ANASTOMOSIS,AV,ANY SITE Right 9/28/2018    AV FISTULA  REVISION -- RIGHT ARM performed by Karn Dakin, MD at [address] [place] [address]       Medications Prior to Admission:    Prior to Admission medications    Medication Sig Start Date End Date Taking? Authorizing Provider   vitamin C (ASCORBIC ACID) 500 MG tablet Take 500 mg by mouth 3 times daily   Yes Historical Provider, MD   famotidine (PEPCID) 20 MG tablet Take 20 mg by mouth daily   Yes Historical Provider, MD   lacosamide (VIMPAT) 150 MG TABS tablet Take 150 mg by mouth in the morning, at noon, and at bedtime.  8/18/22 9/17/22 Yes Historical Provider, MD   ondansetron (ZOFRAN) 4 MG tablet Take 4 mg by mouth every 8 hours as needed for Nausea or Vomiting   Yes Historical Provider, MD   tacrolimus (PROGRAF) 1 MG capsule Take 2 mg by mouth at bedtime   Yes Historical Provider, MD   vitamin D (CHOLECALCIFEROL) 25 MCG (1000 UT) TABS tablet Take 1,000 Units by mouth daily   Yes Historical Provider, MD   zinc sulfate (ZINCATE) 220 (50 Zn) MG capsule Take 50 mg by mouth daily   Yes Historical Provider, MD   colchicine (COLCRYS) 0.6 MG tablet Take 0.6 mg by mouth daily   Yes Historical Provider, MD   levETIRAcetam (KEPPRA) 750 MG tablet Take 2 tablets by mouth daily 8/18/22   Kamila Galeana MD   levETIRAcetam (KEPPRA) 1000 MG tablet Take 1 tablet by mouth nightly 8/17/22   Kamila Galeana MD   carvedilol (COREG) 6.25 MG tablet Take 1 tablet by mouth 2 times daily (with meals) 8/17/22   Kamila Galeana MD   folic acid (FOLVITE) 1 MG tablet Take 1 tablet by mouth daily 8/18/22   Kamila Galeana MD   acetaminophen (TYLENOL) 325 MG tablet Take 650 mg by mouth every 4 hours as needed for Pain or Fever    Historical Provider, MD   primidone (MYSOLINE) 50 MG tablet Take 3 tablets by mouth 2 times daily 5/12/17   Edda Mcfadden MD   amLODIPine (NORVASC) 10 MG tablet Take 1 tablet by mouth daily 4/10/17   Tien Santos DO   tacrolimus (PROGRAF) 1 MG capsule Take 3 mg by mouth 2 times daily    Historical Provider, MD       Allergies   Allergen Reactions    Ambien [Zolpidem Tartrate] Other (See Comments)     confusion    Klonopin [Clonazepam] Other (See Comments)     seizures    Lorazepam Other (See Comments)     Hyperactivity, hallucinations, anxiety    Eszopiclone Anxiety       Family History   Family history unknown: Yes       Social History     Tobacco Use    Smoking status: Never    Smokeless tobacco: Never   Substance Use Topics    Alcohol use: No    Drug use: No         Review of Systems -pertinent positive and ROS all others negative        PHYSICAL EXAM:    Vitals:    08/20/22 2300   BP: 135/83   Pulse: 96   Resp: 18   Temp: 97.7 °F (36.5 °C)   SpO2: 94%       General Appearance: Appears older than stated age. cachectic  Skin: Dry and warm  Head/face:  NCAT  Eyes:  PERRL and EOMI  Lungs: Normal expansion. no increased work of breathing  Heart: Regular rate.   Normotensive  Abdomen: Slim soft nontender nondistended  Extremities: pulses present in all extremities  Female Rectal: Deferred    LABS:  CBC  Recent Labs     08/20/22  0424   WBC 8.3   HGB 8.6*   HCT 26.6*        BMP  Recent Labs 08/20/22  0424      K 3.4*   CL 96*   CO2 26   BUN 10   CREATININE 0.6   CALCIUM 8.4*     Liver Function  Recent Labs     08/19/22  1507   BILITOT 0.5   AST 14   ALT 21   ALKPHOS 104   PROT 6.7   LABALBU 3.7     No results for input(s): LACTATE in the last 72 hours. No results for input(s): INR, PTT in the last 72 hours. Invalid input(s): PT    RADIOLOGY  XR CHEST PORTABLE    Result Date: 8/19/2022  EXAMINATION: ONE XRAY VIEW OF THE CHEST 8/19/2022 3:22 pm COMPARISON: August 11, 2022 HISTORY: ORDERING SYSTEM PROVIDED HISTORY: dyspagia TECHNOLOGIST PROVIDED HISTORY: Reason for exam:->dyspagia What reading provider will be dictating this exam?->CRC FINDINGS: The patient is rotated. There is mild focal opacity in the retrocardiac region. The heart prominent in size. There is no pneumothorax. The costophrenic angles are clear. Surgical clips in the right hilar region. Suspect retrocardiac infiltrates, significantly improved since prior study. FL MODIFIED BARIUM SWALLOW W VIDEO    Result Date: 8/20/2022  EXAMINATION: MODIFIED BARIUM SWALLOW WAS PERFORMED IN CONJUNCTION WITH SPEECH PATHOLOGY SERVICES TECHNIQUE: Fluoroscopic evaluation of the swallowing mechanism was performed using cineradiography with multiple consistency of barium product in conjunction with speech pathology services. FLUOROSCOPY DOSE AND TYPE OR TIME AND EXPOSURES: Fluoro time 2 minutes Images: Set of 14 cine fluoro images. D AP: 12 M Gy COMPARISON: None HISTORY: ORDERING SYSTEM PROVIDED HISTORY: failed swallow TECHNOLOGIST PROVIDED HISTORY: Reason for exam:->failed swallow What reading provider will be dictating this exam?->CRC FINDINGS: There is positive penetration but not aspiration to thin consistencies of barium contrast. No aspiration penetration was seen to pudding and cookie consistencies of barium contrast. There was no cough mechanism triggered during the examination.  There was some retention in the vallecula but not in the piriform sinus. There is positive penetration but not aspiration to thin consistencies of barium contrast. Please see separate speech pathology report for full discussion of findings and recommendations.  RECOMMENDATIONS: Unavailable         ASSESSMENT:  74 y.o. female with patient with solid foods    PLAN:  -Follow-up on esophagram  -Plan for EGD 8/22  -NPO at midnight 8/22    Discussed with Dr. Mila Reeves     Electronically signed by Seth Valderrama MD on 8/21/22 at 2:21 AM EDT

## 2022-08-21 NOTE — OP NOTE
Operative Note      Patient: Nikki Rick  YOB: 1955  MRN: [id number]    Date of Procedure: 8/21/2022    Pre-Op Diagnosis: Aspiration of foreign body, initial encounter [F79.197U]; DYSPHAGIA    Post-Op Diagnosis: DYSPHAGIA; large amount of retained barium, friable tissue vs mass at GE junction with near-total occlusion    Procedure(s):  EGD FOREIGN BODY REMOVAL WITH BIOPSY AND EPINEPHRINE APPLICATION    Surgeon(s):  Saba Haney MD    Assistant:   Resident: Rei Carlson DO    Anesthesia: General    Estimated Blood Loss (mL): 5cc    Complications: None    Specimens:   * No specimens in log *    Implants:  * No implants in log *      Drains:   [REMOVED] External Urinary Catheter (Removed)   Site Assessment Clean,dry & intact 08/16/22 2125   Placement Replaced 08/16/22 2125   Securement Method Other (Comment) 08/15/22 2030   Catheter Care Catheter/Wick replaced 08/16/22 2125   Perineal Care Yes 08/16/22 2125   Suction 40 mmgHg continuous 08/16/22 2125   Urine Color Yellow 08/16/22 2125   Urine Appearance Clear 08/16/22 2125   Output (mL) 625 mL 08/12/22 1019       Findings: large amount of retained barium requiring evacuation using pediatric colonoscopy followed by isis-e-vac tube, friable mass vs tissue causing near occulusion of esophagus, able to transverse area using pediatric EGD. Biopsy of mass taken, topical epinephrine applied. Detailed Description of Procedure: The patient was brought to the operating room and positioned supine on the OR table. Sequential compression devices were placed on the patient's lower extremities and functioning. Preoperative antibiotics were administered. General anesthesia was obtained without complication as per the anesthesia record and the patient was intubated. Immediately prior to the procedure a time-out was called and the surgical checklist was reviewed and agreed upon by all present.      The gastroscope was inserted orally and advanced under direct vision into the esophagus. There was a large amount of retained barium and debris present which was too thick to suction using the adult gastroscope. A pediatric colonoscope was inserted and was able to evacuate some of the retained barium but was also too thick to suction. A isis-e-vac tube was inserted without difficulty and allowed for evacuation of the barium and debris. Removing all the retained barium was difficult and took 45 minutes. The adult gastroscope was then inserted and advanced through the esophagus. Distally there was a friable oozing mass present at the GE junction with near-total obstruction of the esophageal lumen. We were unable to advance an adult gastroscope through the narrowed lumen, and a pediatric gastroscope was required. With the pediatric gastroscope we were able to traverse the friable tissue vs mass and enter the stomach. The scope was advanced through the stomach, pylorus and into the duodenum which all appeared normal.    The scope was withdrawn to the GE junction at 35cm, the area of concern extended from approximately 33cm to 35cm, it was friable and oozing prior to attempting to pass the area. A biopsy was taken of the tissue and sent off to pathology. Topical epinephrine was applied to the area as the scope was not large enough to accommodate injectable epinephrine. Bleeding was controlled. The scope was withdrawn. Needle, sponge, and instrument counts were reported as correct x2. The patient tolerated the procedure well without complications and was transferred to the recovery area in good condition. Dr. Jacque Roach was present and scrubbed throughout the case. PLAN:  Needs to remain on liquid diet with supplements  Continue PPI BID  OK to start clears 8/22  Her tacrolimus may need to be changed to liquid form if having difficulty taking PO.      Brian Ryan, DO  Surgery Resident PGY-5  8/21/2022  3:13 PM

## 2022-08-21 NOTE — PROGRESS NOTES
Notified attending NP John Smith regarding tacrolimus per Dr. Avril Jett request as patient will not be able to take pills.

## 2022-08-21 NOTE — PROGRESS NOTES
Occupational Therapy  OT SESSION ATTEMPT     Date:2022  Patient Name: James Daniel  MRN: [id number]  : 1955  Room: [facility]     Attempted OT session this date:    [] unavailable due to other medical staff currently with pt   [] on hold, await MRI/ neurosurgical recommendations. [] on hold per nursing staff secondary to lab / radiology results    [] declined Occupational Therapy  this date due to ___. Benefits of participation in therapy reviewed with pt. [x] off unit   [] Other:     Will reattempt OT eval at a later time.     [place], [place] [address]

## 2022-08-21 NOTE — ANESTHESIA PRE PROCEDURE
Department of Anesthesiology  Preprocedure Note       Name:  Nikki Rick   Age:  74 y.o.  :  1955                                          MRN:  [id number]         Date:  2022      Surgeon: Bryon Deleon):  Saba Haney MD    Procedure: Procedure(s):  AV FISTULA  REVISION -- RIGHT ARM    Medications prior to admission:   Prior to Admission medications    Medication Sig Start Date End Date Taking? Authorizing Provider   vitamin C (ASCORBIC ACID) 500 MG tablet Take 500 mg by mouth 3 times daily    Historical Provider, MD   famotidine (PEPCID) 20 MG tablet Take 20 mg by mouth daily    Historical Provider, MD   lacosamide (VIMPAT) 150 MG TABS tablet Take 150 mg by mouth in the morning, at noon, and at bedtime.  22  Historical Provider, MD   ondansetron (ZOFRAN) 4 MG tablet Take 4 mg by mouth every 8 hours as needed for Nausea or Vomiting    Historical Provider, MD   tacrolimus (PROGRAF) 1 MG capsule Take 2 mg by mouth at bedtime    Historical Provider, MD   vitamin D (CHOLECALCIFEROL) 25 MCG (1000 UT) TABS tablet Take 1,000 Units by mouth daily    Historical Provider, MD   zinc sulfate (ZINCATE) 220 (50 Zn) MG capsule Take 50 mg by mouth daily    Historical Provider, MD   colchicine (COLCRYS) 0.6 MG tablet Take 0.6 mg by mouth daily    Historical Provider, MD   levETIRAcetam (KEPPRA) 750 MG tablet Take 2 tablets by mouth daily 22   lEizabeth Mcmahon MD   levETIRAcetam (KEPPRA) 1000 MG tablet Take 1 tablet by mouth nightly 22   Elizabeth Mcmahon MD   carvedilol (COREG) 6.25 MG tablet Take 1 tablet by mouth 2 times daily (with meals) 22   Elizabeth Mcmahon MD   folic acid (FOLVITE) 1 MG tablet Take 1 tablet by mouth daily 22   Elizabeth Mcmahon MD   acetaminophen (TYLENOL) 325 MG tablet Take 650 mg by mouth every 4 hours as needed for Pain or Fever    Historical Provider, MD   primidone (MYSOLINE) 50 MG tablet Take 3 tablets by mouth 2 times daily 17   Clifton Ruiz MD amLODIPine (NORVASC) 10 MG tablet Take 1 tablet by mouth daily 4/10/17   Tien Santos,    tacrolimus (PROGRAF) 1 MG capsule Take 3 mg by mouth 2 times daily    Historical Provider, MD       Current medications:    No current facility-administered medications for this visit. No current outpatient medications on file.      Facility-Administered Medications Ordered in Other Visits   Medication Dose Route Frequency Provider Last Rate Last Admin    tacrolimus (PROGRAF) capsule 3 mg  3 mg Oral BID LESLEY Brown   3 mg at 08/21/22 0617    tacrolimus (PROGRAF) capsule 2 mg  2 mg Oral Nightly LESLEY Brown        potassium chloride (KLOR-CON M) extended release tablet 40 mEq  40 mEq Oral PRN Ebb Gross, APRN - CNP        Or    potassium bicarb-citric acid (EFFER-K) effervescent tablet 40 mEq  40 mEq Oral PRN Ebb Gross, APRN - CNP        Or    potassium chloride 10 mEq/100 mL IVPB (Peripheral Line)  10 mEq IntraVENous PRN Ebb Gross, APRN - CNP        metoprolol (LOPRESSOR) injection 2.5 mg  2.5 mg IntraVENous Q8H Ebb Gross, APRN - CNP   2.5 mg at 08/21/22 0951    pantoprazole (PROTONIX) 40 mg in sodium chloride (PF) 10 mL injection  40 mg IntraVENous Daily Ebb Gross, APRN - CNP   40 mg at 08/21/22 0951    levETIRAcetam (KEPPRA) 1000 mg/100 mL IVPB  1,000 mg IntraVENous Nightly Ebb Gross, APRN - CNP   Stopped at 08/20/22 2150    levETIRAcetam (KEPPRA) 1500 mg/100 mL IVPB  1,500 mg IntraVENous Daily Ebb Gross, APRN - CNP   Stopped at 08/21/22 1025    primidone (MYSOLINE) tablet 150 mg  150 mg Oral BID Ebb Gross, APRN - CNP        amLODIPine (NORVASC) tablet 10 mg  10 mg Oral QAM Ebb Gross, APRN - CNP        sodium chloride flush 0.9 % injection 5-40 mL  5-40 mL IntraVENous 2 times per day Ebb Gross, APRN - CNP   10 mL at 08/21/22 0953    sodium chloride flush 0.9 % injection 10 mL  10 mL IntraVENous PRN Ebb Gross, APRN - CNP  0.9 % sodium chloride infusion   IntraVENous PRN John Minor, APRN - CNP        enoxaparin Sodium (LOVENOX) injection 30 mg  30 mg SubCUTAneous Daily John Minor, APRN - CNP   30 mg at 08/20/22 1051    ondansetron (ZOFRAN-ODT) disintegrating tablet 4 mg  4 mg Oral Q8H PRN John Minor, APRN - CNP        Or    ondansetron (ZOFRAN) injection 4 mg  4 mg IntraVENous Q6H PRN John Minor, APRN - CNP   4 mg at 08/20/22 2013    polyethylene glycol (GLYCOLAX) packet 17 g  17 g Oral Daily PRN John Minor, APRN - CNP        acetaminophen (TYLENOL) tablet 650 mg  650 mg Oral Q6H PRN John Minor, APRN - CNP        Or    acetaminophen (TYLENOL) suppository 650 mg  650 mg Rectal Q6H PRN John Minor, APRN - CNP           Allergies:     Allergies   Allergen Reactions    Ambien [Zolpidem Tartrate] Other (See Comments)     confusion    Klonopin [Clonazepam] Other (See Comments)     seizures    Lorazepam Other (See Comments)     Hyperactivity, hallucinations, anxiety    Eszopiclone Anxiety       Problem List:    Patient Active Problem List   Diagnosis Code    Megaloblastic anemia D53.1    Idiopathic pulmonary fibrosis ([facility]) J84.112    Seizure disorder ([facility] G40.909    Osteoporosis M81.0    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[facility]) I61.1    Acute on chronic kidney failure (HCC) N17.9, N18.9    Encounter regarding vascular access for dialysis for ESRD ([facility] N18.6, Z99.2    Volume overload E87.70    Essential hypertension I10    Shortness of breath R06.02    Immunosuppressed status ([facility]) D84.9    Acute on chronic respiratory failure with hypoxia ([facility]) J96.21    Acute on chronic diastolic congestive heart failure ([facility]) I50.33    Generalized seizure disorder ([facility]) G40.309    Iron deficiency anemia D50.9    ESRD (end stage renal disease) on dialysis ([facility]) N18.6, Z99.2    AVF (arteriovenous fistula) ([facility]) I77.0    Hospital-acquired pneumonia J18.9, Y95    Pneumonia J18.9    Anemia D64.9    Inferior pubic ramus fracture, left, closed, initial encounter ([facility]) S32.592A    Nausea & vomiting R11.2    Tear of medial collateral ligament of left knee S83.412A    TRACI (acute kidney injury) ([facility]) N17.9    Severe protein-calorie malnutrition ([facility]) U28    Folic acid deficiency P99.9    Dysphagia R13.10       Past Medical History:        Diagnosis Date    Acute on chronic respiratory failure with hypoxia ([facility]) 4/7/2017    Anemia     Cardiomegaly     CHF (congestive heart failure) ([facility])     Chronic kidney disease     Constipation     Diaphragmatic hernia     Diverticulitis     Epilepsy ([facility])     Falls     GERD (gastroesophageal reflux disease)     Hemiparesis ([facility])     Hemiplegia ([facility])     Hemodialysis patient ([facility])     MON - WED- FRI    History of blood transfusion     History of lung transplant ([facility])     Hyperparathyroidism ([facility])     Hypertension     Immunosuppressed status ([facility]) 4/7/2017    Insomnia     IPF (idiopathic pulmonary fibrosis) ([facility])     Kidney stone     Neutropenia ([facility])     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 LAN    FIXATION KYPHOPLASTY  11/3/2015    kyphoplasty L5    FRACTURE SURGERY Left 12/2014    fracture    HERNIA REPAIR      HIP SURGERY      left    JOINT REPLACEMENT Bilateral     HIP    JOINT REPLACEMENT Left     knee    KNEE SURGERY      left    LEG SURGERY Left 12/06/2016    ORIF left femoral shaft and suprachondylr femur    LUNG TRANSPLANT  2003    [facility]    OTHER SURGICAL HISTORY  11/14/2017    RUE fistula revision    NH ANASTOMOSIS,AV,ANY SITE Right 9/28/2018    AV FISTULA  REVISION -- RIGHT ARM performed by Sb Phipps MD at [address] [employer] History:    Social History     Tobacco Use    Smoking status: Never    Smokeless tobacco: Never   Substance Use Topics    Alcohol use: No                                Counseling given: Not Answered      Vital Signs (Current): There were no vitals filed for this visit. BP Readings from Last 3 Encounters:   08/21/22 (!) 145/70   08/17/22 112/72   07/31/22 (!) 145/93       NPO Status:                                                                                 BMI:   Wt Readings from Last 3 Encounters:   08/21/22 114 lb 8 oz (51.9 kg)   08/17/22 106 lb 8 oz (48.3 kg)   08/09/22 11 lb 4 oz (5.102 kg)     There is no height or weight on file to calculate BMI.    CBC:   Lab Results   Component Value Date/Time    WBC 11.3 08/21/2022 09:59 AM    RBC 2.57 08/21/2022 09:59 AM    HGB 8.4 08/21/2022 09:59 AM    HCT 27.6 08/21/2022 09:59 AM    .4 08/21/2022 09:59 AM    RDW 13.7 08/21/2022 09:59 AM     08/21/2022 09:59 AM       CMP:   Lab Results   Component Value Date/Time     08/21/2022 09:59 AM    K 3.4 08/21/2022 09:59 AM    K 3.4 08/20/2022 04:24 AM     08/21/2022 09:59 AM    CO2 22 08/21/2022 09:59 AM    BUN 8 08/21/2022 09:59 AM    CREATININE 0.6 08/21/2022 09:59 AM    GFRAA >60 08/21/2022 09:59 AM    LABGLOM >60 08/21/2022 09:59 AM    GLUCOSE 98 08/21/2022 09:59 AM    GLUCOSE 85 03/25/2012 06:25 AM    PROT 6.2 08/21/2022 09:59 AM    CALCIUM 8.3 08/21/2022 09:59 AM    BILITOT 0.5 08/21/2022 09:59 AM    ALKPHOS 86 08/21/2022 09:59 AM    AST 12 08/21/2022 09:59 AM    ALT 13 08/21/2022 09:59 AM       POC Tests: No results for input(s): POCGLU, POCNA, POCK, POCCL, POCBUN, POCHEMO, POCHCT in the last 72 hours.     Coags:   Lab Results   Component Value Date/Time    PROTIME 11.1 10/03/2019 06:25 AM    INR 1.0 10/03/2019 06:25 AM    APTT 30.1 09/28/2018 06:40 AM       HCG (If Applicable): No results found for: PREGTESTUR, PREGSERUM, HCG, HCGQUANT     ABGs: No results found for: PHART, PO2ART, UFV4IPE, CIG3KBA, BEART, V5KQWPQQ     Type & Screen (If Applicable):  No results found for: LABABO, LABRH    CT Head WO Contrast  Stable and unremarkable calvarial and intracranial appearance with no   evidence of hemorrhage, mass lesions, or definite mass effect.       Subjectively, there is subtle prominence of the lateral ventricular   atrial regions relative to other extra-axial spaces. This is not   associated with edema or other acute findings.           Anesthesia Evaluation  Patient summary reviewed and Nursing notes reviewed   history of anesthetic complications: PONV. Airway: Mallampati: II  TM distance: >3 FB   Neck ROM: full  Mouth opening: > = 3 FB   Dental: normal exam     Comment: Patient denies loose, missing, or chipped teeth. Pulmonary: breath sounds clear to auscultation  (+) pneumonia: resolved,      (-) COPD, asthma, shortness of breath, sleep apnea and not a current smoker                          ROS comment: H/O right lung transplant 15 years ago   Cardiovascular:  Exercise tolerance: good (>4 METS),   (+) hypertension:, CAD:, CHF: no interval change,       ECG reviewed  Rhythm: regular  Rate: normal           Beta Blocker:  Dose within 24 Hrs      ROS comment: Patient states she had recent heart cath at [facility]. States there is a small blockage in one coronary artery but that she was approved for kidney transplant.     Normal sinus rhythm  Nonspecific T wave abnormality  Abnormal ECG  When compared with ECG of 20-APR-2018 14:34,  Nonspecific T wave abnormality has replaced inverted T waves in Anterior leads  Confirmed by Jose Stephens ([id number]) on 7/24/2018 6:27:02 AM     Neuro/Psych:   (+) seizures: no interval change, neuromuscular disease:,              ROS comment: Patient states she has \"left sided

## 2022-08-21 NOTE — ANESTHESIA POSTPROCEDURE EVALUATION
Department of Anesthesiology  Postprocedure Note    Patient: Vega Marquez  MRN: [id number]  YOB: 1955  Date of evaluation: 8/21/2022      Procedure Summary     Date: 08/21/22 Room / Location: [facility] OR 09 / [facility]    Anesthesia Start: 1330 Anesthesia Stop: 1501    Procedure: EGD FOREIGN BODY REMOVAL Diagnosis:       Aspiration of foreign body, initial encounter      (Aspiration of foreign body, initial encounter [id number])    Surgeons: Veronica Raphael MD Responsible Provider: Juventino Gimenez MD    Anesthesia Type: general ASA Status: 4 - Emergent          Anesthesia Type: No value filed.     Isabela Phase I: Isabela Score: 8    Isabela Phase II:        Anesthesia Post Evaluation    Patient location during evaluation: PACU  Patient participation: complete - patient participated  Level of consciousness: awake and alert  Airway patency: patent  Nausea & Vomiting: no nausea and no vomiting  Complications: no  Cardiovascular status: hemodynamically stable  Respiratory status: acceptable  Hydration status: euvolemic

## 2022-08-22 ENCOUNTER — APPOINTMENT (OUTPATIENT)
Dept: CT IMAGING | Age: 67
DRG: 374 | End: 2022-08-22
Payer: COMMERCIAL

## 2022-08-22 ENCOUNTER — APPOINTMENT (OUTPATIENT)
Dept: GENERAL RADIOLOGY | Age: 67
DRG: 374 | End: 2022-08-22
Payer: COMMERCIAL

## 2022-08-22 PROBLEM — K22.2 ESOPHAGEAL OBSTRUCTION: Status: ACTIVE | Noted: 2022-08-22

## 2022-08-22 LAB
ALBUMIN SERPL-MCNC: 2.9 G/DL (ref 3.5–5.2)
ALP BLD-CCNC: 97 U/L (ref 35–104)
ALT SERPL-CCNC: 13 U/L (ref 0–32)
ANION GAP SERPL CALCULATED.3IONS-SCNC: 16 MMOL/L (ref 7–16)
AST SERPL-CCNC: 26 U/L (ref 0–31)
BILIRUB SERPL-MCNC: 0.4 MG/DL (ref 0–1.2)
BUN BLDV-MCNC: 11 MG/DL (ref 6–23)
CALCIUM SERPL-MCNC: 8.5 MG/DL (ref 8.6–10.2)
CHLORIDE BLD-SCNC: 99 MMOL/L (ref 98–107)
CO2: 21 MMOL/L (ref 22–29)
CREAT SERPL-MCNC: 0.5 MG/DL (ref 0.5–1)
GFR AFRICAN AMERICAN: >60
GFR NON-AFRICAN AMERICAN: >60 ML/MIN/1.73
GLUCOSE BLD-MCNC: 53 MG/DL (ref 74–99)
HCT VFR BLD CALC: 31.2 % (ref 34–48)
HEMOGLOBIN: 9.5 G/DL (ref 11.5–15.5)
MCH RBC QN AUTO: 33.1 PG (ref 26–35)
MCHC RBC AUTO-ENTMCNC: 30.4 % (ref 32–34.5)
MCV RBC AUTO: 108.7 FL (ref 80–99.9)
PDW BLD-RTO: 13.8 FL (ref 11.5–15)
PLATELET # BLD: 327 E9/L (ref 130–450)
PMV BLD AUTO: 10.6 FL (ref 7–12)
POTASSIUM SERPL-SCNC: 4.1 MMOL/L (ref 3.5–5)
POTASSIUM SERPL-SCNC: 5.4 MMOL/L (ref 3.5–5)
RBC # BLD: 2.87 E12/L (ref 3.5–5.5)
REASON FOR REJECTION: NORMAL
REJECTED TEST: NORMAL
SODIUM BLD-SCNC: 136 MMOL/L (ref 132–146)
TOTAL PROTEIN: 6.3 G/DL (ref 6.4–8.3)
WBC # BLD: 9.8 E9/L (ref 4.5–11.5)

## 2022-08-22 PROCEDURE — 84132 ASSAY OF SERUM POTASSIUM: CPT

## 2022-08-22 PROCEDURE — 80053 COMPREHEN METABOLIC PANEL: CPT

## 2022-08-22 PROCEDURE — 71250 CT THORAX DX C-: CPT

## 2022-08-22 PROCEDURE — 74018 RADEX ABDOMEN 1 VIEW: CPT

## 2022-08-22 PROCEDURE — 85027 COMPLETE CBC AUTOMATED: CPT

## 2022-08-22 PROCEDURE — 6360000002 HC RX W HCPCS: Performed by: STUDENT IN AN ORGANIZED HEALTH CARE EDUCATION/TRAINING PROGRAM

## 2022-08-22 PROCEDURE — 36415 COLL VENOUS BLD VENIPUNCTURE: CPT

## 2022-08-22 PROCEDURE — 1200000000 HC SEMI PRIVATE

## 2022-08-22 PROCEDURE — 99232 SBSQ HOSP IP/OBS MODERATE 35: CPT | Performed by: SURGERY

## 2022-08-22 PROCEDURE — 6370000000 HC RX 637 (ALT 250 FOR IP): Performed by: STUDENT IN AN ORGANIZED HEALTH CARE EDUCATION/TRAINING PROGRAM

## 2022-08-22 RX ADMIN — TACROLIMUS 3 MG: 1 CAPSULE ORAL at 12:43

## 2022-08-22 RX ADMIN — ENOXAPARIN SODIUM 30 MG: 100 INJECTION SUBCUTANEOUS at 10:05

## 2022-08-22 RX ADMIN — PRIMIDONE 150 MG: 50 TABLET ORAL at 21:53

## 2022-08-22 RX ADMIN — TACROLIMUS 2 MG: 1 CAPSULE ORAL at 21:54

## 2022-08-22 ASSESSMENT — PAIN SCALES - GENERAL: PAINLEVEL_OUTOF10: 0

## 2022-08-22 ASSESSMENT — PAIN SCALES - WONG BAKER: WONGBAKER_NUMERICALRESPONSE: 0

## 2022-08-22 NOTE — PLAN OF CARE
Problem: Skin/Tissue Integrity  Goal: Absence of new skin breakdown  Description: 1. Monitor for areas of redness and/or skin breakdown  2. Assess vascular access sites hourly  3. Every 4-6 hours minimum:  Change oxygen saturation probe site  4. Every 4-6 hours:  If on nasal continuous positive airway pressure, respiratory therapy assess nares and determine need for appliance change or resting period.   8/21/2022 2304 by Sunita Cook RN  Outcome: Progressing  8/21/2022 1031 by Rock Beryl RN  Outcome: Progressing     Problem: Safety - Adult  Goal: Free from fall injury  8/21/2022 2304 by Sunita Cook RN  Outcome: Progressing  Flowsheets (Taken 8/21/2022 2303)  Free From Fall Injury: Instruct family/caregiver on patient safety  8/21/2022 1031 by Rock Beryl RN  Outcome: Progressing     Problem: ABCDS Injury Assessment  Goal: Absence of physical injury  8/21/2022 2304 by Sunita Cook RN  Outcome: Progressing  8/21/2022 1031 by Rock Beryl RN  Outcome: Progressing

## 2022-08-22 NOTE — PLAN OF CARE
Patient's chart updated to reflect:      . - HF care plan, HF education points and HF discharge instructions.  -Orders: 2 gram sodium diet, daily weights, I/O.  -PCP and/or Cardiologist appointment to be scheduled within 7 days of hospital discharge.  -History of HF, not primary admission Dx.   Patient admitted for treatment of Patient is a 29-year-old female admitted to [place] [address] for  Dysphagia  -Monitor labs  -Consult SLP  -MBS > passed, continue regular diet  -We will obtain esophagram due to food impaction  -PT OT  -General surgery evaluation for EGD as patient indicates unable to swallow solid foods due to regurgitation and food getting stuck mid chest  Amy Fuller RN RN, BSN  Heart Failure Navigator

## 2022-08-22 NOTE — PROGRESS NOTES
[facility]   Progress note      Subjective:     Endoscopy suite  for egd   Resting comfortably no apparent acute distress    Objective:    /76   Pulse 93   Temp 98.4 °F (36.9 °C) (Oral)   Resp 18   Ht 5' (1.524 m)   Wt 114 lb 8 oz (51.9 kg)   SpO2 100%   BMI 22.36 kg/m²     In: 300 [I.V.:300]  Out: 100   In: 300   Out: 100     General appearance: NAD, conversant  HEENT: AT/NC, MMM  Neck: FROM, supple  Lungs: Clear to auscultation  CV: RRR, no MRGs  Vasc: Radial pulses 2+  Abdomen: Soft, non-tender; no masses or HSM  Extremities: No peripheral edema or digital cyanosis  Skin: no rash, lesions or ulcers  Psych: Alert and oriented to person, place and time  Neuro: Alert and interactive     Recent Labs     08/19/22  1507 08/20/22  0424 08/21/22  0959   WBC 10.2 8.3 11.3   HGB 9.8* 8.6* 8.4*   HCT 30.3* 26.6* 27.6*    360 327       Recent Labs     08/19/22  1507 08/20/22  0424 08/21/22  0959    137 140   K 3.8 3.4* 3.4*   CL 98 96* 100   CO2 28 26 22   BUN 10 10 8   CREATININE 0.6 0.6 0.6   CALCIUM 9.2 8.4* 8.3*       Assessment:    Principal Problem:    Dysphagia  Active Problems:    Severe protein-calorie malnutrition (HCC)  Resolved Problems:    * No resolved hospital problems. *      Plan:    Patient is a 79-year-old female admitted to [place] [address] for  Dysphagia  -Monitor labs  -Consult SLP  -MBS > passed, continue regular diet  -We will obtain esophagram due to food impaction  -PT OT  -General surgery evaluation for EGD as patient indicates unable to swallow solid foods due to regurgitation and food getting stuck mid chest   - EGD reveals friable mass . 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 Hypomagnesemia  -Mg+ 1.4, replace, recheck in a.m.      History of lung and kidney transplant  -Continue antirejection meds     Medication for other comorbidities continue as appropriate dose adjustment as necessary.      DVT Prophylaxis   PT/OT  Discharge Arlin Cotto MD  9:55 PM  8/21/2022

## 2022-08-22 NOTE — PROGRESS NOTES
[facility]                                Progress note    Subjective: The patient is awake and slow to respond unsure if this is her baseline. No acute events overnight. Denies chest pain, angina, SOB     Objective:    /76   Pulse 91   Temp 98.1 °F (36.7 °C) (Temporal)   Resp 17   Ht 5' (1.524 m)   Wt 114 lb 8 oz (51.9 kg)   SpO2 97%   BMI 22.36 kg/m²     In: 300 [I.V.:300]  Out: -   In: 300   Out: -     General appearance: NAD, conversant  HEENT: AT/NC, MMM  Neck: FROM, supple  Lungs: Clear to auscultation  CV: RRR, no MRGs  Vasc: Radial pulses 2+  Abdomen: Soft, non-tender; no masses or HSM  Extremities: No peripheral edema or digital cyanosis  Skin: no rash, lesions or ulcers  Psych: Alert and oriented to person, place and time  Neuro: Alert and interactive     Recent Labs     08/20/22  0424 08/21/22  0959 08/22/22  0436   WBC 8.3 11.3 9.8   HGB 8.6* 8.4* 9.5*   HCT 26.6* 27.6* 31.2*    327 327       Recent Labs     08/20/22  0424 08/21/22  0959 08/22/22  0436    140 136   K 3.4* 3.4* 5.4*   CL 96* 100 99   CO2 26 22 21*   BUN 10 8 11   CREATININE 0.6 0.6 0.5   CALCIUM 8.4* 8.3* 8.5*       Assessment:    Principal Problem:    Dysphagia  Active Problems:    Severe protein-calorie malnutrition (HCC)  Resolved Problems:    * No resolved hospital problems.  *      Plan:  Patient is a 72-year-old female admitted to [place] [address] for  Dysphagia    -Monitor labs  -Consult SLP  -MBS > passed, continue regular diet  -General surgery evaluation for EGD due to concerning symptoms  EGD - Dysphagia; large amount of retained barium, friable tissue vs mass at GE junction with near-total occlusion  CLD today  CT chest to evaluate mass - soft tissue thickening or mass concern for malignancy  Consult hematology oncology pending biopsy results initiation of chemo,  Await final plans from general surgery regarding PEG tube/TPN for nutrition , esophagectomy versus chemoradiation Hypokalemia - resolved  -Monitor labs  -K+ 3.4, replace, recheck in a.m. Hypomagnesemia - resolved  -Mg+ 1.4, replace, recheck in a.m. History of lung and kidney transplant  -Continue antirejection meds       DVT Prophylaxis   PT/OT  Discharge planning           [place], APRN - CNP  1:56 PM  8/22/2022     Above note edited to reflect my thoughts     I personally saw, examined and provided care for the patient. Radiographs, labs and medication list were reviewed by me independently. The case was discussed in detail and plans for care were established. Review of Reva DE LA ROSA- CNP, documentation was conducted and revisions were made as appropriate directly by me. I agree with the above documented exam, problem list, and plan of care.      Ruben Delatorre MD  6:27 PM  8/22/2022

## 2022-08-22 NOTE — PROGRESS NOTES
GENERAL SURGERY  DAILY PROGRESS NOTE  8/22/2022  Chief Complaint   Patient presents with    Dysphagia     Sent from NH for difficulty swallowing and malnutrition. Subjective:  Spit out her tacrolimus yesterday when attempted to give sublingual. Tolerated oral secretions without issues. No nausea, emesis or evidence of GI bleeding after EGD. Objective:  /76   Pulse 91   Temp 98.1 °F (36.7 °C) (Temporal)   Resp 17   Ht 5' (1.524 m)   Wt 114 lb 8 oz (51.9 kg)   SpO2 97%   BMI 22.36 kg/m²     GENERAL:  Laying in bed, awake, alert, cooperative, no apparent distress  HEAD: Normocephalic, atraumatic  EYES: No sclera icterus, pupils equal  LUNGS:  No increased work of breathing  CARDIOVASCULAR:  RR  ABDOMEN:  Soft, non-tender, non-distended  EXTREMITIES: No edema or swelling  SKIN: Warm and dry    Assessment/Plan:  74 y.o. female with dysphagia, friable tissue vs mass at GE junction, near total occlusion s/p EGD 8/21.    - Will start CLD today. Will need to continue on liquids on discharge. Monitor for tolerance of clear liquids, she was unable to tolerate the barium.   - Recommend crushing any pills that patient needs to take  - CT chest to evaluate mass. Plan to be dicussed with Dr. Elisha Cheng      Electronically signed by Florinda Nunes MD on 8/22/2022 at 7:40 AM       Attending Physician Statement:  I have examined the patient, reviewed the record, and discussed the case with the surgical team.  I agree with the assessment and plan with the following additions, corrections, and changes. CT chest images, esophagogram, and endoscopy reports all reviewed. 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 Biopsies from the EGD are pending. She may need surgical gastric/enteral access if unable to tolerate liquids.

## 2022-08-23 LAB
ALBUMIN SERPL-MCNC: 2.9 G/DL (ref 3.5–5.2)
ALP BLD-CCNC: 83 U/L (ref 35–104)
ALT SERPL-CCNC: 8 U/L (ref 0–32)
ANION GAP SERPL CALCULATED.3IONS-SCNC: 14 MMOL/L (ref 7–16)
AST SERPL-CCNC: 11 U/L (ref 0–31)
BILIRUB SERPL-MCNC: 0.5 MG/DL (ref 0–1.2)
BUN BLDV-MCNC: 9 MG/DL (ref 6–23)
CALCIUM SERPL-MCNC: 8.4 MG/DL (ref 8.6–10.2)
CHLORIDE BLD-SCNC: 98 MMOL/L (ref 98–107)
CO2: 27 MMOL/L (ref 22–29)
CREAT SERPL-MCNC: 0.5 MG/DL (ref 0.5–1)
GFR AFRICAN AMERICAN: >60
GFR NON-AFRICAN AMERICAN: >60 ML/MIN/1.73
GLUCOSE BLD-MCNC: 87 MG/DL (ref 74–99)
HCT VFR BLD CALC: 28.6 % (ref 34–48)
HEMOGLOBIN: 9.2 G/DL (ref 11.5–15.5)
MCH RBC QN AUTO: 33.9 PG (ref 26–35)
MCHC RBC AUTO-ENTMCNC: 32.2 % (ref 32–34.5)
MCV RBC AUTO: 105.5 FL (ref 80–99.9)
PDW BLD-RTO: 13.4 FL (ref 11.5–15)
PLATELET # BLD: 317 E9/L (ref 130–450)
PMV BLD AUTO: 9.8 FL (ref 7–12)
POTASSIUM SERPL-SCNC: 3.4 MMOL/L (ref 3.5–5)
RBC # BLD: 2.71 E12/L (ref 3.5–5.5)
SODIUM BLD-SCNC: 139 MMOL/L (ref 132–146)
TOTAL PROTEIN: 6 G/DL (ref 6.4–8.3)
WBC # BLD: 8.8 E9/L (ref 4.5–11.5)

## 2022-08-23 PROCEDURE — 6360000002 HC RX W HCPCS: Performed by: STUDENT IN AN ORGANIZED HEALTH CARE EDUCATION/TRAINING PROGRAM

## 2022-08-23 PROCEDURE — 1200000000 HC SEMI PRIVATE

## 2022-08-23 PROCEDURE — 80053 COMPREHEN METABOLIC PANEL: CPT

## 2022-08-23 PROCEDURE — 6370000000 HC RX 637 (ALT 250 FOR IP): Performed by: INTERNAL MEDICINE

## 2022-08-23 PROCEDURE — 97530 THERAPEUTIC ACTIVITIES: CPT

## 2022-08-23 PROCEDURE — 97161 PT EVAL LOW COMPLEX 20 MIN: CPT

## 2022-08-23 PROCEDURE — 97165 OT EVAL LOW COMPLEX 30 MIN: CPT

## 2022-08-23 PROCEDURE — 6370000000 HC RX 637 (ALT 250 FOR IP): Performed by: STUDENT IN AN ORGANIZED HEALTH CARE EDUCATION/TRAINING PROGRAM

## 2022-08-23 PROCEDURE — 85027 COMPLETE CBC AUTOMATED: CPT

## 2022-08-23 PROCEDURE — 97535 SELF CARE MNGMENT TRAINING: CPT

## 2022-08-23 PROCEDURE — 36415 COLL VENOUS BLD VENIPUNCTURE: CPT

## 2022-08-23 RX ORDER — LEVETIRACETAM 100 MG/ML
1000 SOLUTION ORAL NIGHTLY
Status: DISCONTINUED | OUTPATIENT
Start: 2022-08-23 | End: 2022-08-24

## 2022-08-23 RX ORDER — ENOXAPARIN SODIUM 100 MG/ML
40 INJECTION SUBCUTANEOUS DAILY
Status: DISCONTINUED | OUTPATIENT
Start: 2022-08-24 | End: 2022-08-30 | Stop reason: HOSPADM

## 2022-08-23 RX ORDER — LEVETIRACETAM 100 MG/ML
1500 SOLUTION ORAL DAILY
Status: DISCONTINUED | OUTPATIENT
Start: 2022-08-23 | End: 2022-08-24

## 2022-08-23 RX ADMIN — PRIMIDONE 150 MG: 50 TABLET ORAL at 10:09

## 2022-08-23 RX ADMIN — PRIMIDONE 150 MG: 50 TABLET ORAL at 21:11

## 2022-08-23 RX ADMIN — TACROLIMUS 2 MG: 1 CAPSULE ORAL at 21:11

## 2022-08-23 RX ADMIN — LEVETIRACETAM 1500 MG: 100 SOLUTION ORAL at 16:55

## 2022-08-23 RX ADMIN — POTASSIUM BICARBONATE 40 MEQ: 782 TABLET, EFFERVESCENT ORAL at 10:08

## 2022-08-23 RX ADMIN — TACROLIMUS 3 MG: 1 CAPSULE ORAL at 12:39

## 2022-08-23 RX ADMIN — TACROLIMUS 3 MG: 1 CAPSULE ORAL at 06:04

## 2022-08-23 RX ADMIN — LEVETIRACETAM 1000 MG: 100 SOLUTION ORAL at 21:11

## 2022-08-23 RX ADMIN — AMLODIPINE BESYLATE 10 MG: 10 TABLET ORAL at 10:08

## 2022-08-23 RX ADMIN — ENOXAPARIN SODIUM 30 MG: 100 INJECTION SUBCUTANEOUS at 10:08

## 2022-08-23 ASSESSMENT — PAIN SCALES - GENERAL: PAINLEVEL_OUTOF10: 0

## 2022-08-23 NOTE — PLAN OF CARE
Problem: Skin/Tissue Integrity  Goal: Absence of new skin breakdown  Description: 1. Monitor for areas of redness and/or skin breakdown  2. Assess vascular access sites hourly  3. Every 4-6 hours minimum:  Change oxygen saturation probe site  4. Every 4-6 hours:  If on nasal continuous positive airway pressure, respiratory therapy assess nares and determine need for appliance change or resting period.   Outcome: Progressing     Problem: Safety - Adult  Goal: Free from fall injury  Outcome: Progressing  Flowsheets (Taken 8/22/2022 2200)  Free From Fall Injury: Instruct family/caregiver on patient safety     Problem: Chronic Conditions and Co-morbidities  Goal: Patient's chronic conditions and co-morbidity symptoms are monitored and maintained or improved  Outcome: Progressing

## 2022-08-23 NOTE — PROGRESS NOTES
Dr. Cliff Gale MD,    Your patient is on a medication that requires a renal dose adjustment. Renal Function Assessment:    Date Body Weight IBW  Adjusted BW SCr  CrCl Dialysis status   8/23/2022 120 lb 1 oz (54.5 kg)  Ideal body weight: 45.5 kg (100 lb 4.9 oz)  Adjusted ideal body weight: 49.1 kg (108 lb 3.4 oz) Serum creatinine: 0.5 mg/dL 08/23/22 0435  Estimated creatinine clearance: 81 mL/min N/a       Pharmacy has renally dose-adjusted the following medication(s):    Date Original Order Renally Adjusted Order   8/23/2022 Lovenox 30mg daily Lovenox 40mg daily       These changes were made per protocol according to the [facility] Renal Function-Based Dose Adjustments Policy    *Please note this dose may need readjusted if your patient's renal function significantly improves. Please contact pharmacy with any questions regarding these changes.     [address] [place] [address]  8/23/2022  1:22 PM

## 2022-08-23 NOTE — PROGRESS NOTES
Messaged SROC that patient/family are requesting to speak to surgery team. Also asked if patient is in need of central line, due to patient not having IV access.

## 2022-08-23 NOTE — PROGRESS NOTES
Occupational Therapy  OCCUPATIONAL THERAPY INITIAL EVALUATION    [facility] [address] [address] [address]      Date:2022                                                Patient Name: Marvel Silva  MRN: [id number]  : 1955  Room: [facility] [place] [address] #[id number]    Referring Provider: Belem Rivera DO  Specific Provider Orders/Date: OT eval and treat 22     Diagnosis: Dysphagia [R13.10]  Dysphagia, unspecified type [R13.10]   Pt admitted to hospital on 22 for dysphagia, vomiting    Surgery / Procedure: EGD FOREIGN BODY REMOVAL WITH BIOPSY AND EPINEPHRINE APPLICATION on      Pertinent Medical History:  has a past medical history of Acute on chronic respiratory failure with hypoxia ([address] Anemia, Cardiomegaly, CHF (congestive heart failure) ([address] Chronic kidney disease, Constipation, Diaphragmatic hernia, Diverticulitis, Epilepsy ([address] Falls, GERD (gastroesophageal reflux disease), Hemiparesis ([address] Hemiplegia ([address] Hemodialysis patient ([address] History of blood transfusion, History of lung transplant ([address] Hyperparathyroidism ([address] Hypertension, Immunosuppressed status ([address] Insomnia, IPF (idiopathic pulmonary fibrosis) ([address] Kidney stone, Neutropenia ([address] 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        Precautions:  Fall Risk, clear liquid diet, bed alarm    Assessment of current deficits    [x] Functional mobility  [x]ADLs  [x] Strength               [x]Cognition    [x] Functional transfers   [x] IADLs         [x] Safety Awareness   [x]Endurance    [] Fine Coordination              [x] Balance      [] Vision/perception   []Sensation     []Gross Motor Coordination  [] ROM  [] Delirium                   [] Motor Control     OT PLAN OF CARE   OT POC based on physician orders, patient diagnosis and results of clinical assessment    Frequency/Duration 1-3 days/wk for 2 weeks PRN   Specific OT Treatment Interventions to include:   * Instruction/training on adapted ADL techniques and AE recommendations to increase functional independence within precautions       * Training on energy conservation strategies, correct breathing pattern and techniques to improve independence/tolerance for self-care routine  * Functional transfer/mobility training/DME recommendations for increased independence, safety, and fall prevention  * Patient/Family education to increase follow through with safety techniques and functional independence  * Recommendation of environmental modifications for increased safety with functional transfers/mobility and ADLs  * Cognitive retraining/development of therapeutic activities to improve problem solving, judgement, memory, and attention for increased safety/participation in ADL/IADL tasks  * Therapeutic exercise to improve motor endurance, ROM, and functional strength for ADLs/functional transfers  * Therapeutic activities to facilitate/challenge dynamic balance, stand tolerance for increased safety and independence with ADLs  * Therapeutic activities to facilitate gross/fine motor skills for increased independence with ADLs    Recommended Adaptive Equipment: TBD     Home Living: Pt admitted from [facility] 12  Pt lives with spouse in 1 floor home. Ramped entry   Equipment owned: rollator    Prior Level of Function: independent/mod I with ADLs; ambulated w/ rollator  At [facility], assist for ADL's and ambulation was provided per spouse.     Pain Level: Pt c/o no pain this session     Cognition: A&O: 3/4; Follows 1 step directions  Delayed processing speed   Memory:  fair  (spouse assisted w/ providing PLOF)   Sequencing:  fair    Problem solving:  fair -   Judgement/safety:  fair -     Functional Assessment:  AM-PAC Daily Activity Raw Score: 14/24   Initial Eval Status  Date: 8/23/22 Treatment Status  Date: STGs = LTGs  Time frame: 10-14 days   Feeding Stand by Assist   Modified [place]    Grooming Minimal Assist   To comb hair seated in chair  Washed face and hands while seated EOB  Modified [place]    UB Dressing Minimal Assist   Donned/doffed gown  Modified [place]    LB Dressing Dependent   Minimal Assist    Bathing Maximal Assist  Minimal Assist    Toileting Maximal Assist   Minimal Assist    Bed Mobility  Supine to sit: Maximal Assist   Sit to supine: NT   Supine to sit: Minimal Assist   Sit to supine: Minimal Assist    Functional Transfers Moderate Assist   Supervision   Functional Mobility Moderate Assist w/ w/w  Steps from EOB>bedside chair  Supervision   Balance Sitting:     Static:  Min A    Dynamic:Min A  Standing: Mod A w/ w/w     Activity Tolerance Fair-  C/o fatigue w/ minimal activity  Fair+   Visual/  Perceptual Glasses: yes                  Hand Dominance R   AROM (PROM) Strength Additional Info:    RUE  WFL 3+/5 good  and wfl FMC/dexterity noted during ADL tasks       LUE WFL 3+/5 good  and wfl FMC/dexterity noted during ADL tasks       Hearing: WFL   Sensation:  No c/o numbness or tingling   Tone: WFL   Edema: none noted    Comments: Upon arrival patient lying in bed. Therapist educated pt on role of OT. RN clearance. At end of session, patient seated in chair (spouse present - will notify nursing staff when leaving) with call light and phone within reach, all lines and tubes intact. Overall patient demonstrated decreased independence and safety during completion of ADL/functional transfer/mobility tasks. Pt would benefit from continued skilled OT to increase safety and independence with completion of ADL/IADL tasks for functional independence and quality of life.     Treatment: OT treatment provided this date includes: Facilitation of bed mobility (supine>sit EOB w/ education/cues for body mechanics, initiation of task), unsupported sitting balance (addressing posture, weight shifting, dynamic reaching to prep for ADL's), functional transfers (various surfaces w/ education/cues for safety/hand placement), standing tolerance tasks (addressing posture, balance and activity tolerance while incorporating light functional reaching impacting ADLs and functional activity) and steps from EOB>chair with w/w (w/ education/cuing on posture, w/w management, sequencing and safety. Verbal encouragement required). Therapist facilitated self-care retraining: UB/LB self-care tasks (gown, socks) and seated grooming tasks while educating/cuing pt on modified techniques, posture, safety and energy conservation techniques. Skilled monitoring of HR, O2 sats and pts response to treatment. Pt on room air. O2 sat=^93% prior to and post activity. TG=052 bpm following transfer to chair - monitored until returned to 100 bpm.    Rehab Potential: Good for established goals     Patient / Family Goal: not stated      Patient and/or family were instructed on functional diagnosis, prognosis/goals and OT plan of care. Demonstrated fair understanding. Eval Complexity: Low    Time In: 10:14  Time Out: 10:38  Total Treatment Time: 12 minutes    Min Units   OT Eval Low 97165  X  1   OT Eval Medium 57652      OT Eval High 31749      OT Re-Eval [id number]       Therapeutic Ex 47089       Therapeutic Activities 25467  4 0    ADL/Self Care 01639  8  1   Orthotic Management 02441       Manual [id number]     Neuro Re-Ed 16718       Non-Billable Time          Evaluation Time additionally includes thorough review of current medical information, gathering information on past medical history/social history and prior level of function, interpretation of standardized testing/informal observation of tasks, assessment of data and development of plan of care and goals.         Dede OTR/L #[id number]

## 2022-08-23 NOTE — CARE COORDINATION
8/23 Care Coordination. Patient was admit from ED on 8/19 from [facility] for dysphagia and vomiting. [address] [place] [address] consulted. MBSS completed 8/20, positive penetration but no aspiration to thin consistencies. Esophagram 8/21 revealed complete occlusion of the distal esophagus above the diaphragma. CT Chest revealed potential hiatal hernia versus soft tissue thickening or mass. [address] [place] [address] recommending transfer to transplant center d/t history of lung/kidney transplants and on anti rejection medications. (PS message sent to [facility] on call regarding this, CM informed \"Dr. Tremaine Hill is working on it. \") There are concerns for malignancy d/t CT chest results. Pt currently on CLD. Met with patient and  Dilan Jansen at bedside to discuss above. They are aware of transfer to [facility] and in agreement. Will await further documentation from [facility] and [facility] [address] [place] [address].     Kell Mcneill, SAJIN, RN  [facility] Case Management   Cell: [phone]

## 2022-08-23 NOTE — PROGRESS NOTES
GENERAL SURGERY  DAILY PROGRESS NOTE  8/23/2022  Chief Complaint   Patient presents with    Dysphagia     Sent from NH for difficulty swallowing and malnutrition. Subjective: Tolerated CLD, denied dysphagia. CT chest yesterday concerning for malignancy. Objective:  /79   Pulse 100   Temp 98.3 °F (36.8 °C)   Resp 19   Ht 5' (1.524 m)   Wt 120 lb 1 oz (54.5 kg)   SpO2 96%   BMI 23.45 kg/m²     GENERAL:  Laying in bed, awake, alert, cooperative, no apparent distress  HEAD: Normocephalic, atraumatic  EYES: No sclera icterus, pupils equal  LUNGS:  No increased work of breathing  CARDIOVASCULAR:  RR  ABDOMEN:  Soft, non-tender, non-distended  EXTREMITIES: No edema or swelling  SKIN: Warm and dry    Assessment/Plan:  74 y.o. female with dysphagia, friable tissue vs mass at GE junction, near total occlusion s/p EGD 8/21. CT chest concerning for malignancy    - Recommend transfer to transplant center given complex history and likely malignancy. She is going to need enteral access and further work up that is complicated by her immunosuppressants. - Continue CLD  - Recommend crushing any pills that patient needs to take      Plan to be dicussed with Dr. James Vicente      Electronically signed by Fish Sung MD on 8/23/2022 at 8:01 AM       Attending Physician Statement:  I have examined the patient, reviewed the record, and discussed the case with the surgical team.  I agree with the assessment and plan with the following additions, corrections, and changes. Discussed transfer to [facility] with patient. She is agreeable. I have initiated that process with [facility]. Will update as available.

## 2022-08-23 NOTE — PROGRESS NOTES
Physical Therapy  Physical Therapy Initial Assessment       Name: Santana Gordon  : 1955  MRN: [id number]      Date of Service: 2022    Evaluating PT:  Dennie Escort PT, DPT  [id number]    Room #:  2587/0556-T  Diagnosis:  Dysphagia [R13.10]  Dysphagia, unspecified type [R13.10]  PMHx/PSHx:   has a past medical history of Acute on chronic respiratory failure with hypoxia ([address] Anemia, Cardiomegaly, CHF (congestive heart failure) ([address] Chronic kidney disease, Constipation, Diaphragmatic hernia, Diverticulitis, Epilepsy ([address] Falls, GERD (gastroesophageal reflux disease), Hemiparesis ([address] Hemiplegia ([address] Hemodialysis patient ([address] History of blood transfusion, History of lung transplant ([address] Hyperparathyroidism ([address] Hypertension, Immunosuppressed status ([address] Insomnia, IPF (idiopathic pulmonary fibrosis) ([address] Kidney stone, Neutropenia ([address] 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 Procedure/Surgery:    Precautions:  Falls  Equipment Needs:  TBD    SUBJECTIVE:    Pt admitted from SNF. Prior to SNF. Pt lived with spouse in 1 story home with ramped entrance. Pt ambulated with rollator independently. Equipment Owned:     OBJECTIVE:   Initial Evaluation  Date: 22 Treatment Short Term/ Long Term   Goals   AM-PAC 6 Clicks 85/85     Was pt agreeable to Eval/treatment? Yes     Does pt have pain? No c/o pain     Bed Mobility  Rolling: ModA  Supine to sit: MaxA  Sit to supine: NT  Scooting: ModA  Rolling: Independent    Supine to sit:  Independent    Sit to supine: Independent    Scooting: Independent     Transfers Sit to stand: ModA  Stand to sit: ModA  Stand pivot: ModA [address]  Sit to stand: Modified Independent    Stand to sit: Modified Independent    Stand pivot: Modified Independent     Ambulation    3 feet with ModA [address]   >150 feet with Modified Independent  AAD    Stair negotiation: ascended and descended  NT  Not a goal of care   ROM BUE:  Defer to OT  BLE:  WFL     Strength BUE:  Defer to OT  BLE:  3+/5  Improve 1 MMT   Balance Sitting EOB:  Priscila  Dynamic Standing:  MaxA [employer]  Sitting EOB:  Independent    Dynamic Standing:  Modified Independent       Pt is A & O x 4  Sensation:  WNL  Edema: WNL    Vitals:      Spo2 97%  PRE    Spo2 95%   ACTIVITY  /90 seated   ACTIVITY    Spo2 95%  POST      Therapeutic Exercises:  Functional mobility    Patient education  Pt educated on role of PT    Patient response to education:   Pt verbalized understanding Pt demonstrated skill Pt requires further education in this area   x x x     ASSESSMENT:    Conditions Requiring Skilled Therapeutic Intervention:    [x]Decreased strength     [x]Decreased ROM  [x]Decreased functional mobility  [x]Decreased balance   [x]Decreased endurance   []Decreased posture  []Decreased sensation  []Decreased coordination   []Decreased vision  [x]Decreased safety awareness   [x]Increased pain       Comments:  Pt agreeable to PT evaluation. Pt performing bed mobility with assist and cues. Pt requiring assistance and cues to improve stability at EOB. Pt performing functional transfers with assist and cues. Pt ambulation distance limited by fatigue. Patient would benefit from continued skilled PT to maximize functional mobility independence. Treatment:  Patient practiced and was instructed in the following treatment:    Bed mobility- verbal cues to facilitate independence  Functional transfers-Verbal cues for proper positioning and sequencing to perform transfers safely with maximum independence. Gait training-Verbal cues for proper positioning and sequencing using assistive device to maximize functional mobility independence. Pt's/ family goals   1. Get better    Prognosis is good for reaching above PT goals.     Patient and or family understand(s) diagnosis, prognosis, and plan of care.  yes    PHYSICAL THERAPY PLAN OF CARE:    PT POC is established based on physician order and patient diagnosis     Referring provider/PT Order:    08/19/22 1830  PT evaluation and treat  Start:  08/19/22 1830,   End:  08/19/22 1830,   ONE TIME,   Standing Count:  1 Occurrences,   R        Last continued at transfer on Sun Aug 21, 2022  4:17 PM    George Bedoya DO     Diagnosis:  Dysphagia [R13.10]  Dysphagia, unspecified type [R13.10]  Specific instructions for next treatment:  Transfer and gait training    Current Treatment Recommendations:     [x] Strengthening to improve independence with functional mobility   [x] ROM to improve independence with functional mobility   [x] Balance Training to improve static/dynamic balance and to reduce fall risk  [x] Endurance Training to improve activity tolerance during functional mobility   [x] Transfer Training to improve safety and independence with all functional transfers   [x] Gait Training to improve gait mechanics, endurance and assess need for appropriate assistive device  [] Stair Training in preparation for safe discharge home and/or into the community   [x] Positioning to prevent skin breakdown and contractures  [x] Safety and Education Training   [x] Patient/Caregiver Education   [x] HEP  [] Other     PT long term treatment goals are located in above grid    Frequency of treatments: 2-5x/week x 1-2 weeks. Time in  1014  Time out  1034    Total Treatment Time  8 minutes     Evaluation Time includes thorough review of current medical information, gathering information on past medical history/social history and prior level of function, completion of standardized testing/informal observation of tasks, assessment of data and education on plan of care and goals.     CPT codes:  [x] Low Complexity PT evaluation 21560  [] Moderate Complexity PT evaluation 25060  [] High Complexity PT evaluation 58833  [] PT Re-evaluation 24476  [] Gait training 49283 0 minutes  [] Manual therapy 48282 0 minutes  [x] Therapeutic activities 65699 8 minutes  [] Therapeutic exercises 97981 0 minutes  [] Neuromuscular reeducation 59029 0 minutes       Paramjit Tao PT, DPT   [id number]

## 2022-08-23 NOTE — PROGRESS NOTES
[facility]                                Progress note    Subjective: The patient is awake and slow to respond sitting up in bed with family at bedside  No acute events overnight. Denies chest pain, angina, SOB     Objective:    /79   Pulse 100   Temp 98.3 °F (36.8 °C)   Resp 19   Ht 5' (1.524 m)   Wt 120 lb 1 oz (54.5 kg)   SpO2 96%   BMI 23.45 kg/m²     In: 10 [P.O.:10]  Out: -   In: 10   Out: -     General appearance: NAD, conversant  HEENT: AT/NC, MMM  Neck: FROM, supple  Lungs: Clear to auscultation  CV: RRR, no MRGs  Vasc: Radial pulses 2+  Abdomen: Soft, non-tender; no masses or HSM  Extremities: No peripheral edema or digital cyanosis  Skin: no rash, lesions or ulcers  Psych: Alert and oriented to person, place and time  Neuro: Alert and interactive     Recent Labs     08/21/22  0959 08/22/22  0436 08/23/22  0435   WBC 11.3 9.8 8.8   HGB 8.4* 9.5* 9.2*   HCT 27.6* 31.2* 28.6*    327 317       Recent Labs     08/21/22  0959 08/22/22  0436 08/22/22  1455 08/23/22  0435    136  --  139   K 3.4* 5.4* 4.1 3.4*    99  --  98   CO2 22 21*  --  27   BUN 8 11  --  9   CREATININE 0.6 0.5  --  0.5   CALCIUM 8.3* 8.5*  --  8.4*       Assessment:    Principal Problem:    Dysphagia  Active Problems:    Severe protein-calorie malnutrition (HCC)    Esophageal obstruction  Resolved Problems:    * No resolved hospital problems.  *      Plan:  Patient is a 81-year-old female admitted to [place] [address] for  Dysphagia    -Monitor labs  -Consult SLP  -MBS > passed, continue regular diet  -General surgery evaluation for EGD due to concerning symptoms  EGD - Dysphagia; large amount of retained barium, friable tissue vs mass at GE junction with near-total occlusion  CLD today  CT chest to evaluate mass - soft tissue thickening or mass concern for malignancy  Consult hematology oncology pending biopsy results initiation of chemo,  Await final plans from general surgery regarding PEG tube/TPN for nutrition esophagectomy versus chemoradiation    General surgery is recommended to transfer to transplant center given complex history and likely malignancy. Patient is going to need enteral access and further work-up that is complicated by her immunosuppressants.-Discussed with surgical resident-patient to be transferred to higher level of care for surgical intervention by surgery team since this is primarily a surgical issue for which they are recommending transfer     Hypokalemia -   -Monitor labs  -K+ 3.4, replace, recheck in a.m. Hypomagnesemia - resolved  -Mg+ 1.4, replace, recheck in a.m. History of lung and kidney transplant  -Continue antirejection meds       DVT Prophylaxis   PT/OT  Discharge planning     ESTRELLA Moreno CNP  1:59 PM  8/23/2022     Above note edited to reflect my thoughts     I personally saw, examined and provided care for the patient. Radiographs, labs and medication list were reviewed by me independently. The case was discussed in detail and plans for care were established. Review of Reva DEE CNP, documentation was conducted and revisions were made as appropriate directly by me. I agree with the above documented exam, problem list, and plan of care.      Anu Grande MD  5:42 PM  8/23/2022

## 2022-08-24 LAB
ANION GAP SERPL CALCULATED.3IONS-SCNC: 12 MMOL/L (ref 7–16)
BUN BLDV-MCNC: 11 MG/DL (ref 6–23)
CALCIUM SERPL-MCNC: 8.6 MG/DL (ref 8.6–10.2)
CHLORIDE BLD-SCNC: 96 MMOL/L (ref 98–107)
CO2: 30 MMOL/L (ref 22–29)
CREAT SERPL-MCNC: 0.5 MG/DL (ref 0.5–1)
GFR AFRICAN AMERICAN: >60
GFR NON-AFRICAN AMERICAN: >60 ML/MIN/1.73
GLUCOSE BLD-MCNC: 107 MG/DL (ref 74–99)
HCT VFR BLD CALC: 32.3 % (ref 34–48)
HEMOGLOBIN: 10.2 G/DL (ref 11.5–15.5)
MCH RBC QN AUTO: 32.8 PG (ref 26–35)
MCHC RBC AUTO-ENTMCNC: 31.6 % (ref 32–34.5)
MCV RBC AUTO: 103.9 FL (ref 80–99.9)
PDW BLD-RTO: 13.4 FL (ref 11.5–15)
PLATELET # BLD: 302 E9/L (ref 130–450)
PMV BLD AUTO: 9.4 FL (ref 7–12)
POTASSIUM SERPL-SCNC: 3.2 MMOL/L (ref 3.5–5)
RBC # BLD: 3.11 E12/L (ref 3.5–5.5)
SODIUM BLD-SCNC: 138 MMOL/L (ref 132–146)
WBC # BLD: 7.6 E9/L (ref 4.5–11.5)

## 2022-08-24 PROCEDURE — 97535 SELF CARE MNGMENT TRAINING: CPT

## 2022-08-24 PROCEDURE — 97530 THERAPEUTIC ACTIVITIES: CPT

## 2022-08-24 PROCEDURE — 85027 COMPLETE CBC AUTOMATED: CPT

## 2022-08-24 PROCEDURE — 80048 BASIC METABOLIC PNL TOTAL CA: CPT

## 2022-08-24 PROCEDURE — 6370000000 HC RX 637 (ALT 250 FOR IP): Performed by: SURGERY

## 2022-08-24 PROCEDURE — 6360000002 HC RX W HCPCS: Performed by: STUDENT IN AN ORGANIZED HEALTH CARE EDUCATION/TRAINING PROGRAM

## 2022-08-24 PROCEDURE — 6360000002 HC RX W HCPCS: Performed by: INTERNAL MEDICINE

## 2022-08-24 PROCEDURE — 2580000003 HC RX 258: Performed by: STUDENT IN AN ORGANIZED HEALTH CARE EDUCATION/TRAINING PROGRAM

## 2022-08-24 PROCEDURE — 36415 COLL VENOUS BLD VENIPUNCTURE: CPT

## 2022-08-24 PROCEDURE — 6370000000 HC RX 637 (ALT 250 FOR IP): Performed by: INTERNAL MEDICINE

## 2022-08-24 PROCEDURE — 99232 SBSQ HOSP IP/OBS MODERATE 35: CPT | Performed by: SURGERY

## 2022-08-24 PROCEDURE — 93005 ELECTROCARDIOGRAM TRACING: CPT | Performed by: EMERGENCY MEDICINE

## 2022-08-24 PROCEDURE — 2500000003 HC RX 250 WO HCPCS: Performed by: STUDENT IN AN ORGANIZED HEALTH CARE EDUCATION/TRAINING PROGRAM

## 2022-08-24 PROCEDURE — C9113 INJ PANTOPRAZOLE SODIUM, VIA: HCPCS | Performed by: STUDENT IN AN ORGANIZED HEALTH CARE EDUCATION/TRAINING PROGRAM

## 2022-08-24 PROCEDURE — 6370000000 HC RX 637 (ALT 250 FOR IP): Performed by: STUDENT IN AN ORGANIZED HEALTH CARE EDUCATION/TRAINING PROGRAM

## 2022-08-24 PROCEDURE — 1200000000 HC SEMI PRIVATE

## 2022-08-24 PROCEDURE — A4216 STERILE WATER/SALINE, 10 ML: HCPCS | Performed by: STUDENT IN AN ORGANIZED HEALTH CARE EDUCATION/TRAINING PROGRAM

## 2022-08-24 RX ORDER — LEVETIRACETAM 10 MG/ML
1000 INJECTION INTRAVASCULAR NIGHTLY
Status: DISCONTINUED | OUTPATIENT
Start: 2022-08-24 | End: 2022-08-25

## 2022-08-24 RX ORDER — LEVETIRACETAM 15 MG/ML
1500 INJECTION INTRAVASCULAR DAILY
Status: DISCONTINUED | OUTPATIENT
Start: 2022-08-24 | End: 2022-08-24

## 2022-08-24 RX ORDER — LEVETIRACETAM 100 MG/ML
1500 SOLUTION ORAL DAILY
Status: DISCONTINUED | OUTPATIENT
Start: 2022-08-25 | End: 2022-08-24

## 2022-08-24 RX ORDER — LEVETIRACETAM 15 MG/ML
1500 INJECTION INTRAVASCULAR DAILY
Status: DISCONTINUED | OUTPATIENT
Start: 2022-08-25 | End: 2022-08-24

## 2022-08-24 RX ORDER — LEVETIRACETAM 15 MG/ML
1500 INJECTION INTRAVASCULAR DAILY
Status: DISCONTINUED | OUTPATIENT
Start: 2022-08-24 | End: 2022-08-25

## 2022-08-24 RX ORDER — LORAZEPAM 2 MG/ML
1 INJECTION INTRAMUSCULAR
Status: DISCONTINUED | OUTPATIENT
Start: 2022-08-24 | End: 2022-08-30 | Stop reason: HOSPADM

## 2022-08-24 RX ADMIN — ENOXAPARIN SODIUM 40 MG: 100 INJECTION SUBCUTANEOUS at 08:43

## 2022-08-24 RX ADMIN — ONDANSETRON 4 MG: 4 TABLET, ORALLY DISINTEGRATING ORAL at 09:07

## 2022-08-24 RX ADMIN — LEVETIRACETAM 1500 MG: 100 SOLUTION ORAL at 08:46

## 2022-08-24 RX ADMIN — Medication 10 ML: at 20:58

## 2022-08-24 RX ADMIN — AMLODIPINE BESYLATE 10 MG: 10 TABLET ORAL at 08:44

## 2022-08-24 RX ADMIN — SODIUM CHLORIDE, PRESERVATIVE FREE 40 MG: 5 INJECTION INTRAVENOUS at 20:57

## 2022-08-24 RX ADMIN — LEVETIRACETAM 1500 MG: 15 INJECTION INTRAVENOUS at 11:38

## 2022-08-24 RX ADMIN — TACROLIMUS 3 MG: 1 CAPSULE ORAL at 11:38

## 2022-08-24 RX ADMIN — TACROLIMUS 3 MG: 1 CAPSULE ORAL at 06:43

## 2022-08-24 RX ADMIN — SODIUM CHLORIDE, PRESERVATIVE FREE 40 MG: 5 INJECTION INTRAVENOUS at 11:36

## 2022-08-24 RX ADMIN — PRIMIDONE 150 MG: 50 TABLET ORAL at 08:44

## 2022-08-24 RX ADMIN — LEVETIRACETAM 1000 MG: 10 INJECTION INTRAVENOUS at 21:06

## 2022-08-24 RX ADMIN — METOPROLOL TARTRATE 2.5 MG: 1 INJECTION, SOLUTION INTRAVENOUS at 11:37

## 2022-08-24 RX ADMIN — POTASSIUM BICARBONATE 40 MEQ: 782 TABLET, EFFERVESCENT ORAL at 15:47

## 2022-08-24 ASSESSMENT — PAIN SCALES - WONG BAKER: WONGBAKER_NUMERICALRESPONSE: 0

## 2022-08-24 ASSESSMENT — PAIN SCALES - GENERAL
PAINLEVEL_OUTOF10: 0
PAINLEVEL_OUTOF10: 0

## 2022-08-24 NOTE — PROGRESS NOTES
RN assessed pt after shift change and was told by daughter in room pt had another seizure about fifteen minutes prior to coming into room. Daughter stated she believed seizure lasted about one minute. VS taken. HR elevated at 126, /84. Neuro check post seizure done, pt alert and oriented and at baseline. Values charted. NP for Dr. Ashwin Wu notified.

## 2022-08-24 NOTE — PLAN OF CARE
Problem: Skin/Tissue Integrity  Goal: Absence of new skin breakdown  Description: 1. Monitor for areas of redness and/or skin breakdown  2. Assess vascular access sites hourly  3. Every 4-6 hours minimum:  Change oxygen saturation probe site  4. Every 4-6 hours:  If on nasal continuous positive airway pressure, respiratory therapy assess nares and determine need for appliance change or resting period.   Outcome: Progressing     Problem: Safety - Adult  Goal: Free from fall injury  Outcome: Progressing     Problem: ABCDS Injury Assessment  Goal: Absence of physical injury  Outcome: Progressing     Problem: Chronic Conditions and Co-morbidities  Goal: Patient's chronic conditions and co-morbidity symptoms are monitored and maintained or improved  Outcome: Progressing     Problem: Nutrition Deficit:  Goal: Optimize nutritional status  Outcome: Progressing     Problem: Discharge Planning  Goal: Discharge to home or other facility with appropriate resources  Outcome: Progressing     Problem: Pain  Goal: Verbalizes/displays adequate comfort level or baseline comfort level  Outcome: Progressing     Problem: Cardiovascular - Adult  Goal: Maintains optimal cardiac output and hemodynamic stability  Outcome: Progressing     Problem: Metabolic/Fluid and Electrolytes - Adult  Goal: Hemodynamic stability and optimal renal function maintained  Outcome: Progressing

## 2022-08-24 NOTE — PROGRESS NOTES
GENERAL SURGERY  DAILY PROGRESS NOTE  8/24/2022  Chief Complaint   Patient presents with    Dysphagia     Sent from NH for difficulty swallowing and malnutrition. Subjective:  No new events. Patient is tolerating clear liquids overall. She is able to swallow her pills. Awaiting transfer to [facility], no bed ready as of 11PM last night. Objective:  /81   Pulse (!) 107   Temp 98.8 °F (37.1 °C) (Oral)   Resp 20   Ht 5' (1.524 m)   Wt 114 lb 14.4 oz (52.1 kg)   SpO2 97%   BMI 22.44 kg/m²     General Appearance:  awake, alert, conversant  Skin:  Skin color and texture normal, turgor poor  Head/face:  NCAT  Lungs/Chest:  Normal expansion. No respiratory distress. On room air  Heart: Warm throughout. Borderline tachycardia  Abdomen:  Soft, mild tenderness, non distended. Extremities: Extremities warm to touch, pink. RUE fistula with palpable thrill    Assessment/Plan:  74 y.o. female with dysphagia, friable tissue vs mass at GE junction, near total occlusion s/p EGD 8/21. CT chest concerning for malignancy    - Transfer to transplant center in progress. Awaiting bed  - Given complex history and likely malignancy, he is going to need enteral access and further work up that is complicated by her immunosuppressants  - awaiting biopsy results  - Continue CLD  - Recommend crushing any pills that patient needs to take      Electronically signed by Martita Thorpe DO on 8/24/2022 at 6:11 AM       Attending Physician Statement:  I have examined the patient, reviewed the record, and discussed the case with the surgical team.  I agree with the assessment and plan with the following additions, corrections, and changes. Not feeling well today. No specific complaints. Spoke with her  at bedside today. Will replace potassium as well.     Electronically signed by Rayna Harmon MD on 8/24/22 at 10:51 AM EDT

## 2022-08-24 NOTE — PROGRESS NOTES
IV team at bedside to place new access. Patient noted to have approximately 30 seconds of seizure activity. Vital signs taken and MD informed by charge RN. Writer to hang IV keppra once med available and site acquired.

## 2022-08-24 NOTE — PROGRESS NOTES
Physical Therapy  Treatment Note      Name: Amanda Concepcion  : 1955  MRN: [id number]      Date of Service: 2022    Evaluating PT:  Breana Hitchcock PT, DPT  [id number]    Room #:  2411/4496-Z  Diagnosis:  Dysphagia [R13.10]  Dysphagia, unspecified type [R13.10]  PMHx/PSHx:   has a past medical history of Acute on chronic respiratory failure with hypoxia ([address] Anemia, Cardiomegaly, CHF (congestive heart failure) ([address] Chronic kidney disease, Constipation, Diaphragmatic hernia, Diverticulitis, Epilepsy ([address] Falls, GERD (gastroesophageal reflux disease), Hemiparesis ([address] Hemiplegia ([address] Hemodialysis patient ([address] History of blood transfusion, History of lung transplant ([address] Hyperparathyroidism ([address] Hypertension, Immunosuppressed status ([address] Insomnia, IPF (idiopathic pulmonary fibrosis) ([address] Kidney stone, Neutropenia ([address] 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 Procedure/Surgery:    Precautions:  Falls  Equipment Needs:  TBD    SUBJECTIVE:    Pt admitted from SNF. Prior to SNF. Pt lived with spouse in 1 story home with ramped entrance. Pt ambulated with rollator independently. Equipment Owned:     OBJECTIVE:   Initial Evaluation  Date: 22 Treatment 22 Short Term/ Long Term   Goals   AM-PAC 6 Clicks     Was pt agreeable to Eval/treatment? Yes yes    Does pt have pain? No c/o pain Mild- moderate pain in L wrist    Bed Mobility  Rolling: ModA  Supine to sit: MaxA  Sit to supine: NT  Scooting: ModA Rolling: MaxA  Supine to sit: MaxA  Sit to supine: MaxA  Scooting: MaxA Rolling: Independent    Supine to sit:  Independent    Sit to supine: Independent    Scooting: Independent     Transfers Sit to stand: ModA  Stand to sit: ModA  Stand pivot: ModA [employer] Sit to stand: NT  Stand to sit: NT  Stand pivot: NT Sit to stand: Modified Independent    Stand to sit: Modified Independent    Stand pivot: Modified Independent     Ambulation    3 feet with ModA [employer]   >150 feet with Modified Independent  AAD    Stair negotiation: ascended and descended  NT  Not a goal of care   ROM BUE:  Defer to OT  BLE:  WFL     Strength BUE:  Defer to OT  BLE:  3+/5  Improve 1 MMT   Balance Sitting EOB:  Priscila  Dynamic Standing:  MaxA [employer] Sitting EOB:  MaxA  Dynamic Standing:  NT Sitting EOB:  Independent    Dynamic Standing:  Modified Independent       Pt is A & O x 4  Sensation:  WNL  Edema:  WNL      Therapeutic Exercises:  Functional mobility    Patient education  Pt educated on role of PT    Patient response to education:   Pt verbalized understanding Pt demonstrated skill Pt requires further education in this area   x x x     ASSESSMENT:    Conditions Requiring Skilled Therapeutic Intervention:    [x]Decreased strength     [x]Decreased ROM  [x]Decreased functional mobility  [x]Decreased balance   [x]Decreased endurance   []Decreased posture  []Decreased sensation  []Decreased coordination   []Decreased vision  [x]Decreased safety awareness   [x]Increased pain       Comments: Pt agreeable to PT. Pt reporting nausea this date, and demonstrated increased overall weakness. Pt performing bed mobility with assist and cues. Pt sitting statically EOB with assistance as needed. Cues for improved stability. Treatment:  Patient practiced and was instructed in the following treatment:    Bed mobility- verbal cues to facilitate independence  Neuromuscular reedcuation - verbal cues to improve stability. ~ 10 min      Pt's/ family goals   1. Get better    Prognosis is good for reaching above PT goals. Patient and or family understand(s) diagnosis, prognosis, and plan of care.   yes    PHYSICAL THERAPY PLAN OF CARE:    PT POC is established based on physician order and patient diagnosis     Referring provider/PT Order:    08/19/22 1830  PT evaluation and treat  Start:  08/19/22 1830,   End:  08/19/22 1830,   ONE TIME,   Standing Count:  1 Occurrences,   R        Last continued at transfer on Sun Aug 21, 2022  4:17 PM    Racquel Bigg      Diagnosis:  Dysphagia [R13.10]  Dysphagia, unspecified type [R13.10]  Specific instructions for next treatment:  Transfer and gait training    Current Treatment Recommendations:     [x] Strengthening to improve independence with functional mobility   [x] ROM to improve independence with functional mobility   [x] Balance Training to improve static/dynamic balance and to reduce fall risk  [x] Endurance Training to improve activity tolerance during functional mobility   [x] Transfer Training to improve safety and independence with all functional transfers   [x] Gait Training to improve gait mechanics, endurance and assess need for appropriate assistive device  [] Stair Training in preparation for safe discharge home and/or into the community   [x] Positioning to prevent skin breakdown and contractures  [x] Safety and Education Training   [x] Patient/Caregiver Education   [x] HEP  [] Other     PT long term treatment goals are located in above grid    Frequency of treatments: 2-5x/week x 1-2 weeks. Time in  1337  Time out  1400    Total Treatment Time  23 minutes     Evaluation Time includes thorough review of current medical information, gathering information on past medical history/social history and prior level of function, completion of standardized testing/informal observation of tasks, assessment of data and education on plan of care and goals.     CPT codes:  [] Low Complexity PT evaluation 44358  [] Moderate Complexity PT evaluation 60459  [] High Complexity PT evaluation 38975  [] PT Re-evaluation 06576  [] Gait training 12667 0 minutes  [] Manual therapy 37695 0 minutes  [x] Therapeutic activities 29166 23 minutes  [] Therapeutic exercises 22174 0 minutes  [] Neuromuscular reeducation 50038 0 minutes       Li Chin PT, DPT   [id number]

## 2022-08-24 NOTE — CARE COORDINATION
Social Work/Case Management Transition of Care Planning Jenny Travis Holden [phone]):   Per report, [facility] was contacted this morning and they still do not have a bed available. Discharge plan remains to transfer to [facility] when a bed becomes available. Discharge envelope with ambulance form in soft chart.   SANDRA Couch  8/24/2022

## 2022-08-24 NOTE — PROGRESS NOTES
Dr. Shyla Ryan notified of 30 sec seizure , nurses at bedside when this happened vitals taken, tachy and bp high at the time will recheck. IV put in so IV keppra can be given, patient is alert and answering questions, per bedside nurse neuro assessment shows no changes.

## 2022-08-24 NOTE — PROGRESS NOTES
OT BEDSIDE TREATMENT NOTE     [facility] [address] [place] [address]      Date:2022  Patient Name: Yoselin Yen  MRN: [id number]  : 1955  Room: [facility]9149     Per OT Eval:     Evaluating [facility], OTR/L #[id number]     Referring Provider: Rishi Vogel DO  Specific Provider Orders/Date: OT eval and treat 22      Diagnosis: Dysphagia [R13.10]  Dysphagia, unspecified type [R13.10]   Pt admitted to hospital on 22 for dysphagia, vomiting     Surgery / Procedure: EGD FOREIGN BODY REMOVAL WITH BIOPSY AND EPINEPHRINE APPLICATION on       Pertinent Medical History:  has a past medical history of Acute on chronic respiratory failure with hypoxia ([address] Anemia, Cardiomegaly, CHF (congestive heart failure) ([address] Chronic kidney disease, Constipation, Diaphragmatic hernia, Diverticulitis, Epilepsy ([address] Falls, GERD (gastroesophageal reflux disease), Hemiparesis ([address] Hemiplegia ([address] Hemodialysis patient ([address] History of blood transfusion, History of lung transplant ([address] Hyperparathyroidism ([address] Hypertension, Immunosuppressed status ([address] Insomnia, IPF (idiopathic pulmonary fibrosis) ([address] Kidney stone, Neutropenia ([address] 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          Precautions:  Fall Risk, clear liquid diet, bed alarm     Assessment of current deficits    [x] Functional mobility         [x]ADLs           [x] Strength                  [x]Cognition    [x] Functional transfers       [x] IADLs         [x] Safety Awareness   [x]Endurance    [] Fine Coordination                      [x] Balance      [] Vision/perception   []Sensation      []Gross Motor Coordination          [] ROM           [] Delirium                   [] Motor Control      OT PLAN OF CARE   OT POC based on physician orders, patient diagnosis and results of clinical assessment     Frequency/Duration 1-3 days/wk for 2 weeks PRN   Specific OT Treatment Interventions to include:   * Instruction/training on adapted ADL techniques and AE recommendations to increase functional independence within precautions       * Training on energy conservation strategies, correct breathing pattern and techniques to improve independence/tolerance for self-care routine  * Functional transfer/mobility training/DME recommendations for increased independence, safety, and fall prevention  * Patient/Family education to increase follow through with safety techniques and functional independence  * Recommendation of environmental modifications for increased safety with functional transfers/mobility and ADLs  * Cognitive retraining/development of therapeutic activities to improve problem solving, judgement, memory, and attention for increased safety/participation in ADL/IADL tasks  * Therapeutic exercise to improve motor endurance, ROM, and functional strength for ADLs/functional transfers  * Therapeutic activities to facilitate/challenge dynamic balance, stand tolerance for increased safety and independence with ADLs  * Therapeutic activities to facilitate gross/fine motor skills for increased independence with ADLs     Recommended Adaptive Equipment: TBD      Home Living: Pt admitted from [facility]  Pt lives with spouse in 1 floor home. Ramped entry   Equipment owned: rollator     Prior Level of Function: independent/mod I with ADLs; ambulated w/ rollator  At [facility], assist for ADL's and ambulation was provided per spouse.      Pain Level: Pt c/o no pain this session      Cognition: A&O: 3/4; Follows 1 step directions  Delayed processing speed           Memory:  fair  (spouse assisted w/ providing PLOF)           Sequencing:  fair            Problem solving:  fair -           Judgement/safety:  fair -             Functional Assessment:  AM-PAC Daily Activity Raw Score: 14/24 Initial Eval Status  Date: 8/23/22 Treatment Status  Date: 8/24/22 STGs = LTGs  Time frame: 10-14 days   Feeding Stand by Assist  Minimal Assist   Opening packages/tray set up Modified [place]    Grooming Minimal Assist   To comb hair seated in chair  Washed face and hands while seated EOB  Minimal Assist  Washing face seated EOB Modified [place]    UB Dressing Minimal Assist   Donned/doffed gown  Moderate Assist   Lying supine d/t decreased activity tolerance and dynamic sitting tolerance seated EOB Modified [place]    LB Dressing Dependent  Dependent   Osvaldo socks lying supine  Minimal Assist    Bathing Maximal Assist  Maximal Assist  Decreased functional reach d/t poor dynamic sitting balance    Required assist for UB/LB bathing task lying supine unable to tolerate activity seated OEB Minimal Assist    Toileting Maximal Assist   Maximal Assist   Unable to ambulate to bathroom this date d/t fatigue Minimal Assist    Bed Mobility  Supine to sit: Maximal Assist   Sit to supine: NT  Supine to sit: Maximal Assist   Sit to supine: Maximal Assist  Supine to sit: Minimal Assist   Sit to supine: Minimal Assist    Functional Transfers Moderate Assist  NT   Unable to ambulate this date d/t overall weakness and nausea Supervision   Functional Mobility Moderate Assist w/ w/w  Steps from EOB>bedside chair NT    Supervision   Balance Sitting:     Static:  Min A    Dynamic:Min A  Standing:  Mod A w/ w/w Sitting:     Static:  Mod A <> Min A    Dynamic: Mod A  Standing: NT     Activity Tolerance Fair-  C/o fatigue w/ minimal activity  POOR   Decreased activity tolerance d/t overall fatigue with light ADLs and weakness    RUE pain (wrist/elbow) - sensitive to touch and all movement  Noted bruising at loctions Fair+   Visual/  Perceptual Glasses: yes                          Hand Dominance R    AROM (PROM) Strength Additional Info:    RUE  WFL 3+/5 good  and wfl FMC/dexterity noted during ADL tasks         SHELLEY Damon/[facility] 3+/5 good  and wfl FMC/dexterity noted during ADL tasks         Hearing: [facility]   Sensation:  No c/o numbness or tingling   Tone: WFL   Edema: none noted      Comments: Upon arrival pt lying supine in bed. Pt educated  through out treatment regarding proper technique & safety with bed mobility, functional transfers & mobility, walker safety & completion of ADL tasks with modified techniques to improve independence, safety, prevent falls and allow pt to return to prior level of function. OT will continue with POC with focus on increasing independence on ADLs. At end of session, pt lying supine in bed with all lines and tubes intact, call light within reach. Pt has made FAIR progress towards set goals.    Continue with current plan of care      Treatment Time In: 1430            Treatment Time Out: 4475               Treatment Charges: Mins Units   Ther Ex  61442     Manual Therapy [id number]     Thera Activities 74588 10 1   ADL/Home Mgt 17155 15 1   Neuro Re-ed 58149     Group Therapy      Orthotic manage/training  05889     Non-Billable Time     Total Timed Treatment 25 [address], [address], [address] [facility]

## 2022-08-24 NOTE — PROGRESS NOTES
Writer at bedside with patient taking vitals at 1819. Patient began having a seizure that lasted approximately 15 seconds. BP at the time of seizure was in process and noted to be low. Heart rate elevated. Patient recovered quickly post seizure and was alert and oriented. Neuro check noted patient return to baseline. BP checked again and noted to be back to normal range. Heart rate remained elevated. All values charted. NP for Dr. Ashwin Wu notified.   Writer also requested parameters for ordered lorazepam.

## 2022-08-24 NOTE — PROGRESS NOTES
pending biopsy results initiation of chemo,    Case discussed with Dr. Rousseau Flow arrangements for transfer that has been made to [facility] for further management/need for esophageal mass resection along with parenteral nutrition needs     Hypokalemia -   -Monitor labs  -K+ 3.2, replace, recheck in the AM      Hypomagnesemia - resolved  -Mg+ 1.4, replaced     History of lung and kidney transplant  -Continue antirejection meds       DVT Prophylaxis   PT/OT  Discharge planning     ESTRELLA Pinzon CNP  11:52 AM  8/24/2022     Above note edited to reflect my thoughts     I personally saw, examined and provided care for the patient. Radiographs, labs and medication list were reviewed by me independently. The case was discussed in detail and plans for care were established. Review of ELIZABETH Pinzon   , documentation was conducted and revisions were made as appropriate directly by me. I agree with the above documented exam, problem list, and plan of care.      Zahraa Rodgers MD  3:58 PM  8/24/2022

## 2022-08-25 PROBLEM — R56.9 SEIZURE (HCC): Status: ACTIVE | Noted: 2017-05-17

## 2022-08-25 LAB
BASOPHILS ABSOLUTE: 0.04 E9/L (ref 0–0.2)
BASOPHILS RELATIVE PERCENT: 0.5 % (ref 0–2)
EKG ATRIAL RATE: 111 BPM
EKG P AXIS: 49 DEGREES
EKG P-R INTERVAL: 134 MS
EKG Q-T INTERVAL: 350 MS
EKG QRS DURATION: 86 MS
EKG QTC CALCULATION (BAZETT): 476 MS
EKG R AXIS: 54 DEGREES
EKG T AXIS: -123 DEGREES
EKG VENTRICULAR RATE: 111 BPM
EOSINOPHILS ABSOLUTE: 0.12 E9/L (ref 0.05–0.5)
EOSINOPHILS RELATIVE PERCENT: 1.6 % (ref 0–6)
HCT VFR BLD CALC: 29.4 % (ref 34–48)
HEMOGLOBIN: 9.2 G/DL (ref 11.5–15.5)
IMMATURE GRANULOCYTES #: 0.06 E9/L
IMMATURE GRANULOCYTES %: 0.8 % (ref 0–5)
LYMPHOCYTES ABSOLUTE: 1.01 E9/L (ref 1.5–4)
LYMPHOCYTES RELATIVE PERCENT: 13.4 % (ref 20–42)
MCH RBC QN AUTO: 32.7 PG (ref 26–35)
MCHC RBC AUTO-ENTMCNC: 31.3 % (ref 32–34.5)
MCV RBC AUTO: 104.6 FL (ref 80–99.9)
METER GLUCOSE: 120 MG/DL (ref 74–99)
METER GLUCOSE: 130 MG/DL (ref 74–99)
MONOCYTES ABSOLUTE: 0.77 E9/L (ref 0.1–0.95)
MONOCYTES RELATIVE PERCENT: 10.2 % (ref 2–12)
NEUTROPHILS ABSOLUTE: 5.56 E9/L (ref 1.8–7.3)
NEUTROPHILS RELATIVE PERCENT: 73.5 % (ref 43–80)
PDW BLD-RTO: 13.4 FL (ref 11.5–15)
PLATELET # BLD: 245 E9/L (ref 130–450)
PMV BLD AUTO: 10.3 FL (ref 7–12)
RBC # BLD: 2.81 E12/L (ref 3.5–5.5)
WBC # BLD: 7.6 E9/L (ref 4.5–11.5)

## 2022-08-25 PROCEDURE — 76937 US GUIDE VASCULAR ACCESS: CPT

## 2022-08-25 PROCEDURE — 36415 COLL VENOUS BLD VENIPUNCTURE: CPT

## 2022-08-25 PROCEDURE — 99232 SBSQ HOSP IP/OBS MODERATE 35: CPT | Performed by: SURGERY

## 2022-08-25 PROCEDURE — 6370000000 HC RX 637 (ALT 250 FOR IP): Performed by: STUDENT IN AN ORGANIZED HEALTH CARE EDUCATION/TRAINING PROGRAM

## 2022-08-25 PROCEDURE — 6360000002 HC RX W HCPCS: Performed by: STUDENT IN AN ORGANIZED HEALTH CARE EDUCATION/TRAINING PROGRAM

## 2022-08-25 PROCEDURE — 05HY33Z INSERTION OF INFUSION DEVICE INTO UPPER VEIN, PERCUTANEOUS APPROACH: ICD-10-PCS | Performed by: INTERNAL MEDICINE

## 2022-08-25 PROCEDURE — 82962 GLUCOSE BLOOD TEST: CPT

## 2022-08-25 PROCEDURE — 99222 1ST HOSP IP/OBS MODERATE 55: CPT | Performed by: PHYSICIAN ASSISTANT

## 2022-08-25 PROCEDURE — 2580000003 HC RX 258: Performed by: PHYSICIAN ASSISTANT

## 2022-08-25 PROCEDURE — 2580000003 HC RX 258: Performed by: NURSE PRACTITIONER

## 2022-08-25 PROCEDURE — 2500000003 HC RX 250 WO HCPCS: Performed by: NURSE PRACTITIONER

## 2022-08-25 PROCEDURE — A4216 STERILE WATER/SALINE, 10 ML: HCPCS | Performed by: STUDENT IN AN ORGANIZED HEALTH CARE EDUCATION/TRAINING PROGRAM

## 2022-08-25 PROCEDURE — 2500000003 HC RX 250 WO HCPCS: Performed by: SURGERY

## 2022-08-25 PROCEDURE — 2580000003 HC RX 258: Performed by: STUDENT IN AN ORGANIZED HEALTH CARE EDUCATION/TRAINING PROGRAM

## 2022-08-25 PROCEDURE — 6360000002 HC RX W HCPCS: Performed by: PHYSICIAN ASSISTANT

## 2022-08-25 PROCEDURE — 36569 INSJ PICC 5 YR+ W/O IMAGING: CPT

## 2022-08-25 PROCEDURE — C1751 CATH, INF, PER/CENT/MIDLINE: HCPCS

## 2022-08-25 PROCEDURE — 85025 COMPLETE CBC W/AUTO DIFF WBC: CPT

## 2022-08-25 PROCEDURE — 6360000002 HC RX W HCPCS: Performed by: INTERNAL MEDICINE

## 2022-08-25 PROCEDURE — 2500000003 HC RX 250 WO HCPCS: Performed by: STUDENT IN AN ORGANIZED HEALTH CARE EDUCATION/TRAINING PROGRAM

## 2022-08-25 PROCEDURE — C9254 INJECTION, LACOSAMIDE: HCPCS | Performed by: PHYSICIAN ASSISTANT

## 2022-08-25 PROCEDURE — C9113 INJ PANTOPRAZOLE SODIUM, VIA: HCPCS | Performed by: STUDENT IN AN ORGANIZED HEALTH CARE EDUCATION/TRAINING PROGRAM

## 2022-08-25 PROCEDURE — 1200000000 HC SEMI PRIVATE

## 2022-08-25 PROCEDURE — 6360000002 HC RX W HCPCS: Performed by: NURSE PRACTITIONER

## 2022-08-25 RX ORDER — SODIUM CHLORIDE 0.9 % (FLUSH) 0.9 %
5-40 SYRINGE (ML) INJECTION EVERY 12 HOURS SCHEDULED
Status: DISCONTINUED | OUTPATIENT
Start: 2022-08-25 | End: 2022-08-30 | Stop reason: HOSPADM

## 2022-08-25 RX ORDER — DEXTROSE, SODIUM CHLORIDE, AND POTASSIUM CHLORIDE 5; .45; .3 G/100ML; G/100ML; G/100ML
INJECTION INTRAVENOUS CONTINUOUS
Status: DISCONTINUED | OUTPATIENT
Start: 2022-08-25 | End: 2022-08-26

## 2022-08-25 RX ORDER — LIDOCAINE HYDROCHLORIDE 10 MG/ML
5 INJECTION, SOLUTION EPIDURAL; INFILTRATION; INTRACAUDAL; PERINEURAL ONCE
Status: DISCONTINUED | OUTPATIENT
Start: 2022-08-25 | End: 2022-08-30 | Stop reason: HOSPADM

## 2022-08-25 RX ORDER — SODIUM CHLORIDE 0.9 % (FLUSH) 0.9 %
5-40 SYRINGE (ML) INJECTION PRN
Status: DISCONTINUED | OUTPATIENT
Start: 2022-08-25 | End: 2022-08-30 | Stop reason: HOSPADM

## 2022-08-25 RX ORDER — SODIUM CHLORIDE 9 MG/ML
INJECTION, SOLUTION INTRAVENOUS PRN
Status: DISCONTINUED | OUTPATIENT
Start: 2022-08-25 | End: 2022-08-30 | Stop reason: HOSPADM

## 2022-08-25 RX ORDER — HEPARIN SODIUM (PORCINE) LOCK FLUSH IV SOLN 100 UNIT/ML 100 UNIT/ML
3 SOLUTION INTRAVENOUS EVERY 12 HOURS SCHEDULED
Status: DISCONTINUED | OUTPATIENT
Start: 2022-08-25 | End: 2022-08-30 | Stop reason: HOSPADM

## 2022-08-25 RX ORDER — HEPARIN SODIUM (PORCINE) LOCK FLUSH IV SOLN 100 UNIT/ML 100 UNIT/ML
3 SOLUTION INTRAVENOUS PRN
Status: DISCONTINUED | OUTPATIENT
Start: 2022-08-25 | End: 2022-08-30 | Stop reason: HOSPADM

## 2022-08-25 RX ORDER — LEVETIRACETAM 15 MG/ML
1500 INJECTION INTRAVASCULAR 2 TIMES DAILY
Status: DISCONTINUED | OUTPATIENT
Start: 2022-08-25 | End: 2022-08-30 | Stop reason: HOSPADM

## 2022-08-25 RX ORDER — LEVETIRACETAM 5 MG/ML
500 INJECTION INTRAVASCULAR NIGHTLY
Status: DISCONTINUED | OUTPATIENT
Start: 2022-08-25 | End: 2022-08-30 | Stop reason: HOSPADM

## 2022-08-25 RX ADMIN — PRIMIDONE 150 MG: 50 TABLET ORAL at 08:44

## 2022-08-25 RX ADMIN — METOPROLOL TARTRATE 2.5 MG: 1 INJECTION, SOLUTION INTRAVENOUS at 02:44

## 2022-08-25 RX ADMIN — SODIUM CHLORIDE, PRESERVATIVE FREE 40 MG: 5 INJECTION INTRAVENOUS at 20:56

## 2022-08-25 RX ADMIN — SODIUM CHLORIDE, PRESERVATIVE FREE 40 MG: 5 INJECTION INTRAVENOUS at 10:37

## 2022-08-25 RX ADMIN — POTASSIUM CHLORIDE, DEXTROSE MONOHYDRATE AND SODIUM CHLORIDE: 300; 5; 450 INJECTION, SOLUTION INTRAVENOUS at 21:35

## 2022-08-25 RX ADMIN — POTASSIUM CHLORIDE, DEXTROSE MONOHYDRATE AND SODIUM CHLORIDE: 300; 5; 450 INJECTION, SOLUTION INTRAVENOUS at 17:43

## 2022-08-25 RX ADMIN — LEVETIRACETAM 1500 MG: 15 INJECTION INTRAVENOUS at 10:51

## 2022-08-25 RX ADMIN — POTASSIUM CHLORIDE, DEXTROSE MONOHYDRATE AND SODIUM CHLORIDE: 300; 5; 450 INJECTION, SOLUTION INTRAVENOUS at 11:24

## 2022-08-25 RX ADMIN — LEVETIRACETAM 500 MG: 5 INJECTION INTRAVENOUS at 21:25

## 2022-08-25 RX ADMIN — AMLODIPINE BESYLATE 10 MG: 10 TABLET ORAL at 08:44

## 2022-08-25 RX ADMIN — LACOSAMIDE 150 MG: 10 INJECTION INTRAVENOUS at 17:44

## 2022-08-25 RX ADMIN — Medication 10 ML: at 20:57

## 2022-08-25 RX ADMIN — METOPROLOL TARTRATE 2.5 MG: 1 INJECTION, SOLUTION INTRAVENOUS at 10:39

## 2022-08-25 RX ADMIN — TACROLIMUS 3 MG: 1 CAPSULE ORAL at 12:15

## 2022-08-25 RX ADMIN — Medication 10 ML: at 10:43

## 2022-08-25 RX ADMIN — Medication 300 UNITS: at 20:56

## 2022-08-25 RX ADMIN — CALCIUM GLUCONATE: 98 INJECTION, SOLUTION INTRAVENOUS at 18:37

## 2022-08-25 RX ADMIN — METOPROLOL TARTRATE 2.5 MG: 1 INJECTION, SOLUTION INTRAVENOUS at 18:32

## 2022-08-25 RX ADMIN — LEVETIRACETAM 1500 MG: 15 INJECTION INTRAVENOUS at 21:06

## 2022-08-25 RX ADMIN — ENOXAPARIN SODIUM 40 MG: 100 INJECTION SUBCUTANEOUS at 08:45

## 2022-08-25 ASSESSMENT — PAIN SCALES - GENERAL: PAINLEVEL_OUTOF10: 0

## 2022-08-25 NOTE — PROGRESS NOTES
Pt had 3 min seizure, two aides were present while seizure activity occurred, however RN was not. NP covering for Dr. Haily Barry notified. /90, 104 HR, 97% RA, 98.1 temp. Neuro check same as baseline, however pt is slightly more drowsy. Answering questions appropriately. Seizure precautions remain in place.

## 2022-08-25 NOTE — PROGRESS NOTES
GENERAL SURGERY  DAILY PROGRESS NOTE  8/25/2022  Chief Complaint   Patient presents with    Dysphagia     Sent from NH for difficulty swallowing and malnutrition. Subjective:  Seizures last night. Not feeling well today. Neurology consulted by hospital medicine. Objective:  /83   Pulse 100   Temp 98.1 °F (36.7 °C) (Oral)   Resp 18   Ht 5' (1.524 m)   Wt 123 lb 1 oz (55.8 kg)   SpO2 96%   BMI 24.03 kg/m²     General Appearance:  awake, alert, conversant  Skin:  Skin color and texture pale, turgor poor  Head/face:  NCAT  Lungs/Chest:  Normal expansion. No respiratory distress. On room air  Heart: Warm throughout. Borderline tachycardia  Abdomen:  Soft, mild tenderness, non distended. Extremities: Extremities warm to touch, pink. RUE fistula with palpable thrill    Assessment/Plan:  74 y.o. female with dysphagia, friable tissue vs mass at GE junction, near total occlusion s/p EGD 8/21. CT chest concerning for malignancy    - Transfer to transplant center in progress.  Awaiting bed  - Given complex history and likely malignancy, he is going to need enteral access and further work up that is complicated by her immunosuppressants  - awaiting biopsy results  - Continue CLD  - Recommend crushing any pills that patient needs to take  PICC - TPN to start today      Electronically signed by Ruth Quezada MD on 8/25/2022 at 11:53 AM Admitted

## 2022-08-25 NOTE — CARE COORDINATION
Social Work/Case Management Transition of Care Planning ([facility] [phone]):   Plan remains for patient to transfer to [facility] when a bed is available. Per report, TPN will be started and neurology was consulted due to seizure activity. Discharge envelope with ambulance form is in the soft chart.   SANDRA Llanos  8/25/2022

## 2022-08-25 NOTE — PROGRESS NOTES
Comprehensive Nutrition Assessment    Type and Reason for Visit:  Reassess, Consult (PN Rec's)    Nutrition Recommendations/Plan:   Continue Diet as tolerated. Will Modify Current ONS to TID. Recommend to Start TPN until EN access possible. PN Recommendations:  3-in-1 Central Standard @ 60ml/hr (Goal) Continuous x 24hr/d= 1440ml TV, 1490kcal, 72gm AA. Please consult for EN rec's once access obtained. Recommend to Start PN at half of goal rate and slowly advance to goal as tolerated. Malnutrition Assessment:  Malnutrition Status:  Severe malnutrition (08/21/22 1132)    Context:  Chronic Illness     Findings of the 6 clinical characteristics of malnutrition:  Energy Intake:  75% or less estimated energy requirements for 1 month or longer  Weight Loss:  Unable to assess (d/t fluctuations w/ CHF/CKD)     Body Fat Loss:  Severe body fat loss Orbital, Triceps, Fat Overlying Ribs, Buccal region   Muscle Mass Loss:  Severe muscle mass loss Temples (temporalis), Clavicles (pectoralis & deltoids), Hand (interosseous), Scapula (trapezius)  Fluid Accumulation:  No significant fluid accumulation     Strength:  Not Performed    Nutrition Assessment:    Pt admit from [facility]CF 2/2 dysphagia & poor PO intake PTA. Noted recent admit to [facility] w/ weakness/poor PO. Pt s/p MBS (WNL) 8/20, s/p EGD w/ Bx and foreign body removal 8/21 revealing retained barium and GE junction mass/near total occlusion concerning for malignancy. Noted severe malnutrition w/ hx pulmonary fibrosis, CKD (hx dialysis), CHF, Epilepsy, lung & kidney transplant. Remains at risk d/t ongoing minimal intake since adm d/t clear liquid diet only and ongoing poor appetite/weakness w/ noted multiple Sz's x past ~1-2d. Pend plan for [facility] trans bed pending, noted need for EN w/ plan for TPN today until EN access obtained. Will provide PN rec's and monitor.     Nutrition Related Findings:    A&Ox3, confused at times, dentition WNL, Abd/BS WDL, intermittent nausea, +1 edema, I/O's WNL Wound Type: None       Current Nutrition Intake & Therapies:    Average Meal Intake: 1-25%  Average Supplements Intake: 1-25%  ADULT DIET; Clear Liquid  ADULT ORAL NUTRITION SUPPLEMENT; Breakfast, AM Snack, Lunch, PM Snack, Dinner; Clear Liquid Oral Supplement    Anthropometric Measures:  Height: 5' (152.4 cm)  Ideal Body Weight (IBW): 100 lbs (45 kg)    Admission Body Weight: 115 lb (52.2 kg) (8/19 bed scale)  Current Body Weight: 123 lb (55.8 kg) (bed 8/25; likely elevated currently 2/2 fluids), 114 % IBW. Weight Source: Bed Scale  Current BMI (kg/m2): 24  Usual Body Weight:  (# actual per EMR x past month)     Weight Adjustment For: No Adjustment                 BMI Categories: Normal Weight (BMI 22.0 to 24.9) age over 72    Estimated Daily Nutrient Needs:  Energy Requirements Based On: Formula  Weight Used for Energy Requirements: Admission  Energy (kcal/day):   Weight Used for Protein Requirements: Admission  Protein (g/day): 1.3-1.5gm/kg Adm Wt= 70-80, as tolerated w/ CKD hx  Method Used for Fluid Requirements: 1 ml/kcal  Fluid (ml/day):     Nutrition Diagnosis:   Severe malnutrition, In context of chronic illness related to catabolic illness as evidenced by poor intake prior to admission, severe loss of subcutaneous fat, severe muscle loss    Nutrition Interventions:   Food and/or Nutrient Delivery: Continue Current Diet, Modify Oral Nutrition Supplement, Start Parenteral Nutrition (Continue Diet as tolerated. Will Modify Current ONS to TID. Recommend TPN until EN access possible.   PN Recommendation:  3-in-1 Central Standard @ 60ml/hr (Goal) Continuous x 24hr/d= 1440ml TV, 1490kcal, 72gm AA.)  Nutrition Education/Counseling: No recommendation at this time  Coordination of Nutrition Care: Continue to monitor while inpatient       Goals:  Previous Goal Met: Goal(s) Achieved  Goals: PO intake 50% or greater, Initiate nutrition support, Tolerate nutrition support at goal rate  Specify Other Goals: nutrition progression    Nutrition Monitoring and Evaluation:   Behavioral-Environmental Outcomes: None Identified  Food/Nutrient Intake Outcomes: Diet Advancement/Tolerance, Food and Nutrient Intake, Supplement Intake, Enteral Nutrition Intake/Tolerance, Parenteral Nutrition Intake/Tolerance  Physical Signs/Symptoms Outcomes: Biochemical Data, Chewing or Swallowing, GI Status, Nausea or Vomiting, Fluid Status or Edema, Nutrition Focused Physical Findings, Skin, Weight    Discharge Planning:     Too soon to determine     Deborra ANGELA Moon, LD  Contact: ext [phone]

## 2022-08-25 NOTE — PROGRESS NOTES
Witnessed patient having seizure activity. Lasted approximately 40 seconds. Patient drowsy but alert and oriented. Seizure pads in place. RN notified.

## 2022-08-25 NOTE — CONSULTS
[facility]  Neurology Consult    Date:  8/25/2022  Patient Name:  Marvel Silva  YOB: 1955  MRN: [id number]     PCP:  Elysa Felty, MD   Referring:  No ref. provider found      Chief Complaint: recurrent seizures despite medications     History obtained from: chart     Assessment  74year old woman with a history of medically refractory focal epilepsy currently admitted with concerns of dysphagia and possible mass (? Concern for malignancy) with several breakthrough seizures yesterday. Her current medical issues may be contributing to the frequency, though she has never been seizure free with an average of 4-6 per week per [facility] last documentation. She does not appear to be on her home doses of ASM during this admission for unknown reasons and this is likely the cause of her cluster of seizures yesterday. Will resume prior ASM dosages as last documented by [facility] in 3/22 as I see no other documentation that they should have been changed. Currently she is on LEV 1500 mg qAM and 1000 mg qHS, Primidone 150 mg BID    ASM as documented by last [facility] note should be as follows     mg TID   LEV 1500 mg BID and 500 mg qHS   Primidone 100 mg qAM and 150 mg qHS     Hx of R hemorrhagic stroke with residual L hemiplegia     Plan  Resume prior ASM doses   Patient is waiting a bed at [facility]  Follow up with [facility] epilepsy  Neurology will be available if needed, please call     History of Present Illness:  Marvel Silva is a 74 y.o. female presenting for evaluation of dysphagia. She initially presented from NH with dysphagia and vomiting. Found to have possible mass at the GE junction and CT chest concerning for malignancy. Awaiting transfer to [facility]. She has a history of medically refractory epilepsy on 3 ASM and follows with [facility] epilepsy, last seen in 3/2022.  ASM documented at that time include LEV 1500 mg BID and 500 mg qHS,  mg TID and primidone 100 mg qAM and 150 mg nightly. Currently, for unknown reasons she is only receiving LEV 1500 mg qAM and 1000 mg nightly and Primidone 150 mg BID. She is not on her home dose of LCM. She has not been seizure free and has an average seizure frequency of 4-6 per week. She has a history of lung and kidney transplant.        ROS limited     Medical History:   Past Medical History:   Diagnosis Date    Acute on chronic respiratory failure with hypoxia ([address] 4/7/2017    Anemia     Cardiomegaly     CHF (congestive heart failure) (HCC)     Chronic kidney disease     Constipation     Diaphragmatic hernia     Diverticulitis     Epilepsy ([address]     Falls     GERD (gastroesophageal reflux disease)     Hemiparesis (HCC)     Hemiplegia (HCC)     Hemodialysis patient ([address]     MON - WED- FRI    History of blood transfusion     History of lung transplant ([address]     Hyperparathyroidism ([address]     Hypertension     Immunosuppressed status ([facility] 4/7/2017    Insomnia     IPF (idiopathic pulmonary fibrosis) ([facility])     Kidney stone     Neutropenia ([facility])     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 LAN    FIXATION KYPHOPLASTY  11/3/2015    kyphoplasty L5    FRACTURE SURGERY Left 12/2014    fracture    HERNIA REPAIR      HIP SURGERY      left    JOINT REPLACEMENT Bilateral     HIP    JOINT REPLACEMENT Left     knee    KNEE SURGERY      left    LEG SURGERY Left 12/06/2016    ORIF left femoral shaft and suprachondylr femur    LUNG TRANSPLANT  2003    [address]    OTHER SURGICAL HISTORY  11/14/2017    RUE fistula revision    [place] ANASTOMOSIS,AV,ANY SITE Right 9/28/2018    AV FISTULA  REVISION -- RIGHT ARM performed by Geeta Cardoza MD at [address] [place] [address] N/A 8/21/2022    EGD FOREIGN BODY REMOVAL performed by Jadon Adame MD at [address] [place] [address] N/A 8/21/2022    EGD BIOPSY performed by Jadon Adame MD at [address] [place] [address] History:   Family History   Family history unknown: Yes       Social History:  Social History     Tobacco Use    Smoking status: Never    Smokeless tobacco: Never   Substance Use Topics    Alcohol use: No    Drug use: No        Current Medications:      Current Facility-Administered Medications   Medication Dose Route Frequency Provider Last Rate Last Admin    dextrose 5 % and 0.45 % NaCl with KCl 40 mEq infusion   IntraVENous Continuous Sierra Nina, APRN -  mL/hr at 08/25/22 1124 New Bag at 08/25/22 1124    lidocaine PF 1 % injection 5 mL  5 mL IntraDERmal Once Sierra Nina, APRN - CNP        sodium chloride flush 0.9 % injection 5-40 mL  5-40 mL IntraVENous 2 times per day Sierra Nina, APRN - CNP   10 mL at 08/25/22 1043    sodium chloride flush 0.9 % injection 5-40 mL  5-40 mL IntraVENous PRN Sierra Nina, APRN - CNP        0.9 % sodium chloride infusion   IntraVENous PRN Sierra Nina, APRN - CNP        heparin flush 100 UNIT/ML injection 300 Units  3 mL IntraVENous 2 times per day Sierra Nian, APRN - CNP        heparin flush 100 UNIT/ML injection 300 Units  3 mL IntraCATHeter PRN Sierra Nina, APRN - CNP        PN-Adult  3-in-1 Central Line (Standard)   IntraVENous Continuous TPN Jordy Hutson MD        lacosamide (VIMPAT) 150 mg in sodium chloride 0.9 % 65 mL IVPB  150 mg IntraVENous q8h Shelbie Nails, PA        levETIRAcetam (KEPPRA) 1500 mg/100 mL IVPB  1,500 mg IntraVENous BID LESLEY Hollins        levETIRAcetam (KEPPRA) 500 mg/100 mL IVPB  500 mg IntraVENous Nightly LESLEY Hollins        LORazepam (ATIVAN) injection 1 mg  1 mg IntraVENous Q2H PRN Eliseo Mckeon MD        enoxaparin (LOVENOX) injection 40 mg  40 mg SubCUTAneous Daily Lauryn Perez MD   40 mg at 08/25/22 0845    pantoprazole (PROTONIX) 40 mg in sodium chloride (PF) 10 mL injection  40 mg IntraVENous Q12H Jose B Capal, DO   40 mg at 08/25/22 1037    tacrolimus (PROGRAF) capsule 3 mg  3 mg Oral BID Kandi Niece, DO   3 mg at 08/25/22 1215    tacrolimus (PROGRAF) capsule 2 mg  2 mg Oral Nightly Jose B Capal, DO   2 mg at 08/23/22 2111    potassium chloride (KLOR-CON M) extended release tablet 40 mEq  40 mEq Oral PRN Kandi Niece, DO        Or    potassium bicarb-citric acid (EFFER-K) effervescent tablet 40 mEq  40 mEq Oral PRN Kandi Niece, DO   40 mEq at 08/23/22 1008    Or    potassium chloride 10 mEq/100 mL IVPB (Peripheral Line)  10 mEq IntraVENous PRN Kandi Niece, DO        metoprolol (LOPRESSOR) injection 2.5 mg  2.5 mg IntraVENous Q8H Jose B Capal, DO   2.5 mg at 08/25/22 1039    primidone (MYSOLINE) tablet 150 mg  150 mg Oral BID Jose B Capal, DO   150 mg at 08/25/22 0844    amLODIPine (NORVASC) tablet 10 mg  10 mg Oral QAM Jose B Capal, DO   10 mg at 08/25/22 0844    0.9 % sodium chloride infusion   IntraVENous PRN Jose B Capal, DO        ondansetron (ZOFRAN-ODT) disintegrating tablet 4 mg  4 mg Oral Q8H PRN Kandi Niece, DO   4 mg at 08/24/22 9000    Or    ondansetron (ZOFRAN) injection 4 mg  4 mg IntraVENous Q6H PRN Kandi Niece, DO   4 mg at 08/20/22 2013    polyethylene glycol (GLYCOLAX) packet 17 g  17 g Oral Daily PRN Amina Santos B Capal, DO        acetaminophen (TYLENOL) tablet 650 mg  650 mg Oral Q6H PRN Jose B Capal, DO        Or    acetaminophen (TYLENOL) suppository 650 mg  650 mg Rectal Q6H PRN Kandi Niece, DO            Allergies:       Allergies   Allergen Reactions    Ambien [Zolpidem Tartrate] Other (See Comments)     confusion    Klonopin [Clonazepam] Other (See Comments)     seizures    Lorazepam Other (See Comments)     Hyperactivity, hallucinations, anxiety    Eszopiclone Anxiety        Physical Examination  Vitals   Vitals:    08/25/22 0242 08/25/22 0259 08/25/22 0749 08/25/22 1213   BP: (!) 151/96  137/83 (!) 146/83   Pulse: (!) 108  100 (!) 102   Resp:   18 16   Temp:   98.1 °F (36.7 °C) 98.2 °F (36.8 °C)   TempSrc:   Oral Temporal   SpO2: 96%  96% 99%   Weight:  123 lb 1 oz (55.8 kg)     Height:            General: Patient appears in no acute distress. Awake and oriented x 1  HEENT: Normocephalic, atraumatic  Chest: effort normal   Extremities/Peripheral vascular: No edema/swelling noted. No cold limbs noted. Neurologic Examination    Mental Status  Alert, and oriented to person. Speech is fluent with intact comprehension. evidence of memory impairment. Attention and concentration appeared fair     Cranial Nerves  II-XII grossly intact , mild dysarthria     Motor    L hemiparesis   Decreased overall bulk, increased tone in legs   No abnormal movements     Sensation  Light Touch: Intact distally in all four limbs    Coordination  No resting tremors observed     Gait  Deferred for safety/fall consideration      Labs  Recent Labs     08/23/22  0435 08/24/22  0822 08/25/22  0933    138  --    K 3.4* 3.2*  --    CL 98 96*  --    CO2 27 30*  --    BUN 9 11  --    CREATININE 0.5 0.5  --    GLUCOSE 87 107*  --    CALCIUM 8.4* 8.6  --    PROT 6.0*  --   --    LABALBU 2.9*  --   --    BILITOT 0.5  --   --    ALKPHOS 83  --   --    AST 11  --   --    ALT 8  --   --    WBC 8.8 7.6 7.6   RBC 2.71* 3.11* 2.81*   HGB 9.2* 10.2* 9.2*   HCT 28.6* 32.3* 29.4*   .5* 103.9* 104.6*   MCH 33.9 32.8 32.7   MCHC 32.2 31.6* 31.3*   RDW 13.4 13.4 13.4    302 245   MPV 9.8 9.4 10.3       Imaging  CT CHEST WO CONTRAST   Final Result   Esophagus is patulous with air-fluid level extending to the distal segment   where there is soft tissue thickening limited by lack of intravenous contrast   potential hiatal hernia versus soft tissue thickening or mass.   Findings are   limited due to lack of intravenous contrast however in the setting of celiac   adenopathy concerning for malignancy is present. Tissue sampling and/or   contrast-enhanced/PET-CT evaluation considered         XR ABDOMEN (KUB) (SINGLE AP VIEW)   Final Result   Nonspecific nonobstructive bowel gas pattern with enteral barium extending   through the rectosigmoid colon. FL ESOPHAGRAM   Final Result   1. Complete occlusion of the distal esophagus above the diaphragma. 2.  Differential diagnosis would include a severe form of achalasia versus   annular/constrictive occlusive malignant lesion. 3.  Further evaluation with endoscopy recommended. FL MODIFIED BARIUM SWALLOW W VIDEO   Final Result   There is positive penetration but not aspiration to thin consistencies of   barium contrast.      Please see separate speech pathology report for full discussion of findings   and recommendations. RECOMMENDATIONS:   Unavailable         XR CHEST PORTABLE   Final Result   Suspect retrocardiac infiltrates, significantly improved since prior study.              EEG report 2016 [facility]- bifrontal epileptogenicity R > L     I have personally reviewed the following images: MRI brain     Electronically signed by LESLEY Neville on 8/25/2022 at 1:30 PM

## 2022-08-25 NOTE — PROGRESS NOTES
[facility]                                Progress note    Subjective:    Awake in bed, visiting with family at bedside  Nauseous this morning    Multiple seizures witnessed yesterday after patient missed several doses of keppra d/t inability to swallow medications/emesis. Objective:    BP (!) 146/83   Pulse (!) 102   Temp 98.2 °F (36.8 °C) (Temporal)   Resp 16   Ht 5' (1.524 m)   Wt 123 lb 1 oz (55.8 kg)   SpO2 99%   BMI 24.03 kg/m²     In: 120 [P.O.:120]  Out: -   In: 120   Out: -     General appearance: NAD, conversant but very weak-appears excessively tilted  HEENT: AT/NC, MMM  Neck: FROM, supple  Lungs: Clear to auscultation  CV: RRR, no MRGs  Vasc: Radial pulses 2+  Abdomen: Soft, non-tender; no masses or HSM  Extremities: No peripheral edema or digital cyanosis  Skin: no rash, lesions or ulcers  Psych: Alert and oriented to person, place and time  Neuro: Alert and interactive     Recent Labs     08/23/22  0435 08/24/22  0822 08/25/22  0933   WBC 8.8 7.6 7.6   HGB 9.2* 10.2* 9.2*   HCT 28.6* 32.3* 29.4*    302 245         Recent Labs     08/23/22  0435 08/24/22  0822    138   K 3.4* 3.2*   CL 98 96*   CO2 27 30*   BUN 9 11   CREATININE 0.5 0.5   CALCIUM 8.4* 8.6         Assessment:    Principal Problem:    Dysphagia  Active Problems:    Severe protein-calorie malnutrition (HCC)    Esophageal obstruction    Seizure (HCC)  Resolved Problems:    * No resolved hospital problems.  *      Plan:  Patient is a 42-year-old female admitted to [place] [address] for  Dysphagia   -Monitor labs  -Consult SLP  -MBS > passed, continue regular diet  -General surgery following  -EGD - Dysphagia; large amount of retained barium, friable tissue vs mass at GE junction with near-total occlusion  -CLD today  -CT chest to evaluate mass - soft tissue thickening or mass concern for malignancy  -Case discussed with Dr. Lázaro Marrufo arrangements for transfer that has been made to [facility] for further management/need for esophageal mass resection along with parenteral nutrition needs  -Initiate on TPN  -Continue IVF  -PRN antiemetics    Persistent seizures in spite of IV home medications  Neurology consult for further titration  seizure protocol  As needed Ativan has been ordered which she has not received during seizure episodes either due to seizures lasting only a few seconds or remarkably witnessed    Hypokalemia -   -Monitor labs  -K+ 3.2 > awaiting labs      Hypomagnesemia - resolved  -Mg+ 1.4, replaced     History of lung and kidney transplant  -Continue antirejection meds       DVT Prophylaxis   PT/OT  Discharge planning-awaiting transfer to [facility]    Case discussed with  at bedside agreed    ESTRELLA Pulido CNP  4:02 PM  8/25/2022     Above note edited to reflect my thoughts     I personally saw, examined and provided care for the patient. Radiographs, labs and medication list were reviewed by me independently. The case was discussed in detail and plans for care were established. Review of ELIZABETH Pulido   , documentation was conducted and revisions were made as appropriate directly by me. I agree with the above documented exam, problem list, and plan of care.      Susan Vance MD  4:37 PM  8/25/2022

## 2022-08-25 NOTE — PROGRESS NOTES
Chg double lumen picc Placement 8/25/2022    Product number: [id number]   Lot Number: [id number]      Ultrasound: yes   Left Brachial vein:                Upper Arm Circumference: 20cm    Size: 37cm    Exposed Length: 4cm    Internal Length: 33cm   Cut: 18cm   Vein Measurement: 0.60cm    Miguel Hooper RN  8/25/2022  4:08 PM    brian

## 2022-08-26 LAB
ALBUMIN SERPL-MCNC: 2.8 G/DL (ref 3.5–5.2)
ALP BLD-CCNC: 70 U/L (ref 35–104)
ALT SERPL-CCNC: 5 U/L (ref 0–32)
ANION GAP SERPL CALCULATED.3IONS-SCNC: 8 MMOL/L (ref 7–16)
AST SERPL-CCNC: 10 U/L (ref 0–31)
BILIRUB SERPL-MCNC: 0.4 MG/DL (ref 0–1.2)
BUN BLDV-MCNC: 9 MG/DL (ref 6–23)
CALCIUM SERPL-MCNC: 8.2 MG/DL (ref 8.6–10.2)
CHLORIDE BLD-SCNC: 95 MMOL/L (ref 98–107)
CO2: 26 MMOL/L (ref 22–29)
CREAT SERPL-MCNC: 0.4 MG/DL (ref 0.5–1)
EKG ATRIAL RATE: 93 BPM
EKG P AXIS: 60 DEGREES
EKG P-R INTERVAL: 138 MS
EKG Q-T INTERVAL: 388 MS
EKG QRS DURATION: 88 MS
EKG QTC CALCULATION (BAZETT): 482 MS
EKG R AXIS: 80 DEGREES
EKG T AXIS: -112 DEGREES
EKG VENTRICULAR RATE: 93 BPM
GFR AFRICAN AMERICAN: >60
GFR NON-AFRICAN AMERICAN: >60 ML/MIN/1.73
GLUCOSE BLD-MCNC: 136 MG/DL (ref 74–99)
HCT VFR BLD CALC: 27.7 % (ref 34–48)
HEMOGLOBIN: 8.8 G/DL (ref 11.5–15.5)
MAGNESIUM: 1.4 MG/DL (ref 1.6–2.6)
MCH RBC QN AUTO: 32.7 PG (ref 26–35)
MCHC RBC AUTO-ENTMCNC: 31.8 % (ref 32–34.5)
MCV RBC AUTO: 103 FL (ref 80–99.9)
METER GLUCOSE: 118 MG/DL (ref 74–99)
METER GLUCOSE: 134 MG/DL (ref 74–99)
METER GLUCOSE: 139 MG/DL (ref 74–99)
METER GLUCOSE: 145 MG/DL (ref 74–99)
METER GLUCOSE: 155 MG/DL (ref 74–99)
PDW BLD-RTO: 13.7 FL (ref 11.5–15)
PHOSPHORUS: 1.6 MG/DL (ref 2.5–4.5)
PLATELET # BLD: 205 E9/L (ref 130–450)
PMV BLD AUTO: 10.4 FL (ref 7–12)
POTASSIUM SERPL-SCNC: 5.2 MMOL/L (ref 3.5–5)
RBC # BLD: 2.69 E12/L (ref 3.5–5.5)
SODIUM BLD-SCNC: 129 MMOL/L (ref 132–146)
TOTAL PROTEIN: 5.8 G/DL (ref 6.4–8.3)
TRIGL SERPL-MCNC: 86 MG/DL (ref 0–149)
WBC # BLD: 6.4 E9/L (ref 4.5–11.5)

## 2022-08-26 PROCEDURE — 85027 COMPLETE CBC AUTOMATED: CPT

## 2022-08-26 PROCEDURE — 6360000002 HC RX W HCPCS: Performed by: INTERNAL MEDICINE

## 2022-08-26 PROCEDURE — 2580000003 HC RX 258: Performed by: STUDENT IN AN ORGANIZED HEALTH CARE EDUCATION/TRAINING PROGRAM

## 2022-08-26 PROCEDURE — 80053 COMPREHEN METABOLIC PANEL: CPT

## 2022-08-26 PROCEDURE — 2580000003 HC RX 258: Performed by: PHYSICIAN ASSISTANT

## 2022-08-26 PROCEDURE — 2500000003 HC RX 250 WO HCPCS: Performed by: STUDENT IN AN ORGANIZED HEALTH CARE EDUCATION/TRAINING PROGRAM

## 2022-08-26 PROCEDURE — 2580000003 HC RX 258: Performed by: NURSE PRACTITIONER

## 2022-08-26 PROCEDURE — 97535 SELF CARE MNGMENT TRAINING: CPT

## 2022-08-26 PROCEDURE — 36415 COLL VENOUS BLD VENIPUNCTURE: CPT

## 2022-08-26 PROCEDURE — 82962 GLUCOSE BLOOD TEST: CPT

## 2022-08-26 PROCEDURE — C9113 INJ PANTOPRAZOLE SODIUM, VIA: HCPCS | Performed by: STUDENT IN AN ORGANIZED HEALTH CARE EDUCATION/TRAINING PROGRAM

## 2022-08-26 PROCEDURE — C9254 INJECTION, LACOSAMIDE: HCPCS | Performed by: PHYSICIAN ASSISTANT

## 2022-08-26 PROCEDURE — 99232 SBSQ HOSP IP/OBS MODERATE 35: CPT | Performed by: SURGERY

## 2022-08-26 PROCEDURE — 6360000002 HC RX W HCPCS: Performed by: STUDENT IN AN ORGANIZED HEALTH CARE EDUCATION/TRAINING PROGRAM

## 2022-08-26 PROCEDURE — A4216 STERILE WATER/SALINE, 10 ML: HCPCS | Performed by: STUDENT IN AN ORGANIZED HEALTH CARE EDUCATION/TRAINING PROGRAM

## 2022-08-26 PROCEDURE — 97530 THERAPEUTIC ACTIVITIES: CPT

## 2022-08-26 PROCEDURE — 84100 ASSAY OF PHOSPHORUS: CPT

## 2022-08-26 PROCEDURE — 6360000002 HC RX W HCPCS: Performed by: PHYSICIAN ASSISTANT

## 2022-08-26 PROCEDURE — 1200000000 HC SEMI PRIVATE

## 2022-08-26 PROCEDURE — 6360000002 HC RX W HCPCS: Performed by: NURSE PRACTITIONER

## 2022-08-26 PROCEDURE — 6370000000 HC RX 637 (ALT 250 FOR IP): Performed by: STUDENT IN AN ORGANIZED HEALTH CARE EDUCATION/TRAINING PROGRAM

## 2022-08-26 PROCEDURE — 84478 ASSAY OF TRIGLYCERIDES: CPT

## 2022-08-26 PROCEDURE — 83735 ASSAY OF MAGNESIUM: CPT

## 2022-08-26 RX ORDER — MAGNESIUM SULFATE IN WATER 40 MG/ML
4000 INJECTION, SOLUTION INTRAVENOUS ONCE
Status: DISCONTINUED | OUTPATIENT
Start: 2022-08-26 | End: 2022-08-26 | Stop reason: SDUPTHER

## 2022-08-26 RX ORDER — MAGNESIUM SULFATE IN WATER 40 MG/ML
4000 INJECTION, SOLUTION INTRAVENOUS ONCE
Status: COMPLETED | OUTPATIENT
Start: 2022-08-26 | End: 2022-08-26

## 2022-08-26 RX ADMIN — Medication 10 ML: at 09:25

## 2022-08-26 RX ADMIN — LEVETIRACETAM 1500 MG: 15 INJECTION INTRAVENOUS at 10:07

## 2022-08-26 RX ADMIN — Medication 10 ML: at 21:09

## 2022-08-26 RX ADMIN — LACOSAMIDE 150 MG: 10 INJECTION INTRAVENOUS at 09:23

## 2022-08-26 RX ADMIN — MAGNESIUM SULFATE IN WATER 4000 MG: 40 INJECTION, SOLUTION INTRAVENOUS at 14:47

## 2022-08-26 RX ADMIN — LEVETIRACETAM 500 MG: 5 INJECTION INTRAVENOUS at 21:15

## 2022-08-26 RX ADMIN — SODIUM CHLORIDE, PRESERVATIVE FREE 40 MG: 5 INJECTION INTRAVENOUS at 21:08

## 2022-08-26 RX ADMIN — TACROLIMUS 3 MG: 1 CAPSULE ORAL at 11:38

## 2022-08-26 RX ADMIN — METOPROLOL TARTRATE 2.5 MG: 1 INJECTION, SOLUTION INTRAVENOUS at 18:27

## 2022-08-26 RX ADMIN — AMLODIPINE BESYLATE 10 MG: 10 TABLET ORAL at 08:01

## 2022-08-26 RX ADMIN — PRIMIDONE 150 MG: 50 TABLET ORAL at 08:01

## 2022-08-26 RX ADMIN — LACOSAMIDE 150 MG: 10 INJECTION INTRAVENOUS at 00:51

## 2022-08-26 RX ADMIN — Medication 300 UNITS: at 09:24

## 2022-08-26 RX ADMIN — CALCIUM GLUCONATE: 98 INJECTION, SOLUTION INTRAVENOUS at 18:36

## 2022-08-26 RX ADMIN — SODIUM CHLORIDE, PRESERVATIVE FREE 40 MG: 5 INJECTION INTRAVENOUS at 10:00

## 2022-08-26 RX ADMIN — METOPROLOL TARTRATE 2.5 MG: 1 INJECTION, SOLUTION INTRAVENOUS at 03:09

## 2022-08-26 RX ADMIN — ENOXAPARIN SODIUM 40 MG: 100 INJECTION SUBCUTANEOUS at 08:01

## 2022-08-26 RX ADMIN — METOPROLOL TARTRATE 2.5 MG: 1 INJECTION, SOLUTION INTRAVENOUS at 10:00

## 2022-08-26 RX ADMIN — LACOSAMIDE 150 MG: 10 INJECTION INTRAVENOUS at 18:18

## 2022-08-26 RX ADMIN — SODIUM PHOSPHATE, MONOBASIC, MONOHYDRATE 30 MMOL: 276; 142 INJECTION, SOLUTION INTRAVENOUS at 10:25

## 2022-08-26 RX ADMIN — Medication 300 UNITS: at 21:08

## 2022-08-26 RX ADMIN — LEVETIRACETAM 1500 MG: 15 INJECTION INTRAVENOUS at 17:06

## 2022-08-26 ASSESSMENT — PAIN SCALES - GENERAL: PAINLEVEL_OUTOF10: 0

## 2022-08-26 NOTE — CARE COORDINATION
Social Work/Case Management Transition of Care Planning ([facility] [place] [phone]):   TPN was started yesterday. Patient was seen by neurology due to seizure activity. Meds adjusted. Continue to wait for a bed for transfer to [facility]. Discharge envelope with ambulance form is in the soft chart.   SANDRA Chapman  8/26/2022

## 2022-08-26 NOTE — PROGRESS NOTES
OT BEDSIDE TREATMENT NOTE   [address] [facility] [address]   [address] [place] [address]      Date:2022  Patient Name: Melvern Duane  MRN: [id number]  : 1955  Room: [address] [place] [address]     Per OT Eval:     Evaluating [address] [facility] [address], OTR/L #[id number]     Referring Provider: Brian Ryan DO  Specific Provider Orders/Date: OT eval and treat 22      Diagnosis: Dysphagia [R13.10]  Dysphagia, unspecified type [R13.10]   Pt admitted to hospital on 22 for dysphagia, vomiting     Surgery / Procedure: EGD FOREIGN BODY REMOVAL WITH BIOPSY AND EPINEPHRINE APPLICATION on       Pertinent Medical History:  has a past medical history of Acute on chronic respiratory failure with hypoxia ([address] Anemia, Cardiomegaly, CHF (congestive heart failure) ([address] Chronic kidney disease, Constipation, Diaphragmatic hernia, Diverticulitis, Epilepsy ([address] Falls, GERD (gastroesophageal reflux disease), Hemiparesis ([address] Hemiplegia ([address] Hemodialysis patient ([address] History of blood transfusion, History of lung transplant ([address] Hyperparathyroidism ([address] Hypertension, Immunosuppressed status ([address] Insomnia, IPF (idiopathic pulmonary fibrosis) ([address] Kidney stone, Neutropenia ([address] 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          Precautions:  Fall Risk, clear liquid diet, bed alarm     Assessment of current deficits    [x] Functional mobility         [x]ADLs           [x] Strength                  [x]Cognition    [x] Functional transfers       [x] IADLs         [x] Safety Awareness   [x]Endurance    [] Fine Coordination                      [x] Balance      [] Vision/perception   []Sensation      []Gross Motor Coordination          [] ROM           [] Delirium                   [] Motor Control      OT PLAN OF CARE   OT POC based on physician orders, patient diagnosis and results of clinical assessment     Frequency/Duration 1-3 days/wk for 2 weeks PRN   Specific OT Treatment Interventions to include:   * Instruction/training on adapted ADL techniques and AE recommendations to increase functional independence within precautions       * Training on energy conservation strategies, correct breathing pattern and techniques to improve independence/tolerance for self-care routine  * Functional transfer/mobility training/DME recommendations for increased independence, safety, and fall prevention  * Patient/Family education to increase follow through with safety techniques and functional independence  * Recommendation of environmental modifications for increased safety with functional transfers/mobility and ADLs  * Cognitive retraining/development of therapeutic activities to improve problem solving, judgement, memory, and attention for increased safety/participation in ADL/IADL tasks  * Therapeutic exercise to improve motor endurance, ROM, and functional strength for ADLs/functional transfers  * Therapeutic activities to facilitate/challenge dynamic balance, stand tolerance for increased safety and independence with ADLs  * Therapeutic activities to facilitate gross/fine motor skills for increased independence with ADLs     Recommended Adaptive Equipment: TBD      Home Living: Pt admitted from [facility]  Pt lives with spouse in 1 floor home. Ramped entry   Equipment owned: rollator     Prior Level of Function: independent/mod I with ADLs; ambulated w/ rollator  At [facility] assist for ADL's and ambulation was provided per spouse.      Pain Level: Pt c/o no pain this session      Cognition: A&O: 3/4; Follows 1 step directions  Delayed processing speed           Memory:  fair  (spouse assisted w/ providing PLOF)           Sequencing:  fair            Problem solving:  fair -           Judgement/safety:  fair -             Functional Assessment:  AM-PAC Daily Activity Raw Score: 14/24 Initial Eval Status  Date: 8/23/22 Treatment Status  Date: 8/26/22 STGs = LTGs  Time frame: 10-14 days   Feeding Stand by Assist  MAX A to bring scoop jello from cup/bowel, bring to mouth and take bite, to  hold cup bring to mouth and take drink, to steady hand during task to limit spills. Modified [place]    Grooming Minimal Assist   To comb hair seated in chair  Washed face and hands while seated EOB MAX A to wash face at bed level 2* to decreased strength  Modified [place]    UB Dressing Minimal Assist   Donned/doffed gown MAX A to osvaldo/doff hospital gown at bed level  Modified [place]    LB Dressing Dependent  Dependent   Osvaldo socks lying supine  Minimal Assist    Bathing Maximal Assist  Maximal Assist  To complete bathing tasks at bed level and seated EOB due to decreased strength       Minimal Assist    Toileting Maximal Assist   n/t suspected with overall debility pt MAX A to complete toileting tasks  Minimal Assist    Bed Mobility  Supine to sit: Maximal Assist   Sit to supine: NT  Supine to sit: Maximal Assist   Sit to supine: Maximal Assist  Supine to sit: Minimal Assist   Sit to supine: Minimal Assist    Functional Transfers Moderate Assist  NT   Unable to ambulate this date d/t overall weakness and nausea Supervision   Functional Mobility Moderate Assist w/ w/w  Steps from EOB>bedside chair NT    Supervision   Balance Sitting:     Static:  Min A    Dynamic:Min A  Standing: Mod A w/ w/w Sitting:     Static: MAX A      Dynamic: MAX A   Standing: NT     Activity Tolerance Fair-  C/o fatigue w/ minimal activity  POOR   Decreased activity tolerance d/t overall fatigue with light ADLs and weakness    RUE pain (wrist/elbow) - sensitive to touch and all movement  Noted bruising at loctions Fair+   Visual/  Perceptual Glasses: yes                              Comments: Upon arrival pt lying supine in bed.  Pt educated  through out treatment regarding proper technique & safety with bed mobility, functional transfers & mobility, walker safety & completion of ADL tasks with modified techniques to improve independence, safety, prevent falls and allow pt to return to prior level of function. OT will continue with POC with focus on increasing independence on ADLs. At end of session, pt lying supine in bed with all lines and tubes intact, call light within reach. Pt has made FAIR progress towards set goals.    Continue with current plan of care      Treatment Time In: 3836           Treatment Time Out: 1616              Treatment Charges: Mins Units   Ther Ex  39314     Manual Therapy [id number]     Thera Activities [id number] 13 1   ADL/Home Mgt [id number] 15 1   Neuro Re-ed [id number]     Group Therapy      Orthotic manage/training  [id number]     Non-Billable Time     Total Timed Treatment 28 Annei Turner 83 [id number]

## 2022-08-26 NOTE — PROGRESS NOTES
[facility]                                Progress note    Subjective:    Awake in bed, visiting with family at bedside  States she's feeling slightly better. Still awaiting transfer to F    Objective:    BP (!) 151/66   Pulse 95   Temp 98.2 °F (36.8 °C) (Oral)   Resp 18   Ht 5' (1.524 m)   Wt 111 lb 9 oz (50.6 kg)   SpO2 98%   BMI 21.79 kg/m²     In: 716.2 [P.O.:240; I.V.:476.2]  Out: -   In: 716.2   Out: -     General appearance: NAD, conversant but very weak  HEENT: AT/NC, MMM  Neck: FROM, supple  Lungs: Clear to auscultation  CV: RRR, no MRGs  Vasc: Radial pulses 2+  Abdomen: Soft, non-tender; no masses or HSM  Extremities: No peripheral edema or digital cyanosis  Skin: no rash, lesions or ulcers  Psych: Alert and oriented to person, place and time  Neuro: Alert and interactive     Recent Labs     08/24/22  0822 08/25/22  0933 08/26/22  0613   WBC 7.6 7.6 6.4   HGB 10.2* 9.2* 8.8*   HCT 32.3* 29.4* 27.7*    245 205         Recent Labs     08/24/22  0822 08/26/22  0613    129*   K 3.2* 5.2*   CL 96* 95*   CO2 30* 26   BUN 11 9   CREATININE 0.5 0.4*   CALCIUM 8.6 8.2*         Assessment:    Principal Problem:    Dysphagia  Active Problems:    Severe protein-calorie malnutrition (HCC)    Esophageal obstruction    Seizure (HCC)  Resolved Problems:    * No resolved hospital problems.  *      Plan:  Patient is a 19-year-old female admitted to [facility] for  Dysphagia   -Monitor labs  -Consult SLP  -MBS > passed, continue regular diet  -General surgery following  -EGD - Dysphagia; large amount of retained barium, friable tissue vs mass at GE junction with near-total occlusion  -Await patho > poorly differentiated adenocarcinoma   -CLD today  -CT chest to evaluate mass - soft tissue thickening or mass concern for malignancy  -Appreciate arrangements for transfer that [facility] to [facility]/need for esophageal mass resection along with parenteral nutrition needs  -Initiated on TPN  -PRN antiemetics  -Electrolyte imbalances replaced, phos and mag, recheck morning      Persistent seizures in spite of IV home medications  -Neurology consult for further titration  seizure protocol  -As needed Ativan has been ordered which she has not received during seizure episodes either due to seizures lasting only a few seconds or remarkably witnessed    Hypokalemia -   -Monitor labs  -K+ 3.2 > 5.2 today. Hypomagnesemia - resolved  -Mg+ 1.4, replaced     History of lung and kidney transplant  -Continue antirejection meds       DVT Prophylaxis   PT/OT  Discharge planning-awaiting transfer to [facility]    Case discussed with  at bedside agreed    ETSRELLA Murillo CNP  2:11 PM  8/26/2022     Above note edited to reflect my thoughts   Now that we have a final pathology report, hopefully her transfer will be expedited    I personally saw, examined and provided care for the patient. Radiographs, labs and medication list were reviewed by me independently. The case was discussed in detail and plans for care were established. Review of ELIZABETH Murillo   , documentation was conducted and revisions were made as appropriate directly by me. I agree with the above documented exam, problem list, and plan of care.      Giovana Elder MD  6:11 PM  8/26/2022

## 2022-08-27 LAB
ANION GAP SERPL CALCULATED.3IONS-SCNC: 6 MMOL/L (ref 7–16)
ANION GAP SERPL CALCULATED.3IONS-SCNC: 8 MMOL/L (ref 7–16)
ANION GAP SERPL CALCULATED.3IONS-SCNC: 9 MMOL/L (ref 7–16)
ANION GAP SERPL CALCULATED.3IONS-SCNC: 9 MMOL/L (ref 7–16)
BUN BLDV-MCNC: 8 MG/DL (ref 6–23)
BUN BLDV-MCNC: 9 MG/DL (ref 6–23)
CALCIUM SERPL-MCNC: 8 MG/DL (ref 8.6–10.2)
CALCIUM SERPL-MCNC: 8 MG/DL (ref 8.6–10.2)
CALCIUM SERPL-MCNC: 8.1 MG/DL (ref 8.6–10.2)
CALCIUM SERPL-MCNC: 8.3 MG/DL (ref 8.6–10.2)
CHLORIDE BLD-SCNC: 96 MMOL/L (ref 98–107)
CHLORIDE BLD-SCNC: 97 MMOL/L (ref 98–107)
CO2: 24 MMOL/L (ref 22–29)
CO2: 26 MMOL/L (ref 22–29)
CREAT SERPL-MCNC: 0.5 MG/DL (ref 0.5–1)
GFR AFRICAN AMERICAN: >60
GFR NON-AFRICAN AMERICAN: >60 ML/MIN/1.73
GLUCOSE BLD-MCNC: 112 MG/DL (ref 74–99)
GLUCOSE BLD-MCNC: 128 MG/DL (ref 74–99)
GLUCOSE BLD-MCNC: 409 MG/DL (ref 74–99)
GLUCOSE BLD-MCNC: 538 MG/DL (ref 74–99)
MAGNESIUM: 2.1 MG/DL (ref 1.6–2.6)
MAGNESIUM: 2.3 MG/DL (ref 1.6–2.6)
MAGNESIUM: 2.8 MG/DL (ref 1.6–2.6)
MAGNESIUM: 2.9 MG/DL (ref 1.6–2.6)
METER GLUCOSE: 122 MG/DL (ref 74–99)
METER GLUCOSE: 127 MG/DL (ref 74–99)
PHOSPHORUS: 3.1 MG/DL (ref 2.5–4.5)
PHOSPHORUS: 3.2 MG/DL (ref 2.5–4.5)
PHOSPHORUS: 4.3 MG/DL (ref 2.5–4.5)
POTASSIUM SERPL-SCNC: 5.5 MMOL/L (ref 3.5–5)
POTASSIUM SERPL-SCNC: 5.7 MMOL/L (ref 3.5–5)
POTASSIUM SERPL-SCNC: 6.8 MMOL/L (ref 3.5–5)
POTASSIUM SERPL-SCNC: 7.9 MMOL/L (ref 3.5–5)
SODIUM BLD-SCNC: 129 MMOL/L (ref 132–146)
SODIUM BLD-SCNC: 129 MMOL/L (ref 132–146)
SODIUM BLD-SCNC: 130 MMOL/L (ref 132–146)
SODIUM BLD-SCNC: 131 MMOL/L (ref 132–146)

## 2022-08-27 PROCEDURE — 2500000003 HC RX 250 WO HCPCS: Performed by: STUDENT IN AN ORGANIZED HEALTH CARE EDUCATION/TRAINING PROGRAM

## 2022-08-27 PROCEDURE — 2580000003 HC RX 258: Performed by: STUDENT IN AN ORGANIZED HEALTH CARE EDUCATION/TRAINING PROGRAM

## 2022-08-27 PROCEDURE — 36592 COLLECT BLOOD FROM PICC: CPT

## 2022-08-27 PROCEDURE — 6360000002 HC RX W HCPCS: Performed by: STUDENT IN AN ORGANIZED HEALTH CARE EDUCATION/TRAINING PROGRAM

## 2022-08-27 PROCEDURE — 99232 SBSQ HOSP IP/OBS MODERATE 35: CPT | Performed by: SURGERY

## 2022-08-27 PROCEDURE — 84100 ASSAY OF PHOSPHORUS: CPT

## 2022-08-27 PROCEDURE — 2580000003 HC RX 258: Performed by: PHYSICIAN ASSISTANT

## 2022-08-27 PROCEDURE — A4216 STERILE WATER/SALINE, 10 ML: HCPCS | Performed by: STUDENT IN AN ORGANIZED HEALTH CARE EDUCATION/TRAINING PROGRAM

## 2022-08-27 PROCEDURE — C9113 INJ PANTOPRAZOLE SODIUM, VIA: HCPCS | Performed by: STUDENT IN AN ORGANIZED HEALTH CARE EDUCATION/TRAINING PROGRAM

## 2022-08-27 PROCEDURE — 83735 ASSAY OF MAGNESIUM: CPT

## 2022-08-27 PROCEDURE — 6360000002 HC RX W HCPCS: Performed by: NURSE PRACTITIONER

## 2022-08-27 PROCEDURE — 82962 GLUCOSE BLOOD TEST: CPT

## 2022-08-27 PROCEDURE — 2580000003 HC RX 258: Performed by: NURSE PRACTITIONER

## 2022-08-27 PROCEDURE — 6360000002 HC RX W HCPCS: Performed by: PHYSICIAN ASSISTANT

## 2022-08-27 PROCEDURE — 36415 COLL VENOUS BLD VENIPUNCTURE: CPT

## 2022-08-27 PROCEDURE — 6360000002 HC RX W HCPCS: Performed by: INTERNAL MEDICINE

## 2022-08-27 PROCEDURE — 1200000000 HC SEMI PRIVATE

## 2022-08-27 PROCEDURE — 80048 BASIC METABOLIC PNL TOTAL CA: CPT

## 2022-08-27 PROCEDURE — C9254 INJECTION, LACOSAMIDE: HCPCS | Performed by: PHYSICIAN ASSISTANT

## 2022-08-27 RX ADMIN — ENOXAPARIN SODIUM 40 MG: 100 INJECTION SUBCUTANEOUS at 09:26

## 2022-08-27 RX ADMIN — LEVETIRACETAM 1500 MG: 15 INJECTION INTRAVENOUS at 16:36

## 2022-08-27 RX ADMIN — LEVETIRACETAM 1500 MG: 15 INJECTION INTRAVENOUS at 09:21

## 2022-08-27 RX ADMIN — METOPROLOL TARTRATE 2.5 MG: 1 INJECTION, SOLUTION INTRAVENOUS at 17:58

## 2022-08-27 RX ADMIN — METOPROLOL TARTRATE 2.5 MG: 1 INJECTION, SOLUTION INTRAVENOUS at 10:26

## 2022-08-27 RX ADMIN — LACOSAMIDE 150 MG: 10 INJECTION INTRAVENOUS at 10:30

## 2022-08-27 RX ADMIN — SODIUM CHLORIDE, PRESERVATIVE FREE 40 MG: 5 INJECTION INTRAVENOUS at 10:24

## 2022-08-27 RX ADMIN — POTASSIUM CHLORIDE: 2 INJECTION, SOLUTION, CONCENTRATE INTRAVENOUS at 18:03

## 2022-08-27 RX ADMIN — LACOSAMIDE 150 MG: 10 INJECTION INTRAVENOUS at 00:31

## 2022-08-27 RX ADMIN — Medication 300 UNITS: at 09:26

## 2022-08-27 RX ADMIN — SODIUM CHLORIDE, PRESERVATIVE FREE 40 MG: 5 INJECTION INTRAVENOUS at 20:55

## 2022-08-27 RX ADMIN — LEVETIRACETAM 500 MG: 5 INJECTION INTRAVENOUS at 20:56

## 2022-08-27 RX ADMIN — TACROLIMUS 3 MG: 1 CAPSULE ORAL at 12:54

## 2022-08-27 RX ADMIN — Medication 10 ML: at 09:27

## 2022-08-27 RX ADMIN — LACOSAMIDE 150 MG: 10 INJECTION INTRAVENOUS at 18:19

## 2022-08-27 RX ADMIN — METOPROLOL TARTRATE 2.5 MG: 1 INJECTION, SOLUTION INTRAVENOUS at 02:35

## 2022-08-27 ASSESSMENT — PAIN SCALES - GENERAL: PAINLEVEL_OUTOF10: 0

## 2022-08-27 NOTE — PROGRESS NOTES
[facility]                                Progress note    Subjective:    Patient is alert and oriented on exam lying in bed  States she's feeling slightly better. Still awaiting transfer to F  No acute events overnight    Objective:    /74   Pulse 98   Temp 98.4 °F (36.9 °C) (Oral)   Resp 18   Ht 5' (1.524 m)   Wt 127 lb 8 oz (57.8 kg)   SpO2 97%   BMI 24.90 kg/m²     In: 941.6 [P.O.:120; I.V.:821.6]  Out: 500   In: 941.6   Out: 500 [Urine:500]    General appearance: NAD, conversant but very weak  HEENT: AT/NC, MMM  Neck: FROM, supple  Lungs: Rhonchorus  CV: RRR, no MRGs  Vasc: Radial pulses 2+  Abdomen: Soft, non-tender; no masses or HSM  Extremities: No peripheral edema or digital cyanosis  Skin: no rash, lesions or ulcers  Psych: Alert and oriented to person, place and time  Neuro: Alert and interactive     Recent Labs     08/25/22  0933 08/26/22  0613   WBC 7.6 6.4   HGB 9.2* 8.8*   HCT 29.4* 27.7*    205       Recent Labs     08/27/22  0158 08/27/22  0323 08/27/22  0452   * 129* 130*   K 6.8* 7.9* 5.5*   CL 96* 96* 96*   CO2 26 24 26   BUN 8 8 8   CREATININE 0.5 0.5 0.5   CALCIUM 8.1* 8.3* 8.0*       Assessment:    Principal Problem:    Dysphagia  Active Problems:    Severe protein-calorie malnutrition (HCC)    Esophageal obstruction    Seizure (HCC)  Resolved Problems:    * No resolved hospital problems.  *      Plan:  Patient is a 44-year-old female admitted to [facility] for  Dysphagia   -Monitor labs  -Consult SLP  -MBS > passed, continue regular diet  -General surgery following  -EGD - Dysphagia; large amount of retained barium, friable tissue vs mass at GE junction with near-total occlusion  -Await patho > poorly differentiated adenocarcinoma   -CLD today - continue  -CT chest to evaluate mass - soft tissue thickening or mass concern for malignancy  -Appreciate arrangements for transfer that has been made to [facility] for further management/need for esophageal mass resection along with parenteral nutrition needs  -Initiated on TPN-monitor BMP, discontinue potassium if this is contained in TPN  -PRN antiemetics  -Electrolyte imbalances replaced, phos and mag, recheck morning      Persistent seizures in spite of IV home medications  -Neurology consult for further titration  seizure protocol  -As needed Ativan has been ordered which she has not received during seizure episodes either due to seizures lasting only a few seconds or remarkably witnessed    Hyperkalemia   -Monitor labs  -K+ 3.2 > 5.5 today.  -Discontinue potassium     Hypomagnesemia - resolved  -Mg+ 1.4, replaced     History of lung and kidney transplant  -Continue antirejection meds       DVT Prophylaxis   PT/OT  Discharge planning-awaiting transfer to [facility] - hopefully bed soon      ESTRELLA Flynn CNP  4:01 PM  8/27/2022     Above note edited to reflect my thoughts     I personally saw, examined and provided care for the patient. Radiographs, labs and medication list were reviewed by me independently. The case was discussed in detail and plans for care were established. Review of Reva DEE CNP, documentation was conducted and revisions were made as appropriate directly by me. I agree with the above documented exam, problem list, and plan of care.      Hesham Andersen MD  7:21 PM  8/27/2022

## 2022-08-27 NOTE — PROGRESS NOTES
GENERAL SURGERY  DAILY PROGRESS NOTE  8/27/2022  Chief Complaint   Patient presents with    Dysphagia     Sent from NH for difficulty swallowing and malnutrition. Subjective:  Feeling a little better today. Path report shows ulcerated, poorly differentiated adenocarcinoma.  at bedside. Objective:  /74   Pulse 98   Temp 98.4 °F (36.9 °C) (Oral)   Resp 18   Ht 5' (1.524 m)   Wt 127 lb 8 oz (57.8 kg)   SpO2 97%   BMI 24.90 kg/m²     General Appearance:  awake, alert, conversant  Skin:  Skin color and texture pale, turgor poor  Head/face:  NCAT  Lungs/Chest:  Normal expansion. No respiratory distress. On room air  Heart: Warm throughout. Regular rate  Abdomen:  Soft, mild tenderness, non distended. Extremities: Extremities warm to touch, pink. RUE fistula with palpable thrill    Assessment/Plan:  74 y.o. female with dysphagia, friable tissue vs mass at GE junction, near total occlusion s/p EGD 8/21. CT chest concerning for malignancy. GE junction adenocarcinoma, severe protein-calorie malnutrition. Electrolyte imbalance. - Transfer to transplant center in progress.  Awaiting bed  - Continue CLD as tolerated  - TPN  -monitor/correct/replace electrolytes  - Recommend crushing any pills that patient needs to take      Electronically signed by Rashaun Love MD on 8/27/2022 at 12:55 PM

## 2022-08-27 NOTE — PLAN OF CARE
Problem: Safety - Adult  Goal: Free from fall injury  8/27/2022 1855 by Nisha García RN  Outcome: Progressing

## 2022-08-28 ENCOUNTER — APPOINTMENT (OUTPATIENT)
Dept: GENERAL RADIOLOGY | Age: 67
DRG: 374 | End: 2022-08-28
Payer: COMMERCIAL

## 2022-08-28 LAB
ANION GAP SERPL CALCULATED.3IONS-SCNC: 7 MMOL/L (ref 7–16)
BUN BLDV-MCNC: 10 MG/DL (ref 6–23)
BUN BLDV-MCNC: 9 MG/DL (ref 6–23)
BUN BLDV-MCNC: 9 MG/DL (ref 6–23)
CALCIUM SERPL-MCNC: 8.4 MG/DL (ref 8.6–10.2)
CALCIUM SERPL-MCNC: 8.4 MG/DL (ref 8.6–10.2)
CALCIUM SERPL-MCNC: 8.5 MG/DL (ref 8.6–10.2)
CHLORIDE BLD-SCNC: 94 MMOL/L (ref 98–107)
CHLORIDE BLD-SCNC: 95 MMOL/L (ref 98–107)
CHLORIDE BLD-SCNC: 95 MMOL/L (ref 98–107)
CO2: 24 MMOL/L (ref 22–29)
CO2: 25 MMOL/L (ref 22–29)
CO2: 26 MMOL/L (ref 22–29)
CREAT SERPL-MCNC: 0.5 MG/DL (ref 0.5–1)
GFR AFRICAN AMERICAN: >60
GFR NON-AFRICAN AMERICAN: >60 ML/MIN/1.73
GLUCOSE BLD-MCNC: 126 MG/DL (ref 74–99)
GLUCOSE BLD-MCNC: 131 MG/DL (ref 74–99)
GLUCOSE BLD-MCNC: 140 MG/DL (ref 74–99)
MAGNESIUM: 1.8 MG/DL (ref 1.6–2.6)
MAGNESIUM: 1.8 MG/DL (ref 1.6–2.6)
MAGNESIUM: 1.9 MG/DL (ref 1.6–2.6)
METER GLUCOSE: 123 MG/DL (ref 74–99)
METER GLUCOSE: 144 MG/DL (ref 74–99)
PHOSPHORUS: 3 MG/DL (ref 2.5–4.5)
PHOSPHORUS: 3.1 MG/DL (ref 2.5–4.5)
PHOSPHORUS: 3.3 MG/DL (ref 2.5–4.5)
POTASSIUM SERPL-SCNC: 5.1 MMOL/L (ref 3.5–5)
POTASSIUM SERPL-SCNC: 5.1 MMOL/L (ref 3.5–5)
POTASSIUM SERPL-SCNC: 5.3 MMOL/L (ref 3.5–5)
SODIUM BLD-SCNC: 126 MMOL/L (ref 132–146)
SODIUM BLD-SCNC: 126 MMOL/L (ref 132–146)
SODIUM BLD-SCNC: 128 MMOL/L (ref 132–146)

## 2022-08-28 PROCEDURE — A4216 STERILE WATER/SALINE, 10 ML: HCPCS | Performed by: STUDENT IN AN ORGANIZED HEALTH CARE EDUCATION/TRAINING PROGRAM

## 2022-08-28 PROCEDURE — 6370000000 HC RX 637 (ALT 250 FOR IP): Performed by: SURGERY

## 2022-08-28 PROCEDURE — 6360000002 HC RX W HCPCS: Performed by: PHYSICIAN ASSISTANT

## 2022-08-28 PROCEDURE — 82962 GLUCOSE BLOOD TEST: CPT

## 2022-08-28 PROCEDURE — C9254 INJECTION, LACOSAMIDE: HCPCS | Performed by: PHYSICIAN ASSISTANT

## 2022-08-28 PROCEDURE — 80048 BASIC METABOLIC PNL TOTAL CA: CPT

## 2022-08-28 PROCEDURE — 6360000002 HC RX W HCPCS: Performed by: NURSE PRACTITIONER

## 2022-08-28 PROCEDURE — 2580000003 HC RX 258: Performed by: NURSE PRACTITIONER

## 2022-08-28 PROCEDURE — 83735 ASSAY OF MAGNESIUM: CPT

## 2022-08-28 PROCEDURE — 84100 ASSAY OF PHOSPHORUS: CPT

## 2022-08-28 PROCEDURE — 71045 X-RAY EXAM CHEST 1 VIEW: CPT

## 2022-08-28 PROCEDURE — 6360000002 HC RX W HCPCS: Performed by: STUDENT IN AN ORGANIZED HEALTH CARE EDUCATION/TRAINING PROGRAM

## 2022-08-28 PROCEDURE — 99232 SBSQ HOSP IP/OBS MODERATE 35: CPT | Performed by: SURGERY

## 2022-08-28 PROCEDURE — 6360000002 HC RX W HCPCS: Performed by: INTERNAL MEDICINE

## 2022-08-28 PROCEDURE — 2500000003 HC RX 250 WO HCPCS: Performed by: STUDENT IN AN ORGANIZED HEALTH CARE EDUCATION/TRAINING PROGRAM

## 2022-08-28 PROCEDURE — 36415 COLL VENOUS BLD VENIPUNCTURE: CPT

## 2022-08-28 PROCEDURE — C9113 INJ PANTOPRAZOLE SODIUM, VIA: HCPCS | Performed by: STUDENT IN AN ORGANIZED HEALTH CARE EDUCATION/TRAINING PROGRAM

## 2022-08-28 PROCEDURE — 2580000003 HC RX 258: Performed by: STUDENT IN AN ORGANIZED HEALTH CARE EDUCATION/TRAINING PROGRAM

## 2022-08-28 PROCEDURE — 80197 ASSAY OF TACROLIMUS: CPT

## 2022-08-28 PROCEDURE — 2580000003 HC RX 258: Performed by: PHYSICIAN ASSISTANT

## 2022-08-28 PROCEDURE — 2500000003 HC RX 250 WO HCPCS: Performed by: SURGERY

## 2022-08-28 PROCEDURE — 1200000000 HC SEMI PRIVATE

## 2022-08-28 RX ORDER — IPRATROPIUM BROMIDE AND ALBUTEROL SULFATE 2.5; .5 MG/3ML; MG/3ML
1 SOLUTION RESPIRATORY (INHALATION) EVERY 6 HOURS PRN
Status: DISCONTINUED | OUTPATIENT
Start: 2022-08-28 | End: 2022-08-30 | Stop reason: HOSPADM

## 2022-08-28 RX ORDER — HYDRALAZINE HYDROCHLORIDE 20 MG/ML
1 INJECTION INTRAMUSCULAR; INTRAVENOUS EVERY 6 HOURS PRN
Status: DISCONTINUED | OUTPATIENT
Start: 2022-08-28 | End: 2022-08-30 | Stop reason: HOSPADM

## 2022-08-28 RX ADMIN — Medication 10 ML: at 21:54

## 2022-08-28 RX ADMIN — TACROLIMUS 3 MG: 1 CAPSULE ORAL at 05:04

## 2022-08-28 RX ADMIN — METOPROLOL TARTRATE 2.5 MG: 1 INJECTION, SOLUTION INTRAVENOUS at 18:26

## 2022-08-28 RX ADMIN — LACOSAMIDE 150 MG: 10 INJECTION INTRAVENOUS at 18:29

## 2022-08-28 RX ADMIN — SODIUM CHLORIDE, PRESERVATIVE FREE 40 MG: 5 INJECTION INTRAVENOUS at 21:48

## 2022-08-28 RX ADMIN — LACOSAMIDE 150 MG: 10 INJECTION INTRAVENOUS at 10:20

## 2022-08-28 RX ADMIN — LEVETIRACETAM 1500 MG: 15 INJECTION INTRAVENOUS at 17:20

## 2022-08-28 RX ADMIN — LACOSAMIDE 150 MG: 10 INJECTION INTRAVENOUS at 03:43

## 2022-08-28 RX ADMIN — Medication 300 UNITS: at 09:17

## 2022-08-28 RX ADMIN — LEVETIRACETAM 1500 MG: 15 INJECTION INTRAVENOUS at 09:18

## 2022-08-28 RX ADMIN — METOPROLOL TARTRATE 2.5 MG: 1 INJECTION, SOLUTION INTRAVENOUS at 02:56

## 2022-08-28 RX ADMIN — Medication 300 UNITS: at 21:48

## 2022-08-28 RX ADMIN — ENOXAPARIN SODIUM 40 MG: 100 INJECTION SUBCUTANEOUS at 09:16

## 2022-08-28 RX ADMIN — Medication 10 ML: at 09:17

## 2022-08-28 RX ADMIN — METOPROLOL TARTRATE 2.5 MG: 1 INJECTION, SOLUTION INTRAVENOUS at 10:20

## 2022-08-28 RX ADMIN — SODIUM CHLORIDE, PRESERVATIVE FREE 40 MG: 5 INJECTION INTRAVENOUS at 09:16

## 2022-08-28 RX ADMIN — Medication 3 MG: at 12:49

## 2022-08-28 RX ADMIN — SODIUM ACETATE: 164 INJECTION, SOLUTION, CONCENTRATE INTRAVENOUS at 19:55

## 2022-08-28 ASSESSMENT — PAIN SCALES - GENERAL
PAINLEVEL_OUTOF10: 0
PAINLEVEL_OUTOF10: 0

## 2022-08-28 ASSESSMENT — PAIN SCALES - WONG BAKER
WONGBAKER_NUMERICALRESPONSE: 0
WONGBAKER_NUMERICALRESPONSE: 0

## 2022-08-28 NOTE — PROGRESS NOTES
[facility]                                Progress note    Subjective:    Patient is alert and oriented on exam lying in bed looks better today  Still awaiting transfer to F  No acute events overnight    Objective:    BP (!) 146/77   Pulse 96   Temp 97.6 °F (36.4 °C)   Resp 15   Ht 5' (1.524 m)   Wt 122 lb 9.6 oz (55.6 kg)   SpO2 95%   BMI 23.94 kg/m²     In: 3725.8 [P.O.:255; I.V.:300]  Out: 300   In: 3725.8   Out: 300 [Urine:300]    General appearance: NAD, conversant but very weak  HEENT: AT/NC, MMM  Neck: FROM, supple  Lungs: Rhonchorus  CV: RRR, no MRGs  Vasc: Radial pulses 2+  Abdomen: Soft, non-tender; no masses or HSM  Extremities: No peripheral edema or digital cyanosis  Skin: no rash, lesions or ulcers  Psych: Alert and oriented to person, place and time  Neuro: Alert and interactive     Recent Labs     08/26/22  0613   WBC 6.4   HGB 8.8*   HCT 27.7*          Recent Labs     08/28/22  0115 08/28/22  0410 08/28/22  0926   * 126* 128*   K 5.3* 5.1* 5.1*   CL 95* 94* 95*   CO2 24 25 26   BUN 9 9 10   CREATININE 0.5 0.5 0.5   CALCIUM 8.5* 8.4* 8.4*       Assessment:    Principal Problem:    Dysphagia  Active Problems:    Severe protein-calorie malnutrition (HCC)    Esophageal obstruction    Seizure (HCC)  Resolved Problems:    * No resolved hospital problems.  *      Plan:  Patient is a 72-year-old female admitted to [place] [address] for  Dysphagia   -Monitor labs  -Consult SLP  -MBS > passed, continue regular diet  -General surgery following  -EGD - Dysphagia; large amount of retained barium, friable tissue vs mass at GE junction with near-total occlusion  -Await patho > poorly differentiated adenocarcinoma-discussed with Dr. Reuben Goldstein  -CT chest to evaluate mass - soft tissue thickening or mass concern for malignancy  -Appreciate arrangements for transfer that has been made to [facility] for further management/need for esophageal mass resection along with parenteral nutrition needs  -on TPN-monitor BMP, discontinue potassium if this is contained in TPN  -PRN antiemetics  -Electrolyte imbalances replaced, phos and mag, recheck morning      Persistent seizures in spite of IV home medications  -Neurology consult for further titration  seizure protocol  -As needed Ativan has been ordered which she has not received during seizure episodes either due to seizures lasting only a few seconds or unwitnessed    Hyperkalemia   -Monitor labs  -K+ 3.2 > 5.5 >5.1  -Discontinue potassium in TPN-defer to general surgery team     Hypomagnesemia - resolved  -Mg+ 1.4, replaced     History of lung and kidney transplant  -Continue antirejection meds       DVT Prophylaxis   PT/OT  Discharge planning-awaiting transfer to [facility] - hopefully bed soon      ESTRELLA Loja CNP  12:59 PM  8/28/2022     Above note edited to reflect my thoughts     I personally saw, examined and provided care for the patient. Radiographs, labs and medication list were reviewed by me independently. The case was discussed in detail and plans for care were established. Review of Reva DEE CNP, documentation was conducted and revisions were made as appropriate directly by me. I agree with the above documented exam, problem list, and plan of care.      Ruben Delatorre MD  4:13 PM  8/28/2022

## 2022-08-28 NOTE — PROGRESS NOTES
GENERAL SURGERY  DAILY PROGRESS NOTE  8/28/2022  Chief Complaint   Patient presents with    Dysphagia     Sent from NH for difficulty swallowing and malnutrition. Subjective:  Denies any new complaints overnight states that she is tolerating clear liquids however per nursing staff she has not tolerated clear liquids and has emesis with every attempt to swallow pills. Denies nausea      Objective:  /78   Pulse 91   Temp 97.7 °F (36.5 °C) (Oral)   Resp 16   Ht 5' (1.524 m)   Wt 122 lb 9.6 oz (55.6 kg)   SpO2 94%   BMI 23.94 kg/m²     General Appearance:  awake, alert, conversant  Skin:  Skin color and texture pale, turgor poor  Head/face:  NCAT  Lungs/Chest:  Normal expansion. No respiratory distress. On room air  Heart: Warm throughout. Regular rate  Abdomen:  Soft, nontender  Extremities: Extremities warm to touch, pink. RUE fistula with palpable thrill    Assessment/Plan:  74 y.o. female with dysphagia, friable tissue vs mass at GE junction, near total occlusion s/p EGD 8/21. CT chest concerning for malignancy. GE junction adenocarcinoma, severe protein-calorie malnutrition. Electrolyte imbalance. - Transfer to transplant center in progress. Awaiting bed-last updated last night and no bed available yet  - Continue CLD as tolerated  - TPN-potassium removed 8/28  - monitor/correct/replace electrolytes  - Recommend crushing any pills that patient needs to take  - Discussed with pharmacy about changing Prograf IV form versus oral suspension form however we do not have this in stock we are reaching out to [facility] as they have it listed that they do. - Discussed with the patient the importance of continue her antirejection medications.   Discussed with Dr. Shelbie Dominguez    Electronically signed by Killian Hurtado MD on 8/28/2022 at 6:17 AM       Attending Physician Statement:  I have examined the patient, reviewed the record, and discussed the case with the surgical team.  I agree with the assessment and plan with the following additions, corrections, and changes. I attempted to call [facility] yesterday to update the physicians about her pathology, recent seizure; however, I was on hold for 15 minutes with the [address] so could not reach them.     Electronically signed by Alexander Mcgowan MD on 8/28/22 at 11:51 AM EDT

## 2022-08-29 LAB
ANION GAP SERPL CALCULATED.3IONS-SCNC: 9 MMOL/L (ref 7–16)
BASOPHILS ABSOLUTE: 0.02 E9/L (ref 0–0.2)
BASOPHILS RELATIVE PERCENT: 0.2 % (ref 0–2)
BUN BLDV-MCNC: 12 MG/DL (ref 6–23)
CALCIUM SERPL-MCNC: 8.4 MG/DL (ref 8.6–10.2)
CHLORIDE BLD-SCNC: 96 MMOL/L (ref 98–107)
CO2: 25 MMOL/L (ref 22–29)
CREAT SERPL-MCNC: 0.5 MG/DL (ref 0.5–1)
EOSINOPHILS ABSOLUTE: 0.04 E9/L (ref 0.05–0.5)
EOSINOPHILS RELATIVE PERCENT: 0.3 % (ref 0–6)
GFR AFRICAN AMERICAN: >60
GFR NON-AFRICAN AMERICAN: >60 ML/MIN/1.73
GLUCOSE BLD-MCNC: 127 MG/DL (ref 74–99)
HCT VFR BLD CALC: 24.8 % (ref 34–48)
HEMOGLOBIN: 8.1 G/DL (ref 11.5–15.5)
IMMATURE GRANULOCYTES #: 0.11 E9/L
IMMATURE GRANULOCYTES %: 0.9 % (ref 0–5)
LYMPHOCYTES ABSOLUTE: 1.71 E9/L (ref 1.5–4)
LYMPHOCYTES RELATIVE PERCENT: 14.1 % (ref 20–42)
MAGNESIUM: 1.8 MG/DL (ref 1.6–2.6)
MCH RBC QN AUTO: 32.8 PG (ref 26–35)
MCHC RBC AUTO-ENTMCNC: 32.7 % (ref 32–34.5)
MCV RBC AUTO: 100.4 FL (ref 80–99.9)
METER GLUCOSE: 111 MG/DL (ref 74–99)
METER GLUCOSE: 134 MG/DL (ref 74–99)
METER GLUCOSE: 135 MG/DL (ref 74–99)
METER GLUCOSE: 135 MG/DL (ref 74–99)
MONOCYTES ABSOLUTE: 0.89 E9/L (ref 0.1–0.95)
MONOCYTES RELATIVE PERCENT: 7.3 % (ref 2–12)
NEUTROPHILS ABSOLUTE: 9.35 E9/L (ref 1.8–7.3)
NEUTROPHILS RELATIVE PERCENT: 77.2 % (ref 43–80)
PDW BLD-RTO: 14.1 FL (ref 11.5–15)
PHOSPHORUS: 3.7 MG/DL (ref 2.5–4.5)
PLATELET # BLD: 167 E9/L (ref 130–450)
PMV BLD AUTO: 10.3 FL (ref 7–12)
POTASSIUM SERPL-SCNC: 4.8 MMOL/L (ref 3.5–5)
RBC # BLD: 2.47 E12/L (ref 3.5–5.5)
SODIUM BLD-SCNC: 130 MMOL/L (ref 132–146)
WBC # BLD: 12.1 E9/L (ref 4.5–11.5)

## 2022-08-29 PROCEDURE — 82962 GLUCOSE BLOOD TEST: CPT

## 2022-08-29 PROCEDURE — 2500000003 HC RX 250 WO HCPCS: Performed by: STUDENT IN AN ORGANIZED HEALTH CARE EDUCATION/TRAINING PROGRAM

## 2022-08-29 PROCEDURE — C9113 INJ PANTOPRAZOLE SODIUM, VIA: HCPCS | Performed by: STUDENT IN AN ORGANIZED HEALTH CARE EDUCATION/TRAINING PROGRAM

## 2022-08-29 PROCEDURE — 80197 ASSAY OF TACROLIMUS: CPT

## 2022-08-29 PROCEDURE — 2700000000 HC OXYGEN THERAPY PER DAY

## 2022-08-29 PROCEDURE — 92610 EVALUATE SWALLOWING FUNCTION: CPT

## 2022-08-29 PROCEDURE — 6360000002 HC RX W HCPCS: Performed by: PHYSICIAN ASSISTANT

## 2022-08-29 PROCEDURE — 92526 ORAL FUNCTION THERAPY: CPT

## 2022-08-29 PROCEDURE — 2580000003 HC RX 258: Performed by: NURSE PRACTITIONER

## 2022-08-29 PROCEDURE — 6360000002 HC RX W HCPCS: Performed by: NURSE PRACTITIONER

## 2022-08-29 PROCEDURE — C9254 INJECTION, LACOSAMIDE: HCPCS | Performed by: PHYSICIAN ASSISTANT

## 2022-08-29 PROCEDURE — A4216 STERILE WATER/SALINE, 10 ML: HCPCS | Performed by: STUDENT IN AN ORGANIZED HEALTH CARE EDUCATION/TRAINING PROGRAM

## 2022-08-29 PROCEDURE — 6370000000 HC RX 637 (ALT 250 FOR IP): Performed by: SURGERY

## 2022-08-29 PROCEDURE — 87040 BLOOD CULTURE FOR BACTERIA: CPT

## 2022-08-29 PROCEDURE — 80048 BASIC METABOLIC PNL TOTAL CA: CPT

## 2022-08-29 PROCEDURE — 2580000003 HC RX 258: Performed by: STUDENT IN AN ORGANIZED HEALTH CARE EDUCATION/TRAINING PROGRAM

## 2022-08-29 PROCEDURE — 6360000002 HC RX W HCPCS: Performed by: INTERNAL MEDICINE

## 2022-08-29 PROCEDURE — 36415 COLL VENOUS BLD VENIPUNCTURE: CPT

## 2022-08-29 PROCEDURE — 83735 ASSAY OF MAGNESIUM: CPT

## 2022-08-29 PROCEDURE — 85025 COMPLETE CBC W/AUTO DIFF WBC: CPT

## 2022-08-29 PROCEDURE — 84100 ASSAY OF PHOSPHORUS: CPT

## 2022-08-29 PROCEDURE — 2580000003 HC RX 258: Performed by: PHYSICIAN ASSISTANT

## 2022-08-29 PROCEDURE — 6360000002 HC RX W HCPCS: Performed by: STUDENT IN AN ORGANIZED HEALTH CARE EDUCATION/TRAINING PROGRAM

## 2022-08-29 PROCEDURE — 1200000000 HC SEMI PRIVATE

## 2022-08-29 RX ADMIN — SODIUM CHLORIDE, PRESERVATIVE FREE 40 MG: 5 INJECTION INTRAVENOUS at 10:59

## 2022-08-29 RX ADMIN — Medication 2 MG: at 21:19

## 2022-08-29 RX ADMIN — SODIUM CHLORIDE, PRESERVATIVE FREE 40 MG: 5 INJECTION INTRAVENOUS at 21:19

## 2022-08-29 RX ADMIN — METOPROLOL TARTRATE 2.5 MG: 1 INJECTION, SOLUTION INTRAVENOUS at 10:10

## 2022-08-29 RX ADMIN — LEVETIRACETAM 1500 MG: 15 INJECTION INTRAVENOUS at 10:10

## 2022-08-29 RX ADMIN — ENOXAPARIN SODIUM 40 MG: 100 INJECTION SUBCUTANEOUS at 10:18

## 2022-08-29 RX ADMIN — Medication 300 UNITS: at 21:19

## 2022-08-29 RX ADMIN — LACOSAMIDE 150 MG: 10 INJECTION INTRAVENOUS at 11:03

## 2022-08-29 RX ADMIN — LACOSAMIDE 150 MG: 10 INJECTION INTRAVENOUS at 03:29

## 2022-08-29 RX ADMIN — SODIUM CHLORIDE 3000 MG: 900 INJECTION INTRAVENOUS at 12:27

## 2022-08-29 RX ADMIN — SODIUM CHLORIDE 3000 MG: 900 INJECTION INTRAVENOUS at 05:14

## 2022-08-29 RX ADMIN — SODIUM CHLORIDE 3000 MG: 900 INJECTION INTRAVENOUS at 02:55

## 2022-08-29 RX ADMIN — Medication 300 UNITS: at 10:18

## 2022-08-29 RX ADMIN — Medication 10 ML: at 10:19

## 2022-08-29 RX ADMIN — METOPROLOL TARTRATE 2.5 MG: 1 INJECTION, SOLUTION INTRAVENOUS at 18:32

## 2022-08-29 RX ADMIN — LEVETIRACETAM 1500 MG: 15 INJECTION INTRAVENOUS at 02:01

## 2022-08-29 RX ADMIN — SODIUM CHLORIDE 3000 MG: 900 INJECTION INTRAVENOUS at 17:28

## 2022-08-29 RX ADMIN — LEVETIRACETAM 1500 MG: 15 INJECTION INTRAVENOUS at 16:52

## 2022-08-29 RX ADMIN — LACOSAMIDE 150 MG: 10 INJECTION INTRAVENOUS at 18:51

## 2022-08-29 RX ADMIN — METOPROLOL TARTRATE 2.5 MG: 1 INJECTION, SOLUTION INTRAVENOUS at 02:24

## 2022-08-29 RX ADMIN — SODIUM CHLORIDE 3000 MG: 900 INJECTION INTRAVENOUS at 22:48

## 2022-08-29 RX ADMIN — CALCIUM GLUCONATE: 98 INJECTION, SOLUTION INTRAVENOUS at 18:36

## 2022-08-29 RX ADMIN — LEVETIRACETAM 500 MG: 5 INJECTION INTRAVENOUS at 21:22

## 2022-08-29 RX ADMIN — Medication 10 ML: at 21:20

## 2022-08-29 RX ADMIN — LEVETIRACETAM 500 MG: 5 INJECTION INTRAVENOUS at 02:20

## 2022-08-29 ASSESSMENT — PAIN SCALES - GENERAL
PAINLEVEL_OUTOF10: 0

## 2022-08-29 NOTE — PLAN OF CARE
Problem: Skin/Tissue Integrity  Goal: Absence of new skin breakdown  Description: 1. Monitor for areas of redness and/or skin breakdown  2. Assess vascular access sites hourly  3. Every 4-6 hours minimum:  Change oxygen saturation probe site  4. Every 4-6 hours:  If on nasal continuous positive airway pressure, respiratory therapy assess nares and determine need for appliance change or resting period.   8/29/2022 1143 by Oliver Adame RN  Outcome: Progressing     Problem: Safety - Adult  Goal: Free from fall injury  8/29/2022 1143 by Oliver Adame RN  Outcome: Progressing     Problem: ABCDS Injury Assessment  Goal: Absence of physical injury  8/29/2022 1143 by Oliver Adame RN  Outcome: Progressing  Flowsheets (Taken 8/29/2022 1141)  Absence of Physical Injury: Implement safety measures based on patient assessment     Problem: Chronic Conditions and Co-morbidities  Goal: Patient's chronic conditions and co-morbidity symptoms are monitored and maintained or improved  8/29/2022 1143 by Oliver Adame RN  Outcome: Progressing     Problem: Nutrition Deficit:  Goal: Optimize nutritional status  8/29/2022 1143 by Oliver Adame RN  Outcome: Progressing     Problem: Pain  Goal: Verbalizes/displays adequate comfort level or baseline comfort level  8/29/2022 1143 by Oliver Adame RN  Outcome: Progressing

## 2022-08-29 NOTE — CARE COORDINATION
Social Work/Case Management Transition of Care Planning ([place] [phone]):   Patient continues to wait for bed at [facility]. She remains on TPN. Discharge envelope with ambulance form is in the soft chart.   SANDRA Garza  8/29/2022

## 2022-08-29 NOTE — PROGRESS NOTES
SPEECH/LANGUAGE PATHOLOGY  CLINICAL ASSESSMENT OF SWALLOWING FUNCTION   and PLAN OF CARE  PATIENT NAME:  Ligia Ramirez  (female)     MRN:  [id number]    :  1955  (74 y.o.)  STATUS:  Inpatient: Room 8414/8414-A    TODAY'S DATE:  2022  REFERRING PROVIDER:   ESTRELLA Elizabeth CNP  SPECIFIC PROVIDER ORDER: SLP swallowing-dysphagia evaluation and treatment Date of order:  22  REASON FOR REFERRAL: dysphagia   EVALUATING THERAPIST: Cheri Schlatter, SLP                 ASSESSMENT:    DYSPHAGIA DIAGNOSIS:   Clinical indicators of functional swallow for age/premorbid functioning      DIET RECOMMENDATIONS:  Regular consistency solids (IDDSI level 7) with  thin liquids (IDDSI level 0)     FEEDING RECOMMENDATIONS:     Assistance level:  No assistance needed      Compensatory strategies recommended: No strategies are recommended at this time      Discussed recommendations with nursing?: No secondary to no diet/liquid change recommended     SPEECH THERAPY  PLAN OF CARE   The dysphagia POC is established based on physician order, dysphagia diagnosis and results of clinical assessment     Dysphagia therapy is not recommended     Conditions Requiring Skilled Therapeutic Intervention for dysphagia:    not applicable    Specific dysphagia interventions to include:     Not applicable    Specific instructions for next treatment:  not applicable   Patient Treatment Goals:    Short Term Goals:  Not applicable no therapy warranted     Long Term Goals:   Not applicable no therapy warranted      Patient/family Goal:    not applicable                    ADMITTING DIAGNOSIS: Dysphagia [R13.10]  Dysphagia, unspecified type [R13.10]    VISIT DIAGNOSIS:   Visit Diagnoses         Codes    Aspiration of foreign body, initial encounter     T17.908A             PATIENT REPORT/COMPLAINT: denies difficulty swallowing  nursing not available at time of evaluation chart reviewed and patient is not NPO for any procedures per current documentation. PRIOR LEVEL OF SWALLOW FUNCTION:    PAST HISTORY OF DYSPHAGIA?: none reported    Current Diet Order:  PN-Adult  3-in-1 Central Line (Standard)  Diet NPO  PN-Adult  3-in-1 Central Line (Standard)    PROCEDURE:  Consistencies Administered During the Evaluation   Liquids: thin liquid   Solids:  pureed foods and soft solid foods      Method of Intake:   cup, straw, spoon  Self fed      Position:   Seated, upright    CLINICAL ASSESSMENT  Oral Stage: The oral stage of swallowing was within functional limits      Pharyngeal Stage:    No signs of aspiration were noted during this evaluation however, silent aspiration cannot be ruled out at bedside. If silent aspiration is suspected, a Videofluoroscopic Study of Swallowing (MBS) is recommended and requires a physician order. Cognition:   Confusion noted    Oral Peripheral Examination   Adequate lingual/labial strength     Current Respiratory Status    2 liters nasal cannula     Parameters of Speech Production  Respiration:  Adequate for speech production  Quality:   Within functional limits  Intensity: Within functional limits    Volitional Swallow: Present    Volitional Cough:    Present    Pain: No pain reported. EDUCATION:   The Speech Language Pathologist (SLP) completed education regarding results of evaluation and that intervention is not warranted at this time. Learner: Patient  Education:  Reviewed results and recommendations of this evaluation  Evaluation of Education: Verbalizes understanding    This plan will be re-evaluated and revised in 1 week  if warranted. CPT code:  70080  bedside swallow eval      [x]The admitting diagnosis and active problem list, have been reviewed prior to initiation of this evaluation.         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[facility])    Acute on chronic kidney failure ([facility] [address]    Encounter regarding vascular access for dialysis for ESRD ([facility] [address]    Volume overload    Essential hypertension    Shortness of breath    Immunosuppressed status (HCC)    Acute on chronic respiratory failure with hypoxia (HCC)    Acute on chronic diastolic congestive heart failure ([facility])    Seizure (HCC)    Iron deficiency anemia    ESRD (end stage renal disease) on dialysis ([facility] [address]    AVF (arteriovenous fistula) ([facility])    Hospital-acquired pneumonia    Pneumonia    Anemia    Inferior pubic ramus fracture, left, closed, initial encounter ([facility])    Nausea & vomiting    Tear of medial collateral ligament of left knee    TRACI (acute kidney injury) ([facility] [address]    Severe protein-calorie malnutrition ([facility])    Folic acid deficiency    Dysphagia    Esophageal obstruction

## 2022-08-29 NOTE — PROGRESS NOTES
Unable to administer Tacrolimus (PROGRAF). Patient unable to tolerate oral medications. April, Nurse Practitioner made aware via telephone. No new orders at this time.

## 2022-08-29 NOTE — PROGRESS NOTES
Patient resting comfortably. No complaints of pain verbalized. Able to answer questions appropriately. Continues to read 98-99% on 2L of oxygen. Will continue to monitor. April, Nurse Practitioner, made aware via telephone. No new orders at this time.

## 2022-08-29 NOTE — PROGRESS NOTES
resection along with parenteral nutrition needs  TPN monitor labs daily  -PRN antiemetics      Persistent seizures in spite of IV home medications  -Neurology consult for further titration  seizure protocol  -As needed Ativan has been ordered which she has not received during seizure episodes either due to seizures lasting only a few seconds or unwitnessed    Hyperkalemia / resolved  -Monitor labs  -K+ 3.2 > 5.5 >5.1>4.8       Hypomagnesemia - resolved  -Mg+ 1.4, replaced     History of lung and kidney transplant  -Continue antirejection meds       DVT Prophylaxis   PT/OT  Discharge planning-awaiting transfer to [facility] - hopefully bed soon      [facility] , APRN - CNP  8:51 AM  8/29/2022     Above note edited to reflect my thoughts     I personally saw, examined and provided care for the patient. Radiographs, labs and medication list were reviewed by me independently. The case was discussed in detail and plans for care were established. Review of Reva DEE CNP, documentation was conducted and revisions were made as appropriate directly by me. I agree with the above documented exam, problem list, and plan of care.      Cliff Gale MD  5:25 PM  8/29/2022

## 2022-08-29 NOTE — PROGRESS NOTES
GENERAL SURGERY  DAILY PROGRESS NOTE  8/29/2022    CHIEF COMPLAINT:  Chief Complaint   Patient presents with    Dysphagia     Sent from NH for difficulty swallowing and malnutrition. SUBJECTIVE:  NAEO. Feeling ok this morning. Denies any nausea, vomiting or abdominal pain. Having bowel movements and passing gas. OBJECTIVE:  BP (!) 137/91   Pulse 100   Temp 97.5 °F (36.4 °C) (Oral)   Resp 19   Ht 5' (1.524 m)   Wt 123 lb 1.6 oz (55.8 kg)   SpO2 100%   BMI 24.04 kg/m²     CBC:   Lab Results   Component Value Date/Time    WBC 12.1 08/29/2022 05:20 AM    RBC 2.47 08/29/2022 05:20 AM    HGB 8.1 08/29/2022 05:20 AM    HCT 24.8 08/29/2022 05:20 AM    .4 08/29/2022 05:20 AM    MCH 32.8 08/29/2022 05:20 AM    MCHC 32.7 08/29/2022 05:20 AM    RDW 14.1 08/29/2022 05:20 AM     08/29/2022 05:20 AM    MPV 10.3 08/29/2022 05:20 AM     CMP:    Lab Results   Component Value Date/Time     08/29/2022 05:20 AM    K 4.8 08/29/2022 05:20 AM    K 3.4 08/20/2022 04:24 AM    CL 96 08/29/2022 05:20 AM    CO2 25 08/29/2022 05:20 AM    BUN 12 08/29/2022 05:20 AM    CREATININE 0.5 08/29/2022 05:20 AM    GFRAA >60 08/29/2022 05:20 AM    LABGLOM >60 08/29/2022 05:20 AM    GLUCOSE 127 08/29/2022 05:20 AM    GLUCOSE 85 03/25/2012 06:25 AM    PROT 5.8 08/26/2022 06:13 AM    LABALBU 2.8 08/26/2022 06:13 AM    CALCIUM 8.4 08/29/2022 05:20 AM    BILITOT 0.4 08/26/2022 06:13 AM    ALKPHOS 70 08/26/2022 06:13 AM    AST 10 08/26/2022 06:13 AM    ALT 5 08/26/2022 06:13 AM        GENERAL:  NAD. A&Ox3. LUNGS:  on 2L NC. No acute respiratory distress  CARDIOVASCULAR: hemodynamically stable  ABDOMEN:  Soft, non-distended, nontender. No guarding, rigidity, rebound. ASSESSMENT/PLAN:  74 y.o. female with dysphagia, friable tissue vs mass at GE junction, near total occlusion s/p EGD 8/21. CT chest concerning for malignancy. GE junction adenocarcinoma, severe protein-calorie malnutrition.  Electrolyte imbalance.     Plan  -transfer to [facility] in process  -TPN re-ordered  -continue to monitor and replace lytes as needed  -crush pills for administration  -continue anti-rejection medications     DW Dr. Ronal Doan MD  Surgery Resident PGY-2  8/29/2022  10:04 AM

## 2022-08-30 VITALS
DIASTOLIC BLOOD PRESSURE: 86 MMHG | TEMPERATURE: 99 F | HEART RATE: 93 BPM | WEIGHT: 127.6 LBS | RESPIRATION RATE: 16 BRPM | OXYGEN SATURATION: 96 % | BODY MASS INDEX: 25.05 KG/M2 | HEIGHT: 60 IN | SYSTOLIC BLOOD PRESSURE: 144 MMHG

## 2022-08-30 LAB
ANION GAP SERPL CALCULATED.3IONS-SCNC: 7 MMOL/L (ref 7–16)
BUN BLDV-MCNC: 14 MG/DL (ref 6–23)
CALCIUM SERPL-MCNC: 8.3 MG/DL (ref 8.6–10.2)
CHLORIDE BLD-SCNC: 96 MMOL/L (ref 98–107)
CO2: 28 MMOL/L (ref 22–29)
CREAT SERPL-MCNC: 0.5 MG/DL (ref 0.5–1)
GFR AFRICAN AMERICAN: >60
GFR NON-AFRICAN AMERICAN: >60 ML/MIN/1.73
GLUCOSE BLD-MCNC: 121 MG/DL (ref 74–99)
Lab: NORMAL
MAGNESIUM: 1.9 MG/DL (ref 1.6–2.6)
METER GLUCOSE: 125 MG/DL (ref 74–99)
METER GLUCOSE: 134 MG/DL (ref 74–99)
PHOSPHORUS: 3.6 MG/DL (ref 2.5–4.5)
POTASSIUM SERPL-SCNC: 4.3 MMOL/L (ref 3.5–5)
SODIUM BLD-SCNC: 131 MMOL/L (ref 132–146)
THIS TEST SENT TO: NORMAL

## 2022-08-30 PROCEDURE — 83735 ASSAY OF MAGNESIUM: CPT

## 2022-08-30 PROCEDURE — 2580000003 HC RX 258: Performed by: PHYSICIAN ASSISTANT

## 2022-08-30 PROCEDURE — 6360000002 HC RX W HCPCS: Performed by: PHYSICIAN ASSISTANT

## 2022-08-30 PROCEDURE — 80048 BASIC METABOLIC PNL TOTAL CA: CPT

## 2022-08-30 PROCEDURE — 84100 ASSAY OF PHOSPHORUS: CPT

## 2022-08-30 PROCEDURE — 2700000000 HC OXYGEN THERAPY PER DAY

## 2022-08-30 PROCEDURE — 36415 COLL VENOUS BLD VENIPUNCTURE: CPT

## 2022-08-30 PROCEDURE — 80197 ASSAY OF TACROLIMUS: CPT

## 2022-08-30 PROCEDURE — 2500000003 HC RX 250 WO HCPCS: Performed by: STUDENT IN AN ORGANIZED HEALTH CARE EDUCATION/TRAINING PROGRAM

## 2022-08-30 PROCEDURE — 82962 GLUCOSE BLOOD TEST: CPT

## 2022-08-30 PROCEDURE — C9254 INJECTION, LACOSAMIDE: HCPCS | Performed by: PHYSICIAN ASSISTANT

## 2022-08-30 RX ADMIN — LACOSAMIDE 150 MG: 10 INJECTION INTRAVENOUS at 02:14

## 2022-08-30 RX ADMIN — METOPROLOL TARTRATE 2.5 MG: 1 INJECTION, SOLUTION INTRAVENOUS at 02:14

## 2022-08-30 ASSESSMENT — PAIN SCALES - GENERAL
PAINLEVEL_OUTOF10: 0
PAINLEVEL_OUTOF10: 0

## 2022-08-30 NOTE — PROGRESS NOTES
Notified [facility] patients  on transfer to [facility] given unit and room number as well as phone # for nurses station.

## 2022-08-30 NOTE — PROGRESS NOTES
Nurse to nurse given to [facility] main unit j82 bed 7. Awaiting ETA for pickup for transfer.

## 2022-08-31 LAB
EKG ATRIAL RATE: 94 BPM
EKG P AXIS: 43 DEGREES
EKG P-R INTERVAL: 172 MS
EKG Q-T INTERVAL: 352 MS
EKG QRS DURATION: 84 MS
EKG QTC CALCULATION (BAZETT): 440 MS
EKG R AXIS: 55 DEGREES
EKG T AXIS: 4 DEGREES
EKG VENTRICULAR RATE: 94 BPM
TACROLIMUS BLOOD: 5.5 NG/ML
TACROLIMUS BLOOD: 6.5 NG/ML

## 2022-09-01 LAB — TACROLIMUS BLOOD: 8 NG/ML

## 2022-09-01 NOTE — DISCHARGE SUMMARY
Physician Discharge Summary     Patient ID:  Mone Huffman  [id number]  74 y.o.  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[address]    Acute on chronic kidney failure ([address]    Encounter regarding vascular access for dialysis for ESRD ([address]    Volume overload    Essential hypertension    Shortness of breath    Immunosuppressed status ([address]    Acute on chronic respiratory failure with hypoxia (HCC)    Acute on chronic diastolic congestive heart failure (HCC)    Generalized seizure disorder (HCC)    Iron deficiency anemia    ESRD (end stage renal disease) on dialysis (HCC)    AVF (arteriovenous fistula) (HCC)    Hospital-acquired pneumonia    Pneumonia    Anemia    Inferior pubic ramus fracture, left, closed, initial encounter (HCC)    Nausea & vomiting    Tear of medial collateral ligament of left knee    TRACI (acute kidney injury) ([address]    Moderate protein-calorie malnutrition (HCC)    Folic acid deficiency        Consults: ID    Procedures:        Hospital Course: The patient is a 74 y.o. female patient of Dr. Abiel Wolfe who presents with the above. 72-year-old female history of idiopathic pulmonary fibrosis CHFwith fatigue. The patient reportedly was seen recently for fatigue and the discharge she states symptoms have worsened here. EMS there is concern for altered mental status as well the patient is ANO x1 according to nursing staff. There is no chest pain or shortness of breath no belly pain and no other complaints at this time    RRT called about 1439 due to concern regarding patient with fever and swelling in upper extremities. Patient was seen and vitals were stable.   She was awake and responsive to stimuli and pain. T 99.7 - 137/60; HR 94.  RR regular. O2 sat 95%. Monitor with SR.  Lungs were clear to A. Cor normal S1 S2. No rales or rhonchi. UE swollen bilaterally. Winces with movement. Blood sugar was 115. Work up for low grade temp. CMP, CBC, BNP, uric acid, blood cultures, UA and Urine culture. Repeat CXR. Bilateral upper extremity ultrasound. Refractory wrist pain and swelling compatible with gout flare. ATBs per ID. To [facility] re future needs and rehab. Discharge Exam:  See progress note from today    Disposition: [facility]    Patient Instructions:   Discharge Medication List as of 8/17/2022 11:42 AM        START taking these medications    Details   !! levETIRAcetam (KEPPRA) 750 MG tablet Take 2 tablets by mouth daily, Disp-60 tablet, R-3DC to [facility]      !! levETIRAcetam (KEPPRA) 1000 MG tablet Take 1 tablet by mouth nightly, Disp-60 tablet, [id number] to SNF      folic acid (FOLVITE) 1 MG tablet Take 1 tablet by mouth daily, Disp-30 tablet, R-3DC to [facility]      colchicine (COLCRYS) 0.6 MG tablet Take 1 tablet by mouth daily for 1 dose, Disp-30 tablet, R-3DC to SNF       ! ! - Potential duplicate medications found. Please discuss with provider. CONTINUE these medications which have CHANGED    Details   carvedilol (COREG) 6.25 MG tablet Take 1 tablet by mouth 2 times daily (with meals), Disp-60 tablet, R-3DC to SNF      !! predniSONE (DELTASONE) 20 MG tablet Take 1 tablet by mouth 2 times daily (with meals) for 1 dose, Disp-1 tablet, R-0DC to SNF      lacosamide (VIMPAT) 150 MG TABS tablet Take 1 tablet by mouth every 8 hours for 30 days. , Disp-60 tabletDC to SNF      !! tacrolimus (PROGRAF) 1 MG capsule Take 3 capsules by mouth daily, Disp-60 capsule, R-3DC to SNF      !! predniSONE (DELTASONE) 10 MG tablet Take 1 tablet by mouth 2 times daily (with meals) for 6 doses, Disp-6 tablet, R-0DC to SNF      !! predniSONE (DELTASONE) 5 MG tablet Take 1 tablet by mouth daily for 10 days, Disp-10 tablet, R-0DC to SNF       ! ! - Potential duplicate medications found. Please discuss with provider. CONTINUE these medications which have NOT CHANGED    Details   famotidine (PEPCID) 20 MG tablet Take 1 tablet by mouth in the morning for 14 days. , Disp-14 tablet, R-0Normal      acetaminophen (TYLENOL) 325 MG tablet Take 650 mg by mouthHistorical Med      primidone (MYSOLINE) 50 MG tablet Take 3 tablets by mouth 2 times daily, Disp-180 tablet, R-11Normal      amLODIPine (NORVASC) 10 MG tablet Take 1 tablet by mouth daily, Disp-30 tablet, R-0DC to SNF      !! tacrolimus (PROGRAF) 1 MG capsule Take 3 mg by mouth daily 3 mg AM and Lunch, 2 mg NightlyHistorical Med       !! - Potential duplicate medications found. Please discuss with provider. STOP taking these medications       ondansetron (ZOFRAN) 4 MG tablet Comments:   Reason for Stopping:         levETIRAcetam (KEPPRA) 100 MG/ML solution Comments:   Reason for Stopping:             Activity: activity as tolerated  Diet: regular diet    Follow-up with Dr. Kristen Anthony in 5 days after [facility] [address]     Note that over 30 minutes was spent in preparing discharge papers, discussing discharge with patient, medication review, etc.    Signed:  Jolene Pro MD  8/31/2022  10:14 PM

## 2022-09-01 NOTE — DISCHARGE SUMMARY
[facility]   Discharge summary   Patient ID:  Collin Araiza  [id number]  74 y.o.  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 [facility])    Acute on chronic kidney failure ([facility]    Encounter regarding vascular access for dialysis for ESRD ([facility]    Volume overload    Essential hypertension    Shortness of breath    Immunosuppressed status ([facility]    Acute on chronic respiratory failure with hypoxia (HCC)    Acute on chronic diastolic congestive heart failure (HCC)    Seizure (HCC)    Iron deficiency anemia    ESRD (end stage renal disease) on dialysis ([facility]    AVF (arteriovenous fistula) (HCC)    Hospital-acquired pneumonia    Pneumonia    Anemia    Inferior pubic ramus fracture, left, closed, initial encounter ([facility])    Nausea & vomiting    Tear of medial collateral ligament of left knee    TRACI (acute kidney injury) ([facility]    Severe protein-calorie malnutrition (HCC)    Folic acid deficiency    Dysphagia    Esophageal obstruction       Discharge Diagnoses: Adenocarcinoma    Consults: neurology and general surgery    Procedures: Biopsy    Hospital Course:  The patient is a 74 y.o. female of Donald Hinton MD     Patient is a 51-year-old female admitted to [place] [address] for  Dysphagia   -Monitor labs  -Consult SLP  -MBS > passed, continue regular diet  -General surgery following  -EGD - Dysphagia; large amount of retained barium, friable tissue vs mass at GE junction with near-total occlusion  -Await patho > poorly differentiated adenocarcinoma  -CT chest to evaluate mass - soft tissue thickening or mass concern for malignancy  -Appreciate arrangements for transfer that has been made to [facility] for further management/need for esophageal mass resection along with parenteral nutrition needs  TPN monitor labs daily  -PRN antiemetics        Persistent seizures in spite of IV home medications  -Neurology consult for further titration  seizure protocol  -As needed Ativan has been ordered which she has not received during seizure episodes either due to seizures lasting only a few seconds or unwitnessed     Hyperkalemia / resolved  -Monitor labs  -K+ 3.2 > 5.5 >5.1>4.8        Hypomagnesemia - resolved  -Mg+ 1.4, replaced     History of lung and kidney transplant  -Continue antirejection meds       Patient transferred to [facility] in stable condition       No results for input(s): WBC, HGB, HCT, PLT in the last 72 hours. Recent Labs     08/30/22  0600   *   K 4.3   CL 96*   CO2 28   BUN 14   CREATININE 0.5   CALCIUM 8.3*       XR CHEST PORTABLE    Result Date: 8/19/2022  EXAMINATION: ONE XRAY VIEW OF THE CHEST 8/19/2022 3:22 pm COMPARISON: August 11, 2022 HISTORY: ORDERING SYSTEM PROVIDED HISTORY: dyspagia TECHNOLOGIST PROVIDED HISTORY: Reason for exam:->dyspagia What reading provider will be dictating this exam?->CRC FINDINGS: The patient is rotated. There is mild focal opacity in the retrocardiac region. The heart prominent in size. There is no pneumothorax. The costophrenic angles are clear. Surgical clips in the right hilar region. Suspect retrocardiac infiltrates, significantly improved since prior study. FL MODIFIED BARIUM SWALLOW W VIDEO    Result Date: 8/20/2022  EXAMINATION: MODIFIED BARIUM SWALLOW WAS PERFORMED IN CONJUNCTION WITH SPEECH PATHOLOGY SERVICES TECHNIQUE: Fluoroscopic evaluation of the swallowing mechanism was performed using cineradiography with multiple consistency of barium product in conjunction with speech pathology services. FLUOROSCOPY DOSE AND TYPE OR TIME AND EXPOSURES: Fluoro time 2 minutes Images: Set of 14 cine fluoro images.  D AP: 12 M Gy COMPARISON: None HISTORY: ORDERING SYSTEM PROVIDED HISTORY: failed swallow TECHNOLOGIST PROVIDED HISTORY: Reason for exam:->failed swallow What reading provider will be dictating this exam?->CRC FINDINGS: There is positive penetration but not aspiration to thin consistencies of barium contrast. No aspiration penetration was seen to pudding and cookie consistencies of barium contrast. There was no cough mechanism triggered during the examination. There was some retention in the vallecula but not in the piriform sinus. There is positive penetration but not aspiration to thin consistencies of barium contrast. Please see separate speech pathology report for full discussion of findings and recommendations. RECOMMENDATIONS: Unavailable       Discharge Exam:    HEENT: NCAT,  PERRLA, No JVD  Heart:  RRR, no murmurs, gallops, or rubs. Lungs:  CTA bilaterally, no wheeze, rales or rhonchi  Abd: bowel sounds present, nontender, nondistended, no masses  Extrem:  No clubbing, cyanosis, or edema    Disposition: [facility]     Patient Condition at Discharge: stable    Patient Instructions:      Medication List        ASK your doctor about these medications      acetaminophen 325 MG tablet  Commonly known as: TYLENOL     amLODIPine 10 MG tablet  Commonly known as: NORVASC  Take 1 tablet by mouth daily     carvedilol 6.25 MG tablet  Commonly known as: COREG  Take 1 tablet by mouth 2 times daily (with meals)     colchicine 0.6 MG tablet  Commonly known as: COLCRYS  Ask about: Which instructions should I use?     famotidine 20 MG tablet  Commonly known as: PEPCID  Ask about: Which instructions should I use?     folic acid 1 MG tablet  Commonly known as: FOLVITE  Take 1 tablet by mouth daily     lacosamide 150 MG Tabs tablet  Commonly known as: VIMPAT  Ask about: Which instructions should I use?      * levETIRAcetam 1000 MG tablet  Commonly known as: KEPPRA  Take 1 tablet by mouth nightly     * levETIRAcetam 750 MG tablet  Commonly known as: KEPPRA  Take 2 tablets by mouth daily     ondansetron 4 MG tablet  Commonly known as: Nasreen Jackson predniSONE 5 MG tablet  Commonly known as: Galen Silk  Ask about: Should I take this medication?     primidone 50 MG tablet  Commonly known as: MYSOLINE  Take 3 tablets by mouth 2 times daily     * tacrolimus 1 MG capsule  Commonly known as: PROGRAF  Ask about: Which instructions should I use? * tacrolimus 1 MG capsule  Commonly known as: PROGRAF  Ask about: Which instructions should I use?     vitamin C 500 MG tablet  Commonly known as: ASCORBIC ACID     vitamin D 25 MCG (1000 UT) Tabs tablet  Commonly known as: CHOLECALCIFEROL     zinc sulfate 220 (50 Zn) MG capsule  Commonly known as: ZINCATE           * This list has 4 medication(s) that are the same as other medications prescribed for you. Read the directions carefully, and ask your doctor or other care provider to review them with you. Activity: bedrest  Diet:  npo    Pt has been advised to: Follow-up with Crystal Metcalf MD in 1 week.   Follow-up with consultants as recommended by them    Note that over 30 minutes was spent in preparing discharge papers, discussing discharge with patient, medication review, etc.    Signed:  Hesham Andersen MD  8/30/2022

## 2022-09-03 LAB
BLOOD CULTURE, ROUTINE: NORMAL
CULTURE, BLOOD 2: NORMAL

## 2022-09-17 ASSESSMENT — ENCOUNTER SYMPTOMS
ABDOMINAL DISTENTION: 0
DIARRHEA: 0
CHEST TIGHTNESS: 0
PHOTOPHOBIA: 0
NAUSEA: 1
COUGH: 0
ABDOMINAL PAIN: 1
SHORTNESS OF BREATH: 0
VOMITING: 1

## 2022-09-17 NOTE — ED PROVIDER NOTES
Capillary refill takes less than 2 seconds. Coloration: Skin is not pale. Findings: No erythema or rash. Neurological:      Mental Status: She is alert and oriented to person, place, and time. Psychiatric:         Mood and Affect: Mood normal.        Procedures     MDM  Number of Diagnoses or Management Options  Acute gastritis without hemorrhage, unspecified gastritis type  Esophagitis  Diagnosis management comments: Liz Kenney presented to ED with epigastric abdominal pain, given medication with improvement of symptoms advised to follow up with cardiology and GI return to ED if symptoms worsen          EKG: This EKG is signed and interpreted by me. Rate: 72  Rhythm: Sinus  Interpretation: non-specific EKG, no St elevation or depression,   Comparison: changes compared to previous EKG           --------------------------------------------- PAST HISTORY ---------------------------------------------  Past Medical History:  has a past medical history of Acute on chronic respiratory failure with hypoxia (HCC), Anemia, Cardiomegaly, CHF (congestive heart failure) ([address] Chronic kidney disease, Constipation, Diaphragmatic hernia, Diverticulitis, Epilepsy ([address] Falls, GERD (gastroesophageal reflux disease), Hemiparesis ([address] Hemiplegia ([address] Hemodialysis patient ([address] History of blood transfusion, History of lung transplant ([address] Hyperparathyroidism ([address] Hypertension, Immunosuppressed status ([address] Insomnia, IPF (idiopathic pulmonary fibrosis) ([address] Kidney stone, Neutropenia ([address] 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 Past Surgical History:  has a past surgical history that includes Lung transplant (2003); hip surgery; knee surgery; hernia repair; Cholecystectomy; Fixation Kyphoplasty (11/3/2015); Leg Surgery (Left, 12/06/2016);  Dialysis fistula creation (Right, 04/27/2017); back surgery; AV fistula repair (Right, 08/17/2017); Colonoscopy; other surgical history (11/14/2017); fracture surgery (Left, 12/2014); joint replacement (Bilateral); joint replacement (Left); pr anastomosis,av,any site (Right, 9/28/2018); Upper gastrointestinal endoscopy (N/A, 8/21/2022); and Upper gastrointestinal endoscopy (N/A, 8/21/2022). Social History:  reports that she has never smoked. She has never used smokeless tobacco. She reports that she does not drink alcohol and does not use drugs. Family History: Family history is unknown by patient. The patients home medications have been reviewed.     Allergies: Ambien [zolpidem tartrate], Klonopin [clonazepam], Lorazepam, and Eszopiclone    -------------------------------------------------- RESULTS -------------------------------------------------  Labs:  Results for orders placed or performed during the hospital encounter of 07/20/22   COVID-19, Rapid    Specimen: Nasopharyngeal Swab   Result Value Ref Range    SARS-CoV-2, NAAT Not Detected Not Detected   RAPID INFLUENZA A/B ANTIGENS    Specimen: Nasopharyngeal   Result Value Ref Range    Influenza A by PCR Not Detected Not Detected    Influenza B by PCR Not Detected Not Detected   CBC with Auto Differential   Result Value Ref Range    WBC 5.7 4.5 - 11.5 E9/L    RBC 3.96 3.50 - 5.50 E12/L    Hemoglobin 13.6 11.5 - 15.5 g/dL    Hematocrit 41.5 34.0 - 48.0 %    .8 (H) 80.0 - 99.9 fL    MCH 34.3 26.0 - 35.0 pg    MCHC 32.8 32.0 - 34.5 %    RDW 12.3 11.5 - 15.0 fL    Platelets 980 705 - 363 E9/L    MPV 9.6 7.0 - 12.0 fL    Neutrophils % 49.5 43.0 - 80.0 %    Immature Granulocytes % 0.4 0.0 - 5.0 %    Lymphocytes % 36.2 20.0 - 42.0 %    Monocytes % 11.1 2.0 - 12.0 %    Eosinophils % 2.3 0.0 - 6.0 %    Basophils % 0.5 0.0 - 2.0 %    Neutrophils Absolute 2.82 1.80 - 7.30 E9/L    Immature Granulocytes # 0.02 E9/L    Lymphocytes Absolute 2.06 1.50 - 4.00 E9/L    Monocytes Absolute 0.63 462 ms    P Axis 39 degrees    R Axis 48 degrees    T Axis -57 degrees   EKG 12 Lead   Result Value Ref Range    Ventricular Rate 80 BPM    Atrial Rate 80 BPM    P-R Interval 150 ms    QRS Duration 78 ms    Q-T Interval 356 ms    QTc Calculation (Bazett) 410 ms    P Axis 51 degrees    R Axis 49 degrees    T Axis -79 degrees   TYPE AND SCREEN   Result Value Ref Range    ABO/Rh B POS     Antibody Screen NEG        Radiology:  CT ABDOMEN PELVIS WO CONTRAST Additional Contrast? None   Final Result   1. Questionable mural thickening along the gastroesophageal junction. Endoscopic evaluation recommended. Small hiatal hernia. 2. Mild enlargement of two left para-aortic lymph nodes, with the larger of   the two measuring 2.4 x 1.5 cm. They may be reactive or metastatic in   etiology. 3. Atrophic native kidneys with nephrolithiasis. No hydronephrosis. Unremarkable transplant kidney in the right iliac fossa. 4. Distal colonic diverticulosis without diverticulitis. XR CHEST PORTABLE   Final Result   1. Limited study due to rotation artifact. 2. Questionable left retrocardiac infiltrate. Repeat chest radiograph   recommended, PA and lateral if possible.             ------------------------- NURSING NOTES AND VITALS REVIEWED ---------------------------  Date / Time Roomed:  7/20/2022  8:59 PM  ED Bed Assignment:  07/07    The nursing notes within the ED encounter and vital signs as below have been reviewed. BP (!) 157/95   Pulse 78   Temp 97.7 °F (36.5 °C)   Resp 15   Ht 5' (1.524 m)   Wt 110 lb (49.9 kg)   SpO2 98%   BMI 21.48 kg/m²   Oxygen Saturation Interpretation: Normal      ------------------------------------------ PROGRESS NOTES ------------------------------------------  6:40 PM EDT  I have spoken with the patient and discussed todays results, in addition to providing specific details for the plan of care and counseling regarding the diagnosis and prognosis.   Their questions are answered at this time and they are agreeable with the plan. I discussed at length with them reasons for immediate return here for re evaluation. They will followup with their primary care physician by calling their office tomorrow. --------------------------------- ADDITIONAL PROVIDER NOTES ---------------------------------  At this time the patient is without objective evidence of an acute process requiring hospitalization or inpatient management. They have remained hemodynamically stable throughout their entire ED visit and are stable for discharge with outpatient follow-up. The plan has been discussed in detail and they are aware of the specific conditions for emergent return, as well as the importance of follow-up. Discharge Medication List as of 7/21/2022  2:50 AM        START taking these medications    Details   famotidine (PEPCID) 20 MG tablet Take 1 tablet by mouth in the morning for 14 days. , Disp-14 tablet, R-0Normal             Diagnosis:  1. Acute gastritis without hemorrhage, unspecified gastritis type    2. Esophagitis        Disposition:  Patient's disposition: Discharge to home  Patient's condition is stable.           Sonya Martell MD  09/17/22 [id number]

## 2023-06-08 NOTE — PROGRESS NOTES
P Quality Flow/Interdisciplinary Rounds Progress Note        Quality Flow Rounds held on August 12, 2022    Disciplines Attending:  Bedside Nurse, , , and Nursing Unit Leadership    Temo Donato was admitted on 8/3/2022  8:35 PM    Anticipated Discharge Date:       Disposition:    Suman Score:  Suman Scale Score: 15    Readmission Risk              Risk of Unplanned Readmission:  0           Discussed patient goal for the day, patient clinical progression, and barriers to discharge. The following Goal(s) of the Day/Commitment(s) have been identified:  ID consulted, check plan.       Elton Gregory RN  August 12, 2022 previous_biopsy_has_been_previously_biopsied Has The Growth Been Previously Biopsied?: has been previously biopsied

## 2024-10-19 NOTE — ED PROVIDER NOTES
[facility]  Department of Emergency Medicine   ED  Encounter Note  Admit Date/RoomTime: 2021  2:01 PM  ED Room: [place] [address]    NAME: Michelle Perikns  : 1955  MRN: [id number]     Chief Complaint:  Fall (rib pain and right hip)    History of Present Illness       Michelle Perkins is a 61 y.o. old female who presents to the emergency department for complaint of mechanical fall. She reports that she fell over her dogs tail. States that the incident occurred 8PM last night. Denies hitting her head, denies LOC. Denies use of blood thinners. Reports chronic use of Roller aider for ambulation. Complaints of low back pain, right hip pain and right posterior rib pain. Denies any loss of bowel or bladder control. Denies IVD use. Denies fever, chills, numbness, tingling, paresthesias, chest pain, shortness of breath, abdominal pain, dysuria, urinary frequency, hematuria, headache, visual changes, lightheadedness, dizziness, syncope, or other complaints at this time. Since onset the symptoms have been unchanged. Her pain is aggraveated by any movement or pressure on or palpation of painful area and relieved by nothing. She reports using Tylenol for her pain with no significant improvement. Reports that she takes no NSAIDs due to her kidney history. ROS   Pertinent positives and negatives are stated within HPI, all other systems reviewed and are negative.     Past Medical History:  has a past medical history of Acute on chronic respiratory failure with hypoxia ([address] Anemia, Cardiomegaly, CHF (congestive heart failure) ([address] Chronic kidney disease, Constipation, Diaphragmatic hernia, Diverticulitis, Epilepsy ([address] Falls, GERD (gastroesophageal reflux disease), Hemiparesis ([address] Hemiplegia ([address] Hemodialysis patient ([address] History of blood transfusion, History of lung transplant ([address] Hyperparathyroidism ([address] Hypertension, Immunosuppressed status ([address] Insomnia, IPF (idiopathic pulmonary fibrosis) ([address] Kidney stone, Neutropenia ([address] 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 Surgical History:  has a past surgical history that includes Lung transplant (2003); hip surgery; knee surgery; hernia repair; Cholecystectomy; Fixation Kyphoplasty (11/3/2015); Leg Surgery (Left, 12/06/2016); Dialysis fistula creation (Right, 04/27/2017); back surgery; AV fistula repair (Right, 08/17/2017); Colonoscopy; other surgical history (11/14/2017); fracture surgery (Left, 12/2014); joint replacement (Bilateral); joint replacement (Left); and pr anastomosis,av,any site (Right, 9/28/2018). Social History:  reports that she has never smoked. She has never used smokeless tobacco. She reports that she does not drink alcohol and does not use drugs. Family History: Family history is unknown by patient. Allergies: Ambien [zolpidem tartrate], Klonopin [clonazepam], Lorazepam, and Eszopiclone    Physical Exam   Oxygen Saturation Interpretation: Normal.        ED Triage Vitals [07/13/21 1330]   BP Temp Temp Source Pulse Resp SpO2 Height Weight   (!) 141/74 97.8 °F (36.6 °C) Temporal 88 14 99 % 5' 1\" (1.549 m) 103 lb (46.7 kg)         Physical Exam  Constitutional:  Alert, development consistent with age. HEENT:  NC/NT. Airway patent. Neck:  No midline or paravertebral tenderness. Normal ROM. Supple. Chest:  Symmetrical without visible rash or tenderness to anterior chest wall. She was noted to have mild tenderness to her the posterior mid right rib region. No crepitus. No skin changes or erythema. No bruising. Respiratory:  Lungs Clear to auscultation and breath sounds equal.  CV:  Regular rate and rhythm, normal heart sounds, without pathological murmurs, ectopy, gallops, or rubs. GI:  Abdomen Soft, nontender, good bowel sounds.   No firm or Re-examination:  7/13/21  Patients symptoms are improving. He was able to ambulate into the bathroom with assistance. Consult(s):   None    Procedure(s):  None    MDM:   Yisel Sosa is a 44-year-old female who presented to the emergency department for complaint of right rib pain and right hip pain after sustaining a mechanical fall yesterday around 8 PM.  She reported chronic use of roller aider for mobility. She reported that last night around 8 PM she tripped on her dog's tail. Denies striking her head. No blood thinner use. No LOC. On physical exam she was noted to have tenderness to her posterior mid rib region. No crepitus. No shortness of breath. No anterior chest wall pain. In addition to tenderness to her right upper hip. No midline thoracic or lumbar tenderness. No skin changes or swelling. Imaging of her right hip was reassuring for no acute bony pathology or prosthetic complications. She had history of right hip hemiarthroscopy. CT chest showed no intrathoracic wall injury. No rib fractures. Imaging of her lumbar spine showed compression deformity of T12, L1, and L2, of indeterminate age. On exam she had no midline tenderness. No signs or concerns for cauda equina. History of kyphoplasty to L5. She was able to ambulate with assistance. She reported wanting to follow-up with her previous surgeon who did her previous kyphoplasty. She was unsure of the surgeon's name at the time of her visit. She was given neurosurgical contact information as well and instructed to follow-up if needed. Plan is for pain control and appropriate follow-up. She remained hemodynamically stable, nontoxic, and appropriate for outpatient management. Advised to return for any new, change, worsening symptoms or concerns. At this time the patient is without objective evidence of an acute process requiring hospitalization or inpatient management.   They have remained hemodynamically stable throughout pulse oximetry

## (undated) DEVICE — SKIN AFFIX SURG ADHESIVE 72/CS 0.55ML: Brand: MEDLINE

## (undated) DEVICE — ADAPTER CATH WHT DISP FOR ANES

## (undated) DEVICE — GEL US 20GM NONIRRITATING OVERWRAPPED FILE PCH TRNSMIT

## (undated) DEVICE — PATIENT RETURN ELECTRODE, SINGLE-USE, CONTACT QUALITY MONITORING, ADULT, WITH 9FT CORD, FOR PATIENTS WEIGING OVER 33LBS. (15KG): Brand: MEGADYNE

## (undated) DEVICE — DRESSING BORDERED ADH GZ UNIV GEN USE 8INX4IN AND 6INX2IN

## (undated) DEVICE — LOOP VES W25MM THK1MM MAXI RED SIL FLD REPELLENT 100 PER

## (undated) DEVICE — SYRINGE MED 50ML LUERSLIP TIP

## (undated) DEVICE — LABEL MED 4 IN SURG PANEL W/ PEN STRL

## (undated) DEVICE — FEEDING TUBE • RADIOPAQUE: Brand: ARGYLE

## (undated) DEVICE — TOWEL SURG W16XL26IN WHT NONFENESTRATED ST 4 PER PK

## (undated) DEVICE — RETRIEVER ENDOSCP L230CM DIA2.5MM NET W3XL5.5CM MIN WRK CHN

## (undated) DEVICE — DOUBLE BASIN SET: Brand: MEDLINE INDUSTRIES, INC.

## (undated) DEVICE — DILATOR ART

## (undated) DEVICE — TUBE GAST LAV 36FR RADPQ STRP OPN TIP MULT EYELET SGL LUMN

## (undated) DEVICE — SPONGE GZ W4XL4IN RAYON POLY FILL CVR W/ NONWOVEN FAB

## (undated) DEVICE — CANNULA INJ L2.5IN BLNT TIP 3MM CLR BODY W/ 1 W VLV DLP

## (undated) DEVICE — GOWN,SIRUS,FABRNF,L,20/CS: Brand: MEDLINE

## (undated) DEVICE — SET CLAMP NEONATAL VASCUALR

## (undated) DEVICE — BITEBLOCK 54FR W/ DENT RIM BLOX

## (undated) DEVICE — 3M™ MEDIPORE™ + PAD 3564: Brand: 3M™ MEDIPORE™

## (undated) DEVICE — DEFENDO AIR WATER SUCTION AND BIOPSY VALVE KIT FOR  OLYMPUS: Brand: DEFENDO AIR/WATER/SUCTION AND BIOPSY VALVE

## (undated) DEVICE — DRAPE SURGICAL HAND PROX AURORA

## (undated) DEVICE — SET SURG INSTR MINI VASC

## (undated) DEVICE — Z DISCONTINUED NO SUB IDED TUBING ETCO2 AD L6.5FT NSL ORAL CVD PRNG NONFLARED TIP OVR

## (undated) DEVICE — SOLUTION IV IRRIG POUR BRL 0.9% SODIUM CHL 2F7124

## (undated) DEVICE — Device

## (undated) DEVICE — STETHOSCOPE FLX BELL SGL HD CHROM PLT

## (undated) DEVICE — STANDARD HYPODERMIC NEEDLE,ALUMINUM HUB: Brand: MONOJECT

## (undated) DEVICE — SURGICAL PROCEDURE PACK VASC MAJ CUST

## (undated) DEVICE — AGENT HEMSTAT W2XL4IN OXIDIZED REGENERATED CELOS ABSRB

## (undated) DEVICE — SOLUTION IV IRRIG WATER 1000ML POUR BRL 2F7114

## (undated) DEVICE — SPONGE GZ W4XL4IN COT RADPQ HIGHLY ABSRB

## (undated) DEVICE — SOLUTION IV 250ML 0.9% SOD CHL PH 5 INJ USP VIAFLX PLAS

## (undated) DEVICE — DRAIN SURG W3/4XL36IN FLAT

## (undated) DEVICE — 1.5L THIN WALL CAN: Brand: CRD

## (undated) DEVICE — GLOVE ORANGE PI 7 1/2   MSG9075

## (undated) DEVICE — CONTROL SYRINGE LUER-LOCK TIP: Brand: MONOJECT

## (undated) DEVICE — NEEDLE SPNL 22GA L5IN BLK HUB S STL W/ QNCKE PNT W/OUT

## (undated) DEVICE — TOWEL,OR,DSP,ST,BLUE,STD,6/PK,12PK/CS: Brand: MEDLINE

## (undated) DEVICE — CLIP INT SM TI EZ LD LIG SYS WECK HORZ

## (undated) DEVICE — PENCIL,CAUTERY,ROCKER,PTFE,15'CORD: Brand: MEDLINE INDUSTRIES, INC.

## (undated) DEVICE — STOCKINETTE,DOUBLE PLY,6X48,STERILE: Brand: MEDLINE

## (undated) DEVICE — SINGLE-USE BIOPSY FORCEPS: Brand: RADIAL JAW 4

## (undated) DEVICE — CONTAINER SPEC 60ML PH 7NEUTRAL BUFF FRMLN RDY TO USE